# Patient Record
Sex: FEMALE | Race: WHITE | Employment: UNEMPLOYED | URBAN - METROPOLITAN AREA
[De-identification: names, ages, dates, MRNs, and addresses within clinical notes are randomized per-mention and may not be internally consistent; named-entity substitution may affect disease eponyms.]

---

## 2017-02-14 ENCOUNTER — HOSPITAL ENCOUNTER (OUTPATIENT)
Dept: SURGERY | Age: 47
Discharge: HOME | End: 2017-02-14
Attending: INTERNAL MEDICINE | Admitting: INTERNAL MEDICINE

## 2017-02-14 VITALS
HEIGHT: 65 IN | SYSTOLIC BLOOD PRESSURE: 133 MMHG | HEART RATE: 78 BPM | WEIGHT: 220 LBS | TEMPERATURE: 97.8 F | OXYGEN SATURATION: 98 % | DIASTOLIC BLOOD PRESSURE: 76 MMHG | RESPIRATION RATE: 20 BRPM | BODY MASS INDEX: 36.65 KG/M2

## 2017-02-14 RX ORDER — AMITRIPTYLINE HYDROCHLORIDE 100 MG/1
100 TABLET, FILM COATED ORAL NIGHTLY PRN
COMMUNITY
End: 2022-03-25 | Stop reason: ALTCHOICE

## 2017-02-14 RX ORDER — PROMETHAZINE HYDROCHLORIDE 25 MG/ML
12.5 INJECTION, SOLUTION INTRAMUSCULAR; INTRAVENOUS ONCE
Status: COMPLETED | OUTPATIENT
Start: 2017-02-14 | End: 2017-02-14

## 2017-02-14 RX ORDER — CYCLOBENZAPRINE HCL 5 MG
5 TABLET ORAL 3 TIMES DAILY PRN
COMMUNITY
End: 2021-07-10

## 2017-02-14 RX ORDER — DIPHENHYDRAMINE HYDROCHLORIDE 50 MG/ML
25 INJECTION INTRAMUSCULAR; INTRAVENOUS ONCE
Status: COMPLETED | OUTPATIENT
Start: 2017-02-14 | End: 2017-02-14

## 2017-02-14 RX ORDER — DIPHENHYDRAMINE HCL 25 MG
TABLET ORAL
Status: DISPENSED
Start: 2017-02-14 | End: 2017-02-14

## 2017-02-14 RX ORDER — OXYCODONE HYDROCHLORIDE AND ACETAMINOPHEN 5; 325 MG/1; MG/1
1 TABLET ORAL EVERY 4 HOURS PRN
COMMUNITY
End: 2017-02-16 | Stop reason: ALTCHOICE

## 2017-02-14 RX ADMIN — DIPHENHYDRAMINE HYDROCHLORIDE 25 MG: 50 INJECTION INTRAMUSCULAR; INTRAVENOUS at 11:15

## 2017-02-14 RX ADMIN — PROMETHAZINE HYDROCHLORIDE 12.5 MG: 25 INJECTION, SOLUTION INTRAMUSCULAR; INTRAVENOUS at 11:15

## 2017-02-16 PROBLEM — A41.9 SEPSIS (HCC): Status: ACTIVE | Noted: 2017-02-16

## 2017-02-16 PROBLEM — K44.9 HIATAL HERNIA WITH GERD AND ESOPHAGITIS: Chronic | Status: ACTIVE | Noted: 2017-02-16

## 2017-02-16 PROBLEM — K21.00 HIATAL HERNIA WITH GERD AND ESOPHAGITIS: Chronic | Status: ACTIVE | Noted: 2017-02-16

## 2017-02-16 PROBLEM — N20.0 RECURRENT NEPHROLITHIASIS: Status: ACTIVE | Noted: 2017-02-16

## 2017-12-05 ENCOUNTER — HOSPITAL ENCOUNTER (OUTPATIENT)
Dept: ENDOSCOPY | Age: 47
Discharge: HOME | End: 2017-12-05
Attending: INTERNAL MEDICINE | Admitting: INTERNAL MEDICINE

## 2017-12-05 VITALS
BODY MASS INDEX: 38.32 KG/M2 | WEIGHT: 230 LBS | HEART RATE: 106 BPM | OXYGEN SATURATION: 100 % | DIASTOLIC BLOOD PRESSURE: 85 MMHG | HEIGHT: 65 IN | TEMPERATURE: 98.1 F | SYSTOLIC BLOOD PRESSURE: 123 MMHG | RESPIRATION RATE: 20 BRPM

## 2017-12-05 RX ORDER — ESTRADIOL 0.07 MG/D
1 FILM, EXTENDED RELEASE TRANSDERMAL
COMMUNITY
Start: 2017-08-04 | End: 2020-08-29

## 2017-12-05 RX ORDER — DIPHENHYDRAMINE HYDROCHLORIDE 50 MG/ML
INJECTION INTRAMUSCULAR; INTRAVENOUS
Status: DISCONTINUED
Start: 2017-12-05 | End: 2017-12-06 | Stop reason: HOSPADM

## 2017-12-05 RX ORDER — ONDANSETRON 2 MG/ML
INJECTION INTRAMUSCULAR; INTRAVENOUS
Status: DISCONTINUED
Start: 2017-12-05 | End: 2017-12-06 | Stop reason: HOSPADM

## 2017-12-05 RX ORDER — DIPHENHYDRAMINE HYDROCHLORIDE 50 MG/ML
INJECTION INTRAMUSCULAR; INTRAVENOUS
Status: DISPENSED
Start: 2017-12-05 | End: 2017-12-05

## 2017-12-05 ASSESSMENT — PAIN - FUNCTIONAL ASSESSMENT: PAIN_FUNCTIONAL_ASSESSMENT: 0-10

## 2017-12-05 NOTE — OP NOTE
[address], [address]                                 OPERATIVE REPORT    PATIENT NAME: James Waters                        :        1970  MED REC NO:   [id number]                          ROOM:  ACCOUNT NO:   [de-identified]                          ADMIT DATE: 2017  PROVIDER:     Wagner Langley MD    DATE OF PROCEDURE:  2017    INDICATIONS FOR PROCEDURE:  A 73-year-old woman who presented with  dysphagia. She is known to have gastroesophageal reflux disease with  Villela esophagus. PROCEDURE:  With the patient in the left lateral position and after 50 mg  of Demerol and 6 mg of Versed IV, the Olympus video endoscope was  introduced into the esophagus and advanced towards the GE junction. A  small hiatus hernia and Villela esophagus was seen from which biopsies were  obtained. Further biopsies were obtained from the esophagus to rule out  the eosinophilic esophagitis. Given the patient history of dysphagia,  trial of dilation was  performed using balloon size 18 mm under direct  endoscopic control. Stomach was carefully inspected _____. The duodenum  was normal.  Scope was then removed without complication. IMPRESSION:  1. Hiatus hernia. 2.  Villela esophagus. Thank you Dr. Raul Doherty.         Ligia Matos MD    D: 2017 11:23:56       T: 2017 14:25:44     [email or web address]  Job#: [id number]     Doc#: [id number]    CC:

## 2017-12-05 NOTE — PLAN OF CARE
as ordered  [x] Tolerating clear liquids  [x] D/C IV fluids   ACTIVITY [x] Assess level of function  [x] Specified by physician  [x]Activity as tolerated [x] Position on left side [x] Position on left side and reposition patient as physician ordered [x] Gradually elevate HOB to callejass position  [x] Position changes as patient tolerated  [x] Ambulate as pre-procedure   PATIENT / SO EDUCATION [x] Pre,Intra, Post-procedure  teaching appropriate to procedure  [x]Conscious Sedation Teaching  [x] Pain Management - instructed [x] Encourage questions  [x] Clarify any concerns [x]Assist and support patient  [x]Safety devices maintain to  prevent patient injury  [x] Observe standard precautions [x] Physician confers with the family/S.O. [x] Short visit from family in [place] area  [x] Review discharge instructions, take home medicine to family /S.O.; questions clarified  [x] Physician specific post-procedure orders  [x] S/S complications with proper F/U  [x] F/U office visits  [] Med info sheet/ copy of discharge  instruction given to pt./S.O.   OUTCOME PLANNING  DISCHARGE PLAN [x] Patient/S. O. will verbalize understanding of admission  procedure & expectations of outcome in realistic terms  [x] Patient verbalize the role of family/S. O. in plan of care  [x] Patient will have designated responsible person available for discharge.  [x] Patient will demonstrate an  understanding of the planned  procedure and its related procedures and conscious sedation [x] Patient will:  - receive services according to the standards of care  - receive standards for conscious sedation  -  remain free of injury [x] Patient will:   - have stable vital signs based on Salomon Score  -  be pain free or tolerable, have no signs of difficulty in breathing  - no abdominal distention, severe sore throat, chest pain, bleeding  -  return to pre-procedure level of conciousness   - tolerate fluid with no N/V  - ambulate as pre-procedure ADL  - verbalize understanding of the discharge instructions      NURSE SIGNATURE: Celeste Coma    ____________________________ Lane Carito    ________________________    Lane Carito    __________________________    _______________________________________                                                            [id number],,C9[birth year],M

## 2018-08-15 ENCOUNTER — HOSPITAL ENCOUNTER (EMERGENCY)
Age: 48
Discharge: HOME | End: 2018-08-15
Attending: EMERGENCY MEDICINE
Payer: COMMERCIAL

## 2018-08-15 ENCOUNTER — APPOINTMENT (OUTPATIENT)
Dept: GENERAL RADIOLOGY | Age: 48
End: 2018-08-15
Payer: COMMERCIAL

## 2018-08-15 VITALS
SYSTOLIC BLOOD PRESSURE: 115 MMHG | BODY MASS INDEX: 37.49 KG/M2 | OXYGEN SATURATION: 99 % | HEIGHT: 65 IN | HEART RATE: 88 BPM | RESPIRATION RATE: 16 BRPM | DIASTOLIC BLOOD PRESSURE: 85 MMHG | WEIGHT: 225 LBS | TEMPERATURE: 98.1 F

## 2018-08-15 DIAGNOSIS — R10.84 GENERALIZED ABDOMINAL PAIN: Primary | ICD-10-CM

## 2018-08-15 DIAGNOSIS — R19.7 DIARRHEA, UNSPECIFIED TYPE: ICD-10-CM

## 2018-08-15 LAB
ALBUMIN SERPL-MCNC: 4.3 G/DL (ref 3.4–5)
ALP BLD-CCNC: 76 U/L (ref 40–129)
ALT SERPL-CCNC: 51 U/L (ref 10–40)
AST SERPL-CCNC: 32 U/L (ref 15–37)
BASOPHILS ABSOLUTE: 0 K/UL (ref 0–0.2)
BASOPHILS RELATIVE PERCENT: 0.2 %
BILIRUB SERPL-MCNC: 0.4 MG/DL (ref 0–1)
BILIRUBIN DIRECT: <0.2 MG/DL (ref 0–0.3)
BILIRUBIN URINE: NEGATIVE MG/DL
BILIRUBIN, INDIRECT: ABNORMAL MG/DL (ref 0–1)
BLOOD, URINE: NEGATIVE
CALCIUM IONIZED: 1.19 MMOL/L (ref 1.12–1.32)
CLARITY: NORMAL
CO2: 26 MMOL/L (ref 21–32)
COLOR: NORMAL
EOSINOPHILS ABSOLUTE: 0.2 K/UL (ref 0–0.6)
EOSINOPHILS RELATIVE PERCENT: 2.4 %
GFR AFRICAN AMERICAN: >60
GFR NON-AFRICAN AMERICAN: >60
GLUCOSE BLD-MCNC: 111 MG/DL (ref 70–99)
GLUCOSE URINE: NEGATIVE MG/DL
HCT VFR BLD CALC: 40.4 % (ref 36–48)
HEMOGLOBIN: 13.5 G/DL (ref 12–16)
KETONES, URINE: NEGATIVE MG/DL
LEUKOCYTE ESTERASE, URINE: NEGATIVE
LIPASE: 42 U/L (ref 13–60)
LYMPHOCYTES ABSOLUTE: 1.1 K/UL (ref 1–5.1)
LYMPHOCYTES RELATIVE PERCENT: 13.6 %
MCH RBC QN AUTO: 27.7 PG (ref 26–34)
MCHC RBC AUTO-ENTMCNC: 33.3 G/DL (ref 31–36)
MCV RBC AUTO: 83.1 FL (ref 80–100)
MICROSCOPIC EXAMINATION: NORMAL
MONOCYTES ABSOLUTE: 0.5 K/UL (ref 0–1.3)
MONOCYTES RELATIVE PERCENT: 5.9 %
NEUTROPHILS ABSOLUTE: 6.5 K/UL (ref 1.7–7.7)
NEUTROPHILS RELATIVE PERCENT: 77.9 %
NITRITE, URINE: NEGATIVE
PDW BLD-RTO: 14.7 % (ref 12.4–15.4)
PERFORMED ON: ABNORMAL
PERFORMED ON: NORMAL
PH UA: 6
PLATELET # BLD: 320 K/UL (ref 135–450)
PMV BLD AUTO: 7.8 FL (ref 5–10.5)
POC ANION GAP: 10 (ref 10–20)
POC BUN: 12 MG/DL (ref 7–18)
POC CHLORIDE: 107 MMOL/L (ref 99–110)
POC CREATININE: 0.9 MG/DL (ref 0.6–1.1)
POC POTASSIUM: 4.6 MMOL/L (ref 3.5–5.1)
POC SAMPLE TYPE: ABNORMAL
POC SAMPLE TYPE: NORMAL
POC SODIUM: 143 MMOL/L (ref 136–145)
POC TROPONIN I: 0 NG/ML (ref 0–0.1)
PROTEIN UA: NEGATIVE MG/DL
RBC # BLD: 4.87 M/UL (ref 4–5.2)
SPECIFIC GRAVITY UA: >=1.03
TOTAL PROTEIN: 7.1 G/DL (ref 6.4–8.2)
UROBILINOGEN, URINE: 0.2 E.U./DL
WBC # BLD: 8.3 K/UL (ref 4–11)

## 2018-08-15 PROCEDURE — 96366 THER/PROPH/DIAG IV INF ADDON: CPT

## 2018-08-15 PROCEDURE — 81003 URINALYSIS AUTO W/O SCOPE: CPT

## 2018-08-15 PROCEDURE — 2580000003 HC RX 258: Performed by: EMERGENCY MEDICINE

## 2018-08-15 PROCEDURE — 83690 ASSAY OF LIPASE: CPT

## 2018-08-15 PROCEDURE — 85025 COMPLETE CBC W/AUTO DIFF WBC: CPT

## 2018-08-15 PROCEDURE — 96365 THER/PROPH/DIAG IV INF INIT: CPT

## 2018-08-15 PROCEDURE — 80076 HEPATIC FUNCTION PANEL: CPT

## 2018-08-15 PROCEDURE — 6360000002 HC RX W HCPCS: Performed by: EMERGENCY MEDICINE

## 2018-08-15 PROCEDURE — 71046 X-RAY EXAM CHEST 2 VIEWS: CPT

## 2018-08-15 PROCEDURE — 80047 BASIC METABLC PNL IONIZED CA: CPT

## 2018-08-15 PROCEDURE — 96375 TX/PRO/DX INJ NEW DRUG ADDON: CPT

## 2018-08-15 PROCEDURE — 84484 ASSAY OF TROPONIN QUANT: CPT

## 2018-08-15 PROCEDURE — 96372 THER/PROPH/DIAG INJ SC/IM: CPT

## 2018-08-15 PROCEDURE — 99284 EMERGENCY DEPT VISIT MOD MDM: CPT

## 2018-08-15 PROCEDURE — 6360000002 HC RX W HCPCS: Performed by: PHYSICIAN ASSISTANT

## 2018-08-15 PROCEDURE — 6370000000 HC RX 637 (ALT 250 FOR IP): Performed by: PHYSICIAN ASSISTANT

## 2018-08-15 PROCEDURE — 93005 ELECTROCARDIOGRAM TRACING: CPT | Performed by: PHYSICIAN ASSISTANT

## 2018-08-15 RX ORDER — KETOROLAC TROMETHAMINE 30 MG/ML
15 INJECTION, SOLUTION INTRAMUSCULAR; INTRAVENOUS ONCE
Status: DISCONTINUED | OUTPATIENT
Start: 2018-08-15 | End: 2018-08-15

## 2018-08-15 RX ORDER — SODIUM CHLORIDE, SODIUM LACTATE, POTASSIUM CHLORIDE, AND CALCIUM CHLORIDE .6; .31; .03; .02 G/100ML; G/100ML; G/100ML; G/100ML
1000 INJECTION, SOLUTION INTRAVENOUS ONCE
Status: COMPLETED | OUTPATIENT
Start: 2018-08-15 | End: 2018-08-15

## 2018-08-15 RX ORDER — PROMETHAZINE HYDROCHLORIDE 25 MG/1
25 TABLET ORAL EVERY 6 HOURS PRN
Qty: 20 TABLET | Refills: 0 | Status: SHIPPED | OUTPATIENT
Start: 2018-08-15 | End: 2018-08-15

## 2018-08-15 RX ORDER — PROMETHAZINE HYDROCHLORIDE 25 MG/ML
12.5 INJECTION, SOLUTION INTRAMUSCULAR; INTRAVENOUS ONCE
Status: COMPLETED | OUTPATIENT
Start: 2018-08-15 | End: 2018-08-15

## 2018-08-15 RX ORDER — ONDANSETRON 2 MG/ML
4 INJECTION INTRAMUSCULAR; INTRAVENOUS ONCE
Status: COMPLETED | OUTPATIENT
Start: 2018-08-15 | End: 2018-08-15

## 2018-08-15 RX ORDER — KETOROLAC TROMETHAMINE 30 MG/ML
30 INJECTION, SOLUTION INTRAMUSCULAR; INTRAVENOUS ONCE
Status: COMPLETED | OUTPATIENT
Start: 2018-08-15 | End: 2018-08-15

## 2018-08-15 RX ORDER — HYDROCODONE BITARTRATE AND ACETAMINOPHEN 5; 325 MG/1; MG/1
1 TABLET ORAL EVERY 6 HOURS PRN
Qty: 6 TABLET | Refills: 0 | Status: SHIPPED | OUTPATIENT
Start: 2018-08-15 | End: 2018-08-15

## 2018-08-15 RX ORDER — HYDROCODONE BITARTRATE AND ACETAMINOPHEN 5; 325 MG/1; MG/1
1 TABLET ORAL EVERY 6 HOURS PRN
Qty: 6 TABLET | Refills: 0 | Status: SHIPPED | OUTPATIENT
Start: 2018-08-15 | End: 2018-08-18

## 2018-08-15 RX ORDER — PROMETHAZINE HYDROCHLORIDE 25 MG/1
25 TABLET ORAL EVERY 6 HOURS PRN
Qty: 20 TABLET | Refills: 0 | Status: SHIPPED | OUTPATIENT
Start: 2018-08-15 | End: 2018-08-22

## 2018-08-15 RX ADMIN — LIDOCAINE HYDROCHLORIDE: 20 SOLUTION ORAL; TOPICAL at 12:18

## 2018-08-15 RX ADMIN — PROMETHAZINE HYDROCHLORIDE 12.5 MG: 25 INJECTION INTRAMUSCULAR; INTRAVENOUS at 15:28

## 2018-08-15 RX ADMIN — KETOROLAC TROMETHAMINE 30 MG: 30 INJECTION, SOLUTION INTRAMUSCULAR at 12:55

## 2018-08-15 RX ADMIN — SODIUM CHLORIDE, POTASSIUM CHLORIDE, SODIUM LACTATE AND CALCIUM CHLORIDE 1000 ML: 600; 310; 30; 20 INJECTION, SOLUTION INTRAVENOUS at 12:23

## 2018-08-15 RX ADMIN — ONDANSETRON HYDROCHLORIDE 4 MG: 2 INJECTION, SOLUTION INTRAMUSCULAR; INTRAVENOUS at 12:17

## 2018-08-15 ASSESSMENT — PAIN DESCRIPTION - DESCRIPTORS: DESCRIPTORS: CRUSHING;CRAMPING

## 2018-08-15 ASSESSMENT — PAIN DESCRIPTION - ONSET: ONSET: GRADUAL

## 2018-08-15 ASSESSMENT — ENCOUNTER SYMPTOMS
ABDOMINAL PAIN: 1
VOMITING: 0
COLOR CHANGE: 0
WHEEZING: 0
BLOOD IN STOOL: 0
SHORTNESS OF BREATH: 0
COUGH: 0
DIARRHEA: 1
CHEST TIGHTNESS: 0
ABDOMINAL DISTENTION: 1
NAUSEA: 1

## 2018-08-15 ASSESSMENT — PAIN SCALES - GENERAL
PAINLEVEL_OUTOF10: 6
PAINLEVEL_OUTOF10: 8

## 2018-08-15 ASSESSMENT — PAIN DESCRIPTION - PAIN TYPE: TYPE: ACUTE PAIN

## 2018-08-15 ASSESSMENT — PAIN DESCRIPTION - LOCATION: LOCATION: ABDOMEN;BACK

## 2018-08-15 ASSESSMENT — PAIN DESCRIPTION - PROGRESSION: CLINICAL_PROGRESSION: GRADUALLY WORSENING

## 2018-08-15 NOTE — ED PROVIDER NOTES
[address] [facility] 72 ENCOUNTER          PHYSICIAN ASSISTANT NOTE       Date of evaluation: 8/15/2018    Chief Complaint     Abdominal Pain; Diarrhea; and Nausea    History of Present Illness     Siobhan Mccormick is a 53 y.o. female who presents With chief complaint of abdominal pain, diarrhea, nausea. Patient has a past medical history of Villela's esophagus, fibromyalgia, and nephrolithiasis. Patient reports that she noticed she had an increased belching and bloating overnight. She states at approximately 4 AM, she woke up and had an episode of diarrhea. Upon awakening in the morning, she had several more episodes of diarrhea. She describes these episodes as nonbloody. Continues to have abdominal bloating and episodes of belching. Reports nausea without vomiting. Describes the pain as being in her back with radiation into her lower abdomen bilaterally as well as into her left shoulder. Denies shortness of breath. Does report that this pain in her lower abdomen is similar to Prior pain she has experienced with kidney stones. Denies chest pain or shortness of breath. Does report some difficulty urinating, no dysuria or hematuria. Denies fever or chills. Patient does follow with Dr. Bell Ann for GI. She states that she called him, although he was the office and they instructed her to come to the ED for further evaluation. Review of Systems     Review of Systems   Constitutional: Negative for chills, diaphoresis, fatigue and fever. Respiratory: Negative for cough, chest tightness, shortness of breath and wheezing. Cardiovascular: Negative for chest pain, palpitations and leg swelling. Gastrointestinal: Positive for abdominal distention, abdominal pain, diarrhea and nausea. Negative for blood in stool and vomiting. Genitourinary: Positive for difficulty urinating and flank pain. Negative for dysuria, frequency, hematuria and urgency. Skin: Negative for color change, pallor and rash. Neurological: Negative for dizziness, weakness, light-headedness and headaches. Past Medical, Surgical, Family, and Social History     She has a past medical history of Villela's esophagus; Fibromyalgia; Hx of blood clots; Kidney stone; and PONV (postoperative nausea and vomiting). She has a past surgical history that includes other surgical history (10/3/14); Appendectomy; Abdomen surgery; Cholecystectomy; Breast surgery; Colonoscopy; Endoscopy, colon, diagnostic; Hysterectomy; and Tonsillectomy. Her family history is not on file. She reports that she has never smoked. She does not have any smokeless tobacco history on file. She reports that she does not drink alcohol or use drugs. Medications     Discharge Medication List as of 8/15/2018  3:34 PM      CONTINUE these medications which have NOT CHANGED    Details   estradiol (VIVELLE) 0.075 MG/24HR Apply 1 patch topicallyHistorical Med      metoprolol succinate (TOPROL XL) 25 MG extended release tablet Take 1 tablet by mouth daily, Disp-30 tablet, R-3      nystatin (MYCOSTATIN) 071147 UNIT/ML suspension Take 5 mLs by mouth 4 times daily, Oral, 4 TIMES DAILY Starting 2/21/2017, Until Discontinued, Disp-1 Bottle, R-1, Print      omeprazole (PRILOSEC) 40 MG delayed release capsule Take 40 mg by mouth daily      DULoxetine HCl (CYMBALTA PO) Take 90 mg by mouth daily      cyclobenzaprine (FLEXERIL) 5 MG tablet Take 5 mg by mouth 3 times daily as needed for Muscle spasms      amitriptyline (ELAVIL) 100 MG tablet Take 100 mg by mouth nightly             Allergies     She is allergic to rocephin [ceftriaxone]. Physical Exam     INITIAL VITALS: BP: (!) 145/82, Temp: 98.1 °F (36.7 °C), Pulse: 103, Resp: 17, SpO2: 99 %   Physical Exam   Constitutional: She is oriented to person, place, and time. She appears well-developed and well-nourished. No distress. HENT:   Head: Normocephalic and atraumatic. Eyes: Pupils are equal, round, and reactive to light. Conjunctivae and EOM are normal.   Neck: Normal range of motion. Cardiovascular: Normal rate, regular rhythm and normal heart sounds. Exam reveals no gallop and no friction rub. No murmur heard. Pulmonary/Chest: Effort normal and breath sounds normal. No respiratory distress. She has no wheezes. She has no rales. Abdominal: Soft. Bowel sounds are normal. She exhibits no mass. There is tenderness (bilateral lower abdomen). There is no rebound and no guarding. Left sided CVA tenderness   Musculoskeletal: Normal range of motion. Neurological: She is alert and oriented to person, place, and time. Skin: Skin is warm and dry. No rash noted. She is not diaphoretic. No erythema. No pallor. Psychiatric: She has a normal mood and affect. Her behavior is normal. Judgment and thought content normal.   Nursing note and vitals reviewed. Diagnostic Results     EKG   Interpreted in conjunction with emergency department physician No att. providers found  Rhythm: normal sinus   Rate: 98  Axis: normal  Ectopy: none  Conduction: normal  ST Segments: no acute change  T Waves: no acute change  Q Waves: none  Clinical Impression: no acute changes  Comparison:  2/16/17    RADIOLOGY:  XR CHEST STANDARD (2 VW)   Final Result   1. No acute disease.           LABS:   Results for orders placed or performed during the hospital encounter of 08/15/18   CBC auto differential   Result Value Ref Range    WBC 8.3 4.0 - 11.0 K/uL    RBC 4.87 4.00 - 5.20 M/uL    Hemoglobin 13.5 12.0 - 16.0 g/dL    Hematocrit 40.4 36.0 - 48.0 %    MCV 83.1 80.0 - 100.0 fL    MCH 27.7 26.0 - 34.0 pg    MCHC 33.3 31.0 - 36.0 g/dL    RDW 14.7 12.4 - 15.4 %    Platelets 116 086 - 218 K/uL    MPV 7.8 5.0 - 10.5 fL    Neutrophils % 77.9 %    Lymphocytes % 13.6 %    Monocytes % 5.9 %    Eosinophils % 2.4 %    Basophils % 0.2 %    Neutrophils # 6.5 1.7 - 7.7 K/uL    Lymphocytes # 1.1 1.0 - 5.1 K/uL    Monocytes # 0.5 0.0 - 1.3 K/uL    Eosinophils # 0.2 0.0 - (EDWARD) MENDY (Low) on 8/15/2018 3:14:51 PM       ED BEDSIDE ULTRASOUND:      RECENT VITALS:  BP: 115/85, Temp: 98.1 °F (36.7 °C), Pulse: 88, Resp: 16, SpO2: 99 %     Procedures         ED Course     Nursing Notes, Past Medical Hx, Past Surgical Hx, Social Hx, Allergies, and Family Hx were reviewed. The patient was given the following medications:  Orders Placed This Encounter   Medications    aluminum & magnesium hydroxide-simethicone (MAALOX) 30 mL, lidocaine viscous (XYLOCAINE) 5 mL (GI COCKTAIL)    ondansetron (ZOFRAN) injection 4 mg    lactated ringers bolus    ketorolac (TORADOL) injection 30 mg    promethazine (PHENERGAN) injection 12.5 mg    DISCONTD: ketorolac (TORADOL) injection 15 mg    DISCONTD: promethazine (PHENERGAN) 25 MG tablet     Sig: Take 1 tablet by mouth every 6 hours as needed for Nausea WARNING:  May cause drowsiness. May impair ability to operate vehicles or machinery. Do not use in combination with alcohol. Dispense:  20 tablet     Refill:  0    DISCONTD: HYDROcodone-acetaminophen (NORCO) 5-325 MG per tablet     Sig: Take 1 tablet by mouth every 6 hours as needed for Pain for up to 3 days. Intended supply: 3 days. Take lowest dose possible to manage pain. Dispense:  6 tablet     Refill:  0    HYDROcodone-acetaminophen (NORCO) 5-325 MG per tablet     Sig: Take 1 tablet by mouth every 6 hours as needed for Pain for up to 3 days. Intended supply: 3 days. Take lowest dose possible to manage pain. Dispense:  6 tablet     Refill:  0    promethazine (PHENERGAN) 25 MG tablet     Sig: Take 1 tablet by mouth every 6 hours as needed for Nausea WARNING:  May cause drowsiness. May impair ability to operate vehicles or machinery. Do not use in combination with alcohol.      Dispense:  20 tablet     Refill:  0       CONSULTS:  Rebecca Parks / ASSESSMENT / Jose Dos Santos is a 53 y.o. female who presented to the ED with generalized abdominal Disposition     PATIENT REFERRED TO:  Eula Ford MD  [address]  [address]   [phone]    Schedule an appointment as soon as possible for a visit       Sravani Levin MD  [address] [place] [address]  [phone]    Schedule an appointment as soon as possible for a visit on 8/16/2018        DISCHARGE MEDICATIONS:  Discharge Medication List as of 8/15/2018  3:34 PM          DISPOSITION Decision To Discharge 08/15/2018 03:44:12 PM       Jessy Turnerma  08/15/18 1753

## 2018-08-15 NOTE — ED NOTES
Bed: A02-02  Expected date:   Expected time:   Means of arrival:   Comments:  Alessandro Grewal RN  08/15/18 5558

## 2018-08-15 NOTE — ED PROVIDER NOTES
ED Attending Attestation Note     Date of evaluation: 8/15/2018    This patient was seen by the advance practice provider. I have seen and examined the patient, agree with the workup, evaluation, management and diagnosis. The care plan has been discussed. I have reviewed the ECG and concur with the TERRY's interpretation. My assessment reveals A 71-year-old female who presents with a one-day history of loose stools, nonbloody. Also c/o Lower abdominal pain with radiates to her left shoulder and left-sided flank pain. Patient has a history of kidney stones and a h/o Esophagitis being followed by GI. On exam patient was tender to palpation in the left side of her abdomen, she did have left CVA tenderness to percussion. Denied any chest pain or shortness of breath. EKG negative for any ischemia or infarction. Labs were all reassuring, no leukocytosis or left shift. Urine with slightly concentrated but showed no ketones or protein. Negative for blood. Negative for leukocyte esterase and nitrate suggest urinary tract infection. Paged patient's GI physician. Patient may have a kidney stone given her left flank pain. Patient has not required any interventions in the past for kidney stone. She has no hematuria but renal colic is possible. Patient's creatinine is normal, no evidence of postobstructive uropathy. At this time there is no indication for CT abdomen and pelvis to evaluate for renal colic as care would be supportive/symptomatic care. Urine does not show any evidence of a urinary tract infection, no clinical evidence of pyelonephritis    Spoke to patient's GI physician, he can see her in the office tomorrow morning. Patient felt better after ED interventions.  Stable for discharge    EKG at 1140  EKG Interpretation    Interpreted by me    Rhythm: normal sinus   Rate: normal, 98  Axis: normal  Ectopy: none  Conduction: normal  ST Segments: no acute change  T Waves: no acute change  Q Waves: none    Clinical Impression: no acute ischemia or infarction       Patito Juarez MD  08/15/18 1382

## 2018-08-17 ENCOUNTER — HOSPITAL ENCOUNTER (OUTPATIENT)
Age: 48
Setting detail: OUTPATIENT SURGERY
Discharge: HOME | End: 2018-08-17
Attending: INTERNAL MEDICINE | Admitting: INTERNAL MEDICINE
Payer: COMMERCIAL

## 2018-08-17 VITALS
DIASTOLIC BLOOD PRESSURE: 63 MMHG | OXYGEN SATURATION: 99 % | WEIGHT: 230 LBS | TEMPERATURE: 97.3 F | BODY MASS INDEX: 38.32 KG/M2 | RESPIRATION RATE: 16 BRPM | HEIGHT: 65 IN | HEART RATE: 81 BPM | SYSTOLIC BLOOD PRESSURE: 120 MMHG

## 2018-08-17 PROCEDURE — 88305 TISSUE EXAM BY PATHOLOGIST: CPT

## 2018-08-17 PROCEDURE — 7100000010 HC PHASE II RECOVERY - FIRST 15 MIN: Performed by: INTERNAL MEDICINE

## 2018-08-17 PROCEDURE — 99152 MOD SED SAME PHYS/QHP 5/>YRS: CPT | Performed by: INTERNAL MEDICINE

## 2018-08-17 PROCEDURE — 3609012400 HC EGD TRANSORAL BIOPSY SINGLE/MULTIPLE: Performed by: INTERNAL MEDICINE

## 2018-08-17 PROCEDURE — 6370000000 HC RX 637 (ALT 250 FOR IP): Performed by: INTERNAL MEDICINE

## 2018-08-17 PROCEDURE — 2720000010 HC SURG SUPPLY STERILE: Performed by: INTERNAL MEDICINE

## 2018-08-17 PROCEDURE — 7100000011 HC PHASE II RECOVERY - ADDTL 15 MIN: Performed by: INTERNAL MEDICINE

## 2018-08-17 PROCEDURE — 6360000002 HC RX W HCPCS

## 2018-08-17 PROCEDURE — 6360000002 HC RX W HCPCS: Performed by: INTERNAL MEDICINE

## 2018-08-17 RX ORDER — MIDAZOLAM HYDROCHLORIDE 1 MG/ML
INJECTION INTRAMUSCULAR; INTRAVENOUS PRN
Status: DISCONTINUED | OUTPATIENT
Start: 2018-08-17 | End: 2018-08-17 | Stop reason: HOSPADM

## 2018-08-17 RX ORDER — MEPERIDINE HYDROCHLORIDE 50 MG/ML
INJECTION INTRAMUSCULAR; INTRAVENOUS; SUBCUTANEOUS PRN
Status: DISCONTINUED | OUTPATIENT
Start: 2018-08-17 | End: 2018-08-17 | Stop reason: HOSPADM

## 2018-08-17 RX ORDER — DIPHENHYDRAMINE HYDROCHLORIDE 50 MG/ML
25 INJECTION INTRAMUSCULAR; INTRAVENOUS ONCE
Status: COMPLETED | OUTPATIENT
Start: 2018-08-17 | End: 2018-08-17

## 2018-08-17 RX ORDER — DIPHENHYDRAMINE HYDROCHLORIDE 50 MG/ML
INJECTION INTRAMUSCULAR; INTRAVENOUS
Status: COMPLETED
Start: 2018-08-17 | End: 2018-08-17

## 2018-08-17 RX ADMIN — DIPHENHYDRAMINE HYDROCHLORIDE 25 MG: 50 INJECTION, SOLUTION INTRAMUSCULAR; INTRAVENOUS at 10:05

## 2018-08-17 RX ADMIN — DIPHENHYDRAMINE HYDROCHLORIDE 25 MG: 50 INJECTION INTRAMUSCULAR; INTRAVENOUS at 10:05

## 2018-08-17 ASSESSMENT — ENCOUNTER SYMPTOMS: DIARRHEA: 1

## 2018-08-20 LAB
EKG ATRIAL RATE: 98 BPM
EKG DIAGNOSIS: NORMAL
EKG P AXIS: 22 DEGREES
EKG P-R INTERVAL: 134 MS
EKG Q-T INTERVAL: 362 MS
EKG QRS DURATION: 80 MS
EKG QTC CALCULATION (BAZETT): 462 MS
EKG R AXIS: -8 DEGREES
EKG T AXIS: 41 DEGREES
EKG VENTRICULAR RATE: 98 BPM

## 2018-10-12 ENCOUNTER — HOSPITAL ENCOUNTER (EMERGENCY)
Age: 48
Discharge: HOME | End: 2018-10-12
Attending: EMERGENCY MEDICINE
Payer: COMMERCIAL

## 2018-10-12 VITALS
TEMPERATURE: 98.1 F | WEIGHT: 230 LBS | HEART RATE: 88 BPM | SYSTOLIC BLOOD PRESSURE: 113 MMHG | HEIGHT: 65 IN | RESPIRATION RATE: 18 BRPM | BODY MASS INDEX: 38.32 KG/M2 | DIASTOLIC BLOOD PRESSURE: 84 MMHG | OXYGEN SATURATION: 100 %

## 2018-10-12 DIAGNOSIS — N20.0 NEPHROLITHIASIS: Primary | ICD-10-CM

## 2018-10-12 LAB
ANION GAP SERPL CALCULATED.3IONS-SCNC: 15 MMOL/L (ref 3–16)
BASE EXCESS VENOUS: 2 (ref -3–3)
BASOPHILS ABSOLUTE: 0.1 K/UL (ref 0–0.2)
BASOPHILS RELATIVE PERCENT: 1.2 %
BILIRUBIN URINE: NEGATIVE
BLOOD, URINE: NEGATIVE
BUN BLDV-MCNC: 10 MG/DL (ref 7–20)
CALCIUM SERPL-MCNC: 10.3 MG/DL (ref 8.3–10.6)
CHLORIDE BLD-SCNC: 102 MMOL/L (ref 99–110)
CLARITY: CLEAR
CO2: 24 MMOL/L (ref 21–32)
COLOR: YELLOW
CREAT SERPL-MCNC: 0.8 MG/DL (ref 0.6–1.1)
EOSINOPHILS ABSOLUTE: 0.3 K/UL (ref 0–0.6)
EOSINOPHILS RELATIVE PERCENT: 4.3 %
GFR AFRICAN AMERICAN: >60
GFR NON-AFRICAN AMERICAN: >60
GLUCOSE BLD-MCNC: 135 MG/DL (ref 70–99)
GLUCOSE URINE: NEGATIVE MG/DL
HCO3 VENOUS: 26.2 MMOL/L (ref 23–29)
HCT VFR BLD CALC: 40.5 % (ref 36–48)
HEMOGLOBIN: 13.6 G/DL (ref 12–16)
KETONES, URINE: NEGATIVE MG/DL
LACTATE: 1.75 MMOL/L (ref 0.4–2)
LEUKOCYTE ESTERASE, URINE: NEGATIVE
LYMPHOCYTES ABSOLUTE: 2 K/UL (ref 1–5.1)
LYMPHOCYTES RELATIVE PERCENT: 28.9 %
MCH RBC QN AUTO: 27.8 PG (ref 26–34)
MCHC RBC AUTO-ENTMCNC: 33.6 G/DL (ref 31–36)
MCV RBC AUTO: 82.8 FL (ref 80–100)
MICROSCOPIC EXAMINATION: NORMAL
MONOCYTES ABSOLUTE: 0.5 K/UL (ref 0–1.3)
MONOCYTES RELATIVE PERCENT: 6.9 %
NEUTROPHILS ABSOLUTE: 4.1 K/UL (ref 1.7–7.7)
NEUTROPHILS RELATIVE PERCENT: 58.7 %
NITRITE, URINE: NEGATIVE
O2 SAT, VEN: 85 %
PCO2, VEN: 40.1 MM HG (ref 40–50)
PDW BLD-RTO: 14.7 % (ref 12.4–15.4)
PERFORMED ON: NORMAL
PH UA: 6.5
PH VENOUS: 7.42 (ref 7.35–7.45)
PLATELET # BLD: 328 K/UL (ref 135–450)
PMV BLD AUTO: 7.6 FL (ref 5–10.5)
PO2, VEN: 49 MM HG
POC SAMPLE TYPE: NORMAL
POTASSIUM REFLEX MAGNESIUM: 3.8 MMOL/L (ref 3.5–5.1)
PROTEIN UA: NEGATIVE MG/DL
RBC # BLD: 4.89 M/UL (ref 4–5.2)
SODIUM BLD-SCNC: 141 MMOL/L (ref 136–145)
SPECIFIC GRAVITY UA: 1.01
TCO2 CALC VENOUS: 27 MMOL/L
URINE TYPE: NORMAL
UROBILINOGEN, URINE: 0.2 E.U./DL
WBC # BLD: 7.1 K/UL (ref 4–11)

## 2018-10-12 PROCEDURE — 81003 URINALYSIS AUTO W/O SCOPE: CPT

## 2018-10-12 PROCEDURE — 96372 THER/PROPH/DIAG INJ SC/IM: CPT

## 2018-10-12 PROCEDURE — 82803 BLOOD GASES ANY COMBINATION: CPT

## 2018-10-12 PROCEDURE — 80048 BASIC METABOLIC PNL TOTAL CA: CPT

## 2018-10-12 PROCEDURE — 6360000002 HC RX W HCPCS: Performed by: STUDENT IN AN ORGANIZED HEALTH CARE EDUCATION/TRAINING PROGRAM

## 2018-10-12 PROCEDURE — 96361 HYDRATE IV INFUSION ADD-ON: CPT

## 2018-10-12 PROCEDURE — 99284 EMERGENCY DEPT VISIT MOD MDM: CPT

## 2018-10-12 PROCEDURE — 2580000003 HC RX 258: Performed by: STUDENT IN AN ORGANIZED HEALTH CARE EDUCATION/TRAINING PROGRAM

## 2018-10-12 PROCEDURE — 83605 ASSAY OF LACTIC ACID: CPT

## 2018-10-12 PROCEDURE — 85025 COMPLETE CBC W/AUTO DIFF WBC: CPT

## 2018-10-12 PROCEDURE — 96375 TX/PRO/DX INJ NEW DRUG ADDON: CPT

## 2018-10-12 PROCEDURE — 96374 THER/PROPH/DIAG INJ IV PUSH: CPT

## 2018-10-12 RX ORDER — PROMETHAZINE HYDROCHLORIDE 25 MG/ML
12.5 INJECTION, SOLUTION INTRAMUSCULAR; INTRAVENOUS ONCE
Status: COMPLETED | OUTPATIENT
Start: 2018-10-12 | End: 2018-10-12

## 2018-10-12 RX ORDER — KETOROLAC TROMETHAMINE 30 MG/ML
15 INJECTION, SOLUTION INTRAMUSCULAR; INTRAVENOUS ONCE
Status: COMPLETED | OUTPATIENT
Start: 2018-10-12 | End: 2018-10-12

## 2018-10-12 RX ORDER — OXYCODONE HYDROCHLORIDE AND ACETAMINOPHEN 5; 325 MG/1; MG/1
1 TABLET ORAL EVERY 6 HOURS PRN
Qty: 12 TABLET | Refills: 0 | Status: SHIPPED | OUTPATIENT
Start: 2018-10-12 | End: 2018-10-15

## 2018-10-12 RX ORDER — PROMETHAZINE HYDROCHLORIDE 25 MG/1
12.5 TABLET ORAL EVERY 6 HOURS PRN
Qty: 20 TABLET | Refills: 0 | Status: SHIPPED | OUTPATIENT
Start: 2018-10-12 | End: 2018-10-19

## 2018-10-12 RX ORDER — MORPHINE SULFATE 4 MG/ML
4 INJECTION, SOLUTION INTRAMUSCULAR; INTRAVENOUS ONCE
Status: COMPLETED | OUTPATIENT
Start: 2018-10-12 | End: 2018-10-12

## 2018-10-12 RX ORDER — SODIUM CHLORIDE, SODIUM LACTATE, POTASSIUM CHLORIDE, AND CALCIUM CHLORIDE .6; .31; .03; .02 G/100ML; G/100ML; G/100ML; G/100ML
1000 INJECTION, SOLUTION INTRAVENOUS ONCE
Status: COMPLETED | OUTPATIENT
Start: 2018-10-12 | End: 2018-10-12

## 2018-10-12 RX ORDER — FENTANYL CITRATE 50 UG/ML
25 INJECTION, SOLUTION INTRAMUSCULAR; INTRAVENOUS ONCE
Status: COMPLETED | OUTPATIENT
Start: 2018-10-12 | End: 2018-10-12

## 2018-10-12 RX ADMIN — FENTANYL CITRATE 25 MCG: 50 INJECTION, SOLUTION INTRAMUSCULAR; INTRAVENOUS at 12:32

## 2018-10-12 RX ADMIN — MORPHINE SULFATE 4 MG: 4 INJECTION INTRAVENOUS at 10:31

## 2018-10-12 RX ADMIN — SODIUM CHLORIDE, POTASSIUM CHLORIDE, SODIUM LACTATE AND CALCIUM CHLORIDE 1000 ML: 600; 310; 30; 20 INJECTION, SOLUTION INTRAVENOUS at 10:30

## 2018-10-12 RX ADMIN — PROMETHAZINE HYDROCHLORIDE 12.5 MG: 25 INJECTION INTRAMUSCULAR; INTRAVENOUS at 10:30

## 2018-10-12 RX ADMIN — KETOROLAC TROMETHAMINE 15 MG: 30 INJECTION, SOLUTION INTRAMUSCULAR at 10:31

## 2018-10-12 ASSESSMENT — PAIN DESCRIPTION - PROGRESSION: CLINICAL_PROGRESSION: GRADUALLY WORSENING

## 2018-10-12 ASSESSMENT — PAIN DESCRIPTION - PAIN TYPE: TYPE: ACUTE PAIN

## 2018-10-12 ASSESSMENT — PAIN DESCRIPTION - ONSET: ONSET: SUDDEN

## 2018-10-12 ASSESSMENT — PAIN DESCRIPTION - LOCATION: LOCATION: FLANK

## 2018-10-12 ASSESSMENT — PAIN DESCRIPTION - DESCRIPTORS: DESCRIPTORS: SHOOTING

## 2018-10-12 ASSESSMENT — PAIN SCALES - GENERAL
PAINLEVEL_OUTOF10: 9
PAINLEVEL_OUTOF10: 7

## 2018-10-12 ASSESSMENT — PAIN DESCRIPTION - FREQUENCY: FREQUENCY: CONTINUOUS

## 2018-10-12 ASSESSMENT — PAIN DESCRIPTION - ORIENTATION: ORIENTATION: RIGHT;LEFT

## 2019-04-16 ENCOUNTER — HOSPITAL ENCOUNTER (OUTPATIENT)
Age: 49
Setting detail: OUTPATIENT SURGERY
Discharge: HOME | End: 2019-04-16
Attending: INTERNAL MEDICINE | Admitting: INTERNAL MEDICINE
Payer: COMMERCIAL

## 2019-04-16 VITALS
TEMPERATURE: 97.4 F | WEIGHT: 240 LBS | BODY MASS INDEX: 39.99 KG/M2 | HEART RATE: 83 BPM | OXYGEN SATURATION: 100 % | SYSTOLIC BLOOD PRESSURE: 123 MMHG | HEIGHT: 65 IN | DIASTOLIC BLOOD PRESSURE: 68 MMHG | RESPIRATION RATE: 16 BRPM

## 2019-04-16 PROCEDURE — 6360000002 HC RX W HCPCS: Performed by: INTERNAL MEDICINE

## 2019-04-16 PROCEDURE — 88305 TISSUE EXAM BY PATHOLOGIST: CPT

## 2019-04-16 PROCEDURE — 7100000011 HC PHASE II RECOVERY - ADDTL 15 MIN: Performed by: INTERNAL MEDICINE

## 2019-04-16 PROCEDURE — 7100000010 HC PHASE II RECOVERY - FIRST 15 MIN: Performed by: INTERNAL MEDICINE

## 2019-04-16 PROCEDURE — 6370000000 HC RX 637 (ALT 250 FOR IP): Performed by: INTERNAL MEDICINE

## 2019-04-16 PROCEDURE — 6360000002 HC RX W HCPCS

## 2019-04-16 PROCEDURE — 3609012400 HC EGD TRANSORAL BIOPSY SINGLE/MULTIPLE: Performed by: INTERNAL MEDICINE

## 2019-04-16 PROCEDURE — 2709999900 HC NON-CHARGEABLE SUPPLY: Performed by: INTERNAL MEDICINE

## 2019-04-16 PROCEDURE — 99152 MOD SED SAME PHYS/QHP 5/>YRS: CPT | Performed by: INTERNAL MEDICINE

## 2019-04-16 RX ORDER — PANTOPRAZOLE SODIUM 40 MG/1
TABLET, DELAYED RELEASE ORAL 2 TIMES DAILY
COMMUNITY
Start: 2019-04-11

## 2019-04-16 RX ORDER — MEPERIDINE HYDROCHLORIDE 25 MG/ML
INJECTION INTRAMUSCULAR; INTRAVENOUS; SUBCUTANEOUS PRN
Status: DISCONTINUED | OUTPATIENT
Start: 2019-04-16 | End: 2019-04-16 | Stop reason: ALTCHOICE

## 2019-04-16 RX ORDER — MIDAZOLAM HYDROCHLORIDE 1 MG/ML
INJECTION INTRAMUSCULAR; INTRAVENOUS PRN
Status: DISCONTINUED | OUTPATIENT
Start: 2019-04-16 | End: 2019-04-16 | Stop reason: ALTCHOICE

## 2019-04-16 RX ORDER — DIPHENHYDRAMINE HYDROCHLORIDE 50 MG/ML
25 INJECTION INTRAMUSCULAR; INTRAVENOUS ONCE
Status: COMPLETED | OUTPATIENT
Start: 2019-04-16 | End: 2019-04-16

## 2019-04-16 RX ORDER — DIPHENHYDRAMINE HYDROCHLORIDE 50 MG/ML
INJECTION INTRAMUSCULAR; INTRAVENOUS
Status: COMPLETED
Start: 2019-04-16 | End: 2019-04-16

## 2019-04-16 RX ADMIN — DIPHENHYDRAMINE HYDROCHLORIDE 25 MG: 50 INJECTION, SOLUTION INTRAMUSCULAR; INTRAVENOUS at 12:24

## 2019-04-16 RX ADMIN — DIPHENHYDRAMINE HYDROCHLORIDE 25 MG: 50 INJECTION INTRAMUSCULAR; INTRAVENOUS at 12:24

## 2019-04-16 ASSESSMENT — PAIN SCALES - GENERAL: PAINLEVEL_OUTOF10: 0

## 2019-04-16 ASSESSMENT — PAIN - FUNCTIONAL ASSESSMENT
PAIN_FUNCTIONAL_ASSESSMENT: 0-10
PAIN_FUNCTIONAL_ASSESSMENT: FACES
PAIN_FUNCTIONAL_ASSESSMENT: FACES
PAIN_FUNCTIONAL_ASSESSMENT: 0-10

## 2019-04-16 ASSESSMENT — ENCOUNTER SYMPTOMS: NAUSEA: 1

## 2019-04-16 NOTE — H&P
History and Physical / Pre-Sedation Assessment    Patient:  Shi Stinson   :   1970     Intended Procedure:  EGd    HPI: nausea and vomiting. Abdominal pain    Past Medical History:   has a past medical history of Villela's esophagus, Fibromyalgia, Hx of blood clots, Kidney stone, and PONV (postoperative nausea and vomiting). Past Surgical History:   has a past surgical history that includes other surgical history (10/3/14); Appendectomy; Abdomen surgery; Cholecystectomy; Breast surgery; Colonoscopy; Endoscopy, colon, diagnostic; Hysterectomy; Tonsillectomy; and Upper gastrointestinal endoscopy (N/A, 2018). Medications:  Prior to Admission medications    Medication Sig Start Date End Date Taking? Authorizing Provider   pantoprazole (PROTONIX) 40 MG tablet  19  Yes Historical Provider, MD   estradiol (VIVELLE) 0.075 MG/24HR Apply 1 patch topically 17  Yes Historical Provider, MD   DULoxetine HCl (CYMBALTA PO) Take 90 mg by mouth daily   Yes Historical Provider, MD   cyclobenzaprine (FLEXERIL) 5 MG tablet Take 5 mg by mouth 3 times daily as needed for Muscle spasms   Yes Historical Provider, MD   amitriptyline (ELAVIL) 100 MG tablet Take 100 mg by mouth nightly   Yes Historical Provider, MD       Family History:  family history is not on file. Social History:   reports that she has never smoked. She has never used smokeless tobacco. She reports that she does not drink alcohol or use drugs. Allergies:  Rocephin [ceftriaxone]    Nurses notes reviewed and agreed.   Medications reviewed    Physical Exam:  Vital Signs: BP (!) 136/90   Pulse 96   Temp 97.4 °F (36.3 °C) (Oral)   Resp (!) 0   Ht 5' 5\" (1.651 m)   Wt 240 lb (108.9 kg)   SpO2 97%   BMI 39.94 kg/m²    Pulmonary:Normal  Cardiac:Normal  Abdomen:Normal    Pre-Procedure Assessment / Plan:  ASA 2 - Patient with mild systemic disease with no functional limitations  Mallampati Airway Assessment:  Mallampati Class II - (soft

## 2019-04-16 NOTE — PROGRESS NOTES
Layton Stanford is a 51 y.o. female patient. No current facility-administered medications for this encounter. Allergies   Allergen Reactions    Rocephin [Ceftriaxone] Rash     Active Problems:    * No active hospital problems. *  Resolved Problems:    * No resolved hospital problems. *    Blood pressure 130/69, pulse 90, temperature 97.4 °F (36.3 °C), temperature source Oral, resp. rate 18, height 5' 5\" (1.651 m), weight 240 lb (108.9 kg), SpO2 98 %. Subjective:  Symptoms:  Stable. Diet:  NPO (mild). She reports  nausea. Activity level: Normal.    Pain:  She reports no pain. Objective:  General Appearance:  Comfortable (Denies abdominal pain but endorses some pain from kidney stone). Vital signs: (most recent): Blood pressure 130/69, pulse 90, temperature 97.4 °F (36.3 °C), temperature source Oral, resp. rate 18, height 5' 5\" (1.651 m), weight 240 lb (108.9 kg), SpO2 98 %. Vital signs are normal.    Output: Producing urine and producing stool. Abdomen: Abdomen is soft. There is no abdominal tenderness. Neurological: Patient is alert and oriented to person, place and time. Skin:  Warm and dry.       Assessment & Plan    Soo Medrano RN  4/16/2019

## 2019-05-28 ENCOUNTER — HOSPITAL ENCOUNTER (OUTPATIENT)
Age: 49
Setting detail: OUTPATIENT SURGERY
Discharge: HOME | End: 2019-05-28
Attending: INTERNAL MEDICINE | Admitting: INTERNAL MEDICINE
Payer: COMMERCIAL

## 2019-05-28 VITALS
TEMPERATURE: 97.9 F | HEART RATE: 81 BPM | RESPIRATION RATE: 17 BRPM | SYSTOLIC BLOOD PRESSURE: 138 MMHG | BODY MASS INDEX: 39.99 KG/M2 | DIASTOLIC BLOOD PRESSURE: 78 MMHG | WEIGHT: 240 LBS | HEIGHT: 65 IN | OXYGEN SATURATION: 93 %

## 2019-05-28 PROCEDURE — 7100000010 HC PHASE II RECOVERY - FIRST 15 MIN: Performed by: INTERNAL MEDICINE

## 2019-05-28 PROCEDURE — 7100000011 HC PHASE II RECOVERY - ADDTL 15 MIN: Performed by: INTERNAL MEDICINE

## 2019-05-28 PROCEDURE — 88305 TISSUE EXAM BY PATHOLOGIST: CPT

## 2019-05-28 PROCEDURE — 99152 MOD SED SAME PHYS/QHP 5/>YRS: CPT | Performed by: INTERNAL MEDICINE

## 2019-05-28 PROCEDURE — 2709999900 HC NON-CHARGEABLE SUPPLY: Performed by: INTERNAL MEDICINE

## 2019-05-28 PROCEDURE — 3609012400 HC EGD TRANSORAL BIOPSY SINGLE/MULTIPLE: Performed by: INTERNAL MEDICINE

## 2019-05-28 PROCEDURE — 6360000002 HC RX W HCPCS: Performed by: INTERNAL MEDICINE

## 2019-05-28 PROCEDURE — 6370000000 HC RX 637 (ALT 250 FOR IP): Performed by: INTERNAL MEDICINE

## 2019-05-28 RX ORDER — MEPERIDINE HYDROCHLORIDE 50 MG/ML
INJECTION INTRAMUSCULAR; INTRAVENOUS; SUBCUTANEOUS PRN
Status: DISCONTINUED | OUTPATIENT
Start: 2019-05-28 | End: 2019-05-28 | Stop reason: ALTCHOICE

## 2019-05-28 RX ORDER — DIPHENHYDRAMINE HYDROCHLORIDE 50 MG/ML
25 INJECTION INTRAMUSCULAR; INTRAVENOUS ONCE
Status: COMPLETED | OUTPATIENT
Start: 2019-05-28 | End: 2019-05-28

## 2019-05-28 RX ORDER — ATORVASTATIN CALCIUM 40 MG/1
40 TABLET, FILM COATED ORAL
COMMUNITY
Start: 2019-05-09 | End: 2020-08-29

## 2019-05-28 RX ORDER — MIDAZOLAM HYDROCHLORIDE 1 MG/ML
INJECTION INTRAMUSCULAR; INTRAVENOUS PRN
Status: DISCONTINUED | OUTPATIENT
Start: 2019-05-28 | End: 2019-05-28 | Stop reason: ALTCHOICE

## 2019-05-28 RX ADMIN — DIPHENHYDRAMINE HYDROCHLORIDE 25 MG: 50 INJECTION, SOLUTION INTRAMUSCULAR; INTRAVENOUS at 10:50

## 2019-05-28 ASSESSMENT — PAIN SCALES - WONG BAKER
WONGBAKER_NUMERICALRESPONSE: 0
WONGBAKER_NUMERICALRESPONSE: 0

## 2019-05-28 ASSESSMENT — PAIN SCALES - GENERAL
PAINLEVEL_OUTOF10: 0
PAINLEVEL_OUTOF10: 0

## 2019-05-28 ASSESSMENT — PAIN - FUNCTIONAL ASSESSMENT: PAIN_FUNCTIONAL_ASSESSMENT: 0-10

## 2019-05-28 ASSESSMENT — PAIN DESCRIPTION - DESCRIPTORS: DESCRIPTORS: ACHING

## 2019-05-28 NOTE — OP NOTE
[address]                                OPERATIVE REPORT    PATIENT NAME: Jerman Jason                      :        1970  MED REC NO:   [id number]                          ROOM:  ACCOUNT NO:   [de-identified]                           ADMIT DATE: 2019  PROVIDER:     Juan Antonio Douglas MD    DATE OF PROCEDURE:  2019    INDICATION FOR THE PROCEDURE:  A pleasant 52-year-old woman who is  having an endoscopy to ensure the healing of a large ulcer noted at the  GE junction. DESCRIPTION OF PROCEDURE:  With the patient in the left lateral position  and after 50 mg of Demerol and 5 mg of Versed IV, the Olympus video  endoscope was introduced into the esophagus and advanced towards the GE  junction. The previously noted ulcer had completely healed. Villela's  esophagus was seen and biopsies were obtained. There does not appear to  be any sign of neoplasm. Stomach was carefully inspected, was  unremarkable, and biopsies were obtained for Helicobacter pylori. The  duodenum was normal.  Scope was then removed without complications. IMPRESSION:  1. Hiatus hernia. 2.  Villela's esophagus. 3.  Satisfactory healing of ulcer at the GE junction. James Hardy MD    D: 2019 9:57:59       T: 2019 12:34:16     [email or web address]  Job#: [id number]     Doc#: [id number]    CC:   MD Meli Graham MD

## 2019-05-28 NOTE — H&P
History and Physical / Pre-Sedation Assessment    Patient:  Velma Rodriguez   :   1970     Intended Procedure:  egd    HPI: FU ulcer at GEJ to insure healing    Past Medical History:   has a past medical history of Villela's esophagus, Fibromyalgia, Hx of blood clots, Kidney stone, and PONV (postoperative nausea and vomiting). obesity    Past Surgical History:   has a past surgical history that includes other surgical history (10/3/14); Appendectomy; Abdomen surgery; Cholecystectomy; Breast surgery; Colonoscopy; Endoscopy, colon, diagnostic; Hysterectomy; Tonsillectomy; Upper gastrointestinal endoscopy (N/A, 2018); and Upper gastrointestinal endoscopy (N/A, 2019). Medications:  Prior to Admission medications    Medication Sig Start Date End Date Taking? Authorizing Provider   atorvastatin (LIPITOR) 40 MG tablet Take 40 mg by mouth 19  Yes Historical Provider, MD   pantoprazole (PROTONIX) 40 MG tablet 2 times daily  19  Yes Historical Provider, MD   DULoxetine HCl (CYMBALTA PO) Take 90 mg by mouth daily   Yes Historical Provider, MD   cyclobenzaprine (FLEXERIL) 5 MG tablet Take 5 mg by mouth 3 times daily as needed for Muscle spasms   Yes Historical Provider, MD   amitriptyline (ELAVIL) 100 MG tablet Take 100 mg by mouth nightly   Yes Historical Provider, MD   estradiol (VIVELLE) 0.075 MG/24HR Apply 1 patch topically 17   Historical Provider, MD       Family History:  family history is not on file. none    Social History:   reports that she has never smoked. She has never used smokeless tobacco. She reports that she does not drink alcohol or use drugs. Allergies:  Rocephin [ceftriaxone]    ROS:  twelve point system review was unremarkable except for above noted history. Nurses notes reviewed and agreed.   Medications reviewed    Physical Exam:  Vital Signs: BP (!) 147/88   Pulse 96   Temp 97.9 °F (36.6 °C) (Oral)   Resp 19   Ht 5' 5\" (1.651 m)   Wt 240 lb (108.9 kg)   SpO2 96%   BMI 39.94 kg/m²    Pulmonary:Normal  Cardiac:Normal  Abdomen:Normal  Ext: no edema  Neuro : UR    Pre-Procedure Assessment / Plan:  ASA 2 - Patient with mild systemic disease with no functional limitations  Mallampati Airway Assessment:  Mallampati Class II - (soft palate, fauces & uvula are visible)  Level of Sedation Plan: Moderate sedation  Post Procedure plan: Return to same level of care    I assessed the patient and find that the patient is in satisfactory condition to proceed with the planned procedure and sedation plan. I have explained the risk, benefits, and alternatives to the procedure. The patient understands and agrees to proceed.   Lola Bodily  9:48 AM 5/28/2019

## 2019-08-30 ENCOUNTER — ANESTHESIA EVENT (OUTPATIENT)
Dept: ENDOSCOPY | Age: 49
End: 2019-08-30
Payer: COMMERCIAL

## 2019-08-30 ENCOUNTER — ANESTHESIA (OUTPATIENT)
Dept: ENDOSCOPY | Age: 49
End: 2019-08-30
Payer: COMMERCIAL

## 2019-08-30 ENCOUNTER — HOSPITAL ENCOUNTER (OUTPATIENT)
Age: 49
Setting detail: OUTPATIENT SURGERY
Discharge: HOME | End: 2019-08-30
Attending: INTERNAL MEDICINE | Admitting: INTERNAL MEDICINE
Payer: COMMERCIAL

## 2019-08-30 VITALS
BODY MASS INDEX: 39.99 KG/M2 | WEIGHT: 240 LBS | RESPIRATION RATE: 20 BRPM | HEIGHT: 65 IN | DIASTOLIC BLOOD PRESSURE: 75 MMHG | TEMPERATURE: 98.1 F | OXYGEN SATURATION: 99 % | HEART RATE: 91 BPM | SYSTOLIC BLOOD PRESSURE: 118 MMHG

## 2019-08-30 VITALS
OXYGEN SATURATION: 96 % | DIASTOLIC BLOOD PRESSURE: 81 MMHG | RESPIRATION RATE: 23 BRPM | SYSTOLIC BLOOD PRESSURE: 137 MMHG

## 2019-08-30 PROCEDURE — 3609010400 HC COLONOSCOPY POLYPECTOMY HOT BIOPSY: Performed by: INTERNAL MEDICINE

## 2019-08-30 PROCEDURE — 88305 TISSUE EXAM BY PATHOLOGIST: CPT

## 2019-08-30 PROCEDURE — 3700000001 HC ADD 15 MINUTES (ANESTHESIA): Performed by: INTERNAL MEDICINE

## 2019-08-30 PROCEDURE — 7100000011 HC PHASE II RECOVERY - ADDTL 15 MIN: Performed by: INTERNAL MEDICINE

## 2019-08-30 PROCEDURE — 2580000003 HC RX 258: Performed by: NURSE ANESTHETIST, CERTIFIED REGISTERED

## 2019-08-30 PROCEDURE — 3700000000 HC ANESTHESIA ATTENDED CARE: Performed by: INTERNAL MEDICINE

## 2019-08-30 PROCEDURE — 3609010300 HC COLONOSCOPY W/BIOPSY SINGLE/MULTIPLE: Performed by: INTERNAL MEDICINE

## 2019-08-30 PROCEDURE — 2500000003 HC RX 250 WO HCPCS: Performed by: NURSE ANESTHETIST, CERTIFIED REGISTERED

## 2019-08-30 PROCEDURE — 7100000010 HC PHASE II RECOVERY - FIRST 15 MIN: Performed by: INTERNAL MEDICINE

## 2019-08-30 PROCEDURE — 6360000002 HC RX W HCPCS: Performed by: NURSE ANESTHETIST, CERTIFIED REGISTERED

## 2019-08-30 PROCEDURE — 2500000003 HC RX 250 WO HCPCS: Performed by: INTERNAL MEDICINE

## 2019-08-30 PROCEDURE — 2709999900 HC NON-CHARGEABLE SUPPLY: Performed by: INTERNAL MEDICINE

## 2019-08-30 RX ORDER — SODIUM CHLORIDE, SODIUM LACTATE, POTASSIUM CHLORIDE, CALCIUM CHLORIDE 600; 310; 30; 20 MG/100ML; MG/100ML; MG/100ML; MG/100ML
INJECTION, SOLUTION INTRAVENOUS CONTINUOUS PRN
Status: DISCONTINUED | OUTPATIENT
Start: 2019-08-30 | End: 2019-08-30 | Stop reason: SDUPTHER

## 2019-08-30 RX ORDER — PROMETHAZINE HYDROCHLORIDE 25 MG/ML
INJECTION, SOLUTION INTRAMUSCULAR; INTRAVENOUS PRN
Status: DISCONTINUED | OUTPATIENT
Start: 2019-08-30 | End: 2019-08-30 | Stop reason: SDUPTHER

## 2019-08-30 RX ORDER — PROMETHAZINE HYDROCHLORIDE 25 MG/ML
INJECTION, SOLUTION INTRAMUSCULAR; INTRAVENOUS
Status: COMPLETED
Start: 2019-08-30 | End: 2019-08-30

## 2019-08-30 RX ORDER — DIPHENHYDRAMINE HYDROCHLORIDE 50 MG/ML
INJECTION INTRAMUSCULAR; INTRAVENOUS PRN
Status: DISCONTINUED | OUTPATIENT
Start: 2019-08-30 | End: 2019-08-30 | Stop reason: SDUPTHER

## 2019-08-30 RX ORDER — DEXAMETHASONE SODIUM PHOSPHATE 4 MG/ML
INJECTION, SOLUTION INTRA-ARTICULAR; INTRALESIONAL; INTRAMUSCULAR; INTRAVENOUS; SOFT TISSUE PRN
Status: DISCONTINUED | OUTPATIENT
Start: 2019-08-30 | End: 2019-08-30 | Stop reason: SDUPTHER

## 2019-08-30 RX ORDER — OXYCODONE HYDROCHLORIDE AND ACETAMINOPHEN 5; 325 MG/1; MG/1
1 TABLET ORAL EVERY 6 HOURS PRN
Status: ON HOLD | COMMUNITY
End: 2019-11-05

## 2019-08-30 RX ORDER — PROPOFOL 10 MG/ML
INJECTION, EMULSION INTRAVENOUS PRN
Status: DISCONTINUED | OUTPATIENT
Start: 2019-08-30 | End: 2019-08-30 | Stop reason: SDUPTHER

## 2019-08-30 RX ORDER — LIDOCAINE HYDROCHLORIDE 20 MG/ML
INJECTION, SOLUTION INFILTRATION; PERINEURAL PRN
Status: DISCONTINUED | OUTPATIENT
Start: 2019-08-30 | End: 2019-08-30 | Stop reason: SDUPTHER

## 2019-08-30 RX ADMIN — PROMETHAZINE HYDROCHLORIDE 12.5 MG: 25 INJECTION INTRAMUSCULAR; INTRAVENOUS at 13:26

## 2019-08-30 RX ADMIN — DIPHENHYDRAMINE HYDROCHLORIDE 25 MG: 50 INJECTION, SOLUTION INTRAMUSCULAR; INTRAVENOUS at 13:25

## 2019-08-30 RX ADMIN — SODIUM CHLORIDE, SODIUM LACTATE, POTASSIUM CHLORIDE, AND CALCIUM CHLORIDE: 600; 310; 30; 20 INJECTION, SOLUTION INTRAVENOUS at 13:22

## 2019-08-30 RX ADMIN — LIDOCAINE HYDROCHLORIDE 100 MG: 20 INJECTION, SOLUTION INFILTRATION; PERINEURAL at 13:27

## 2019-08-30 RX ADMIN — PROPOFOL 50 MG: 10 INJECTION, EMULSION INTRAVENOUS at 13:51

## 2019-08-30 RX ADMIN — PROPOFOL 100 MG: 10 INJECTION, EMULSION INTRAVENOUS at 13:27

## 2019-08-30 RX ADMIN — PROPOFOL 50 MG: 10 INJECTION, EMULSION INTRAVENOUS at 13:46

## 2019-08-30 RX ADMIN — DEXAMETHASONE SODIUM PHOSPHATE 4 MG: 4 INJECTION, SOLUTION INTRAMUSCULAR; INTRAVENOUS at 13:35

## 2019-08-30 RX ADMIN — PROPOFOL 50 MG: 10 INJECTION, EMULSION INTRAVENOUS at 13:40

## 2019-08-30 RX ADMIN — PROPOFOL 100 MG: 10 INJECTION, EMULSION INTRAVENOUS at 13:33

## 2019-08-30 ASSESSMENT — PAIN - FUNCTIONAL ASSESSMENT
PAIN_FUNCTIONAL_ASSESSMENT: 0-10
PAIN_FUNCTIONAL_ASSESSMENT: ACTIVITIES ARE NOT PREVENTED

## 2019-08-30 ASSESSMENT — PAIN DESCRIPTION - LOCATION: LOCATION: ABDOMEN

## 2019-08-30 ASSESSMENT — PAIN DESCRIPTION - DESCRIPTORS
DESCRIPTORS: NAGGING;SHARP
DESCRIPTORS: CRAMPING
DESCRIPTORS: SHARP;ACHING
DESCRIPTORS: CRAMPING

## 2019-08-30 ASSESSMENT — PULMONARY FUNCTION TESTS
PIF_VALUE: 0

## 2019-08-30 ASSESSMENT — PAIN DESCRIPTION - FREQUENCY: FREQUENCY: CONTINUOUS

## 2019-08-30 ASSESSMENT — PAIN DESCRIPTION - ORIENTATION: ORIENTATION: RIGHT;LOWER

## 2019-08-30 ASSESSMENT — PAIN SCALES - GENERAL: PAINLEVEL_OUTOF10: 8

## 2019-08-31 NOTE — OP NOTE
[address], [address]                                OPERATIVE REPORT    PATIENT NAME: Mariah Rodriguez                      :        1970  MED REC NO:   [id number]                          ROOM:  ACCOUNT NO:   [de-identified]                           ADMIT DATE: 2019  PROVIDER:     Dorinda Valles MD    DATE OF PROCEDURE:  2019    INDICATION FOR PROCEDURE:  A pleasant 26-year-old woman presented with  change in the pattern of bowel movement, lower abdominal pain, family  history of colon cancer. SURGEON:  Dorinda Valles MD    COLONOSCOPY:  With the patient in the left lateral position and after IV  Diprivan, the Olympus video colonoscope was then inserted into the  rectum and carefully advanced to the cecum. A newly 2-cm polyp noted in  the sigmoid. We removed it with the polypectomy snare technique. The  site was then clipped using Resolution clip to prevent bleeding. Another polyp was noted in the ascending colon which we removed with the  biopsy forceps. Random biopsies were obtained to rule out collagenous  colitis as the patient has had history of diarrhea. The scope was then  removed without complications. IMPRESSION:  1. Ascending colon polyp. 2.  Sigmoid polyp. EBL: none        Ame Coley MD    D: 2019 14:08:37       T: 2019 17:39:55     [email or web address]  Job#: [id number]     Doc#: [id number]    CC:   MD Anu Garcia MD

## 2019-11-05 ENCOUNTER — HOSPITAL ENCOUNTER (OUTPATIENT)
Age: 49
Setting detail: OUTPATIENT SURGERY
Discharge: HOME | End: 2019-11-05
Attending: INTERNAL MEDICINE | Admitting: INTERNAL MEDICINE
Payer: COMMERCIAL

## 2019-11-05 VITALS
SYSTOLIC BLOOD PRESSURE: 127 MMHG | OXYGEN SATURATION: 91 % | HEIGHT: 65 IN | RESPIRATION RATE: 16 BRPM | BODY MASS INDEX: 39.99 KG/M2 | WEIGHT: 240 LBS | DIASTOLIC BLOOD PRESSURE: 70 MMHG | TEMPERATURE: 97.7 F | HEART RATE: 106 BPM

## 2019-11-05 PROCEDURE — 6370000000 HC RX 637 (ALT 250 FOR IP): Performed by: INTERNAL MEDICINE

## 2019-11-05 PROCEDURE — 88342 IMHCHEM/IMCYTCHM 1ST ANTB: CPT

## 2019-11-05 PROCEDURE — 7100000010 HC PHASE II RECOVERY - FIRST 15 MIN: Performed by: INTERNAL MEDICINE

## 2019-11-05 PROCEDURE — 6360000002 HC RX W HCPCS

## 2019-11-05 PROCEDURE — 6360000002 HC RX W HCPCS: Performed by: INTERNAL MEDICINE

## 2019-11-05 PROCEDURE — 2709999900 HC NON-CHARGEABLE SUPPLY: Performed by: INTERNAL MEDICINE

## 2019-11-05 PROCEDURE — 88305 TISSUE EXAM BY PATHOLOGIST: CPT

## 2019-11-05 PROCEDURE — 7100000011 HC PHASE II RECOVERY - ADDTL 15 MIN: Performed by: INTERNAL MEDICINE

## 2019-11-05 PROCEDURE — 99152 MOD SED SAME PHYS/QHP 5/>YRS: CPT | Performed by: INTERNAL MEDICINE

## 2019-11-05 PROCEDURE — 3609012400 HC EGD TRANSORAL BIOPSY SINGLE/MULTIPLE: Performed by: INTERNAL MEDICINE

## 2019-11-05 RX ORDER — PROMETHAZINE HYDROCHLORIDE 25 MG/ML
INJECTION, SOLUTION INTRAMUSCULAR; INTRAVENOUS
Status: COMPLETED
Start: 2019-11-05 | End: 2019-11-05

## 2019-11-05 RX ORDER — MIDAZOLAM HYDROCHLORIDE 1 MG/ML
INJECTION INTRAMUSCULAR; INTRAVENOUS PRN
Status: DISCONTINUED | OUTPATIENT
Start: 2019-11-05 | End: 2019-11-05 | Stop reason: ALTCHOICE

## 2019-11-05 RX ORDER — MEPERIDINE HYDROCHLORIDE 50 MG/ML
INJECTION INTRAMUSCULAR; INTRAVENOUS; SUBCUTANEOUS PRN
Status: DISCONTINUED | OUTPATIENT
Start: 2019-11-05 | End: 2019-11-05 | Stop reason: ALTCHOICE

## 2019-11-05 RX ORDER — PROMETHAZINE HCL 50 MG
50 TABLET ORAL EVERY 8 HOURS PRN
COMMUNITY
Start: 2019-08-27 | End: 2020-07-13 | Stop reason: ALTCHOICE

## 2019-11-05 RX ORDER — PROMETHAZINE HYDROCHLORIDE 25 MG/ML
25 INJECTION, SOLUTION INTRAMUSCULAR; INTRAVENOUS ONCE
Status: COMPLETED | OUTPATIENT
Start: 2019-11-05 | End: 2019-11-05

## 2019-11-05 RX ADMIN — PROMETHAZINE HYDROCHLORIDE 25 MG: 25 INJECTION, SOLUTION INTRAMUSCULAR; INTRAVENOUS at 09:30

## 2019-11-05 RX ADMIN — PROMETHAZINE HYDROCHLORIDE 25 MG: 25 INJECTION INTRAMUSCULAR; INTRAVENOUS at 09:30

## 2019-11-05 ASSESSMENT — PAIN - FUNCTIONAL ASSESSMENT: PAIN_FUNCTIONAL_ASSESSMENT: 0-10

## 2019-11-05 ASSESSMENT — PAIN DESCRIPTION - DESCRIPTORS: DESCRIPTORS: DULL;ACHING

## 2019-12-13 ENCOUNTER — HOSPITAL ENCOUNTER (OUTPATIENT)
Age: 49
Setting detail: OUTPATIENT SURGERY
Discharge: HOME | End: 2019-12-13
Attending: INTERNAL MEDICINE | Admitting: INTERNAL MEDICINE
Payer: COMMERCIAL

## 2019-12-13 VITALS
TEMPERATURE: 98.3 F | WEIGHT: 240 LBS | DIASTOLIC BLOOD PRESSURE: 92 MMHG | SYSTOLIC BLOOD PRESSURE: 158 MMHG | HEART RATE: 116 BPM | BODY MASS INDEX: 39.99 KG/M2 | HEIGHT: 65 IN | RESPIRATION RATE: 16 BRPM | OXYGEN SATURATION: 95 %

## 2019-12-13 DIAGNOSIS — K22.719 BARRETT'S ESOPHAGUS WITH DYSPLASIA: ICD-10-CM

## 2019-12-13 PROCEDURE — 7100000011 HC PHASE II RECOVERY - ADDTL 15 MIN: Performed by: INTERNAL MEDICINE

## 2019-12-13 PROCEDURE — 6360000002 HC RX W HCPCS: Performed by: INTERNAL MEDICINE

## 2019-12-13 PROCEDURE — 3609012400 HC EGD TRANSORAL BIOPSY SINGLE/MULTIPLE: Performed by: INTERNAL MEDICINE

## 2019-12-13 PROCEDURE — 7100000010 HC PHASE II RECOVERY - FIRST 15 MIN: Performed by: INTERNAL MEDICINE

## 2019-12-13 PROCEDURE — 99152 MOD SED SAME PHYS/QHP 5/>YRS: CPT | Performed by: INTERNAL MEDICINE

## 2019-12-13 PROCEDURE — 2709999900 HC NON-CHARGEABLE SUPPLY: Performed by: INTERNAL MEDICINE

## 2019-12-13 PROCEDURE — 2580000003 HC RX 258: Performed by: INTERNAL MEDICINE

## 2019-12-13 PROCEDURE — 6370000000 HC RX 637 (ALT 250 FOR IP): Performed by: INTERNAL MEDICINE

## 2019-12-13 PROCEDURE — 6360000002 HC RX W HCPCS

## 2019-12-13 PROCEDURE — 88305 TISSUE EXAM BY PATHOLOGIST: CPT

## 2019-12-13 RX ORDER — MIDAZOLAM HYDROCHLORIDE 1 MG/ML
INJECTION INTRAMUSCULAR; INTRAVENOUS PRN
Status: DISCONTINUED | OUTPATIENT
Start: 2019-12-13 | End: 2019-12-13 | Stop reason: ALTCHOICE

## 2019-12-13 RX ORDER — SODIUM CHLORIDE 9 MG/ML
INJECTION, SOLUTION INTRAVENOUS CONTINUOUS
Status: DISCONTINUED | OUTPATIENT
Start: 2019-12-13 | End: 2019-12-13 | Stop reason: HOSPADM

## 2019-12-13 RX ORDER — PROMETHAZINE HYDROCHLORIDE 25 MG/ML
INJECTION, SOLUTION INTRAMUSCULAR; INTRAVENOUS
Status: COMPLETED
Start: 2019-12-13 | End: 2019-12-13

## 2019-12-13 RX ORDER — MEPERIDINE HYDROCHLORIDE 50 MG/ML
INJECTION INTRAMUSCULAR; INTRAVENOUS; SUBCUTANEOUS PRN
Status: DISCONTINUED | OUTPATIENT
Start: 2019-12-13 | End: 2019-12-13 | Stop reason: ALTCHOICE

## 2019-12-13 RX ORDER — OXYCODONE AND ACETAMINOPHEN 7.5; 325 MG/1; MG/1
1 TABLET ORAL EVERY 4 HOURS PRN
COMMUNITY
End: 2020-04-25

## 2019-12-13 RX ADMIN — PROMETHAZINE HYDROCHLORIDE 6.25 MG: 25 INJECTION INTRAMUSCULAR; INTRAVENOUS at 14:39

## 2019-12-13 RX ADMIN — SODIUM CHLORIDE: 9 INJECTION, SOLUTION INTRAVENOUS at 13:37

## 2019-12-13 ASSESSMENT — PAIN - FUNCTIONAL ASSESSMENT
PAIN_FUNCTIONAL_ASSESSMENT: FACES
PAIN_FUNCTIONAL_ASSESSMENT: 0-10
PAIN_FUNCTIONAL_ASSESSMENT: FACES
PAIN_FUNCTIONAL_ASSESSMENT: 0-10

## 2019-12-13 ASSESSMENT — PAIN SCALES - GENERAL: PAINLEVEL_OUTOF10: 0

## 2020-04-25 ENCOUNTER — HOSPITAL ENCOUNTER (EMERGENCY)
Age: 50
Discharge: HOME | End: 2020-04-25
Attending: EMERGENCY MEDICINE
Payer: COMMERCIAL

## 2020-04-25 VITALS
DIASTOLIC BLOOD PRESSURE: 74 MMHG | TEMPERATURE: 98.2 F | HEART RATE: 99 BPM | OXYGEN SATURATION: 96 % | BODY MASS INDEX: 38.32 KG/M2 | SYSTOLIC BLOOD PRESSURE: 127 MMHG | RESPIRATION RATE: 18 BRPM | WEIGHT: 230 LBS | HEIGHT: 65 IN

## 2020-04-25 LAB
ANION GAP SERPL CALCULATED.3IONS-SCNC: 12 MMOL/L (ref 3–16)
BASOPHILS ABSOLUTE: 0.1 K/UL (ref 0–0.2)
BASOPHILS RELATIVE PERCENT: 1 %
BILIRUBIN URINE: NEGATIVE
BLOOD, URINE: NEGATIVE
BUN BLDV-MCNC: 9 MG/DL (ref 7–20)
CALCIUM SERPL-MCNC: 9.2 MG/DL (ref 8.3–10.6)
CHLORIDE BLD-SCNC: 107 MMOL/L (ref 99–110)
CLARITY: CLEAR
CO2: 24 MMOL/L (ref 21–32)
COLOR: YELLOW
CREAT SERPL-MCNC: 0.8 MG/DL (ref 0.6–1.1)
EOSINOPHILS ABSOLUTE: 0.5 K/UL (ref 0–0.6)
EOSINOPHILS RELATIVE PERCENT: 5.3 %
EPITHELIAL CELLS, UA: ABNORMAL /HPF (ref 0–5)
GFR AFRICAN AMERICAN: >60
GFR NON-AFRICAN AMERICAN: >60
GLUCOSE BLD-MCNC: 84 MG/DL (ref 70–99)
GLUCOSE URINE: NEGATIVE MG/DL
HCT VFR BLD CALC: 40.7 % (ref 36–48)
HEMOGLOBIN: 13.6 G/DL (ref 12–16)
KETONES, URINE: NEGATIVE MG/DL
LEUKOCYTE ESTERASE, URINE: ABNORMAL
LYMPHOCYTES ABSOLUTE: 3.1 K/UL (ref 1–5.1)
LYMPHOCYTES RELATIVE PERCENT: 36.3 %
MCH RBC QN AUTO: 28.3 PG (ref 26–34)
MCHC RBC AUTO-ENTMCNC: 33.3 G/DL (ref 31–36)
MCV RBC AUTO: 84.9 FL (ref 80–100)
MICROSCOPIC EXAMINATION: YES
MONOCYTES ABSOLUTE: 0.8 K/UL (ref 0–1.3)
MONOCYTES RELATIVE PERCENT: 9.4 %
NEUTROPHILS ABSOLUTE: 4.1 K/UL (ref 1.7–7.7)
NEUTROPHILS RELATIVE PERCENT: 48 %
NITRITE, URINE: NEGATIVE
PDW BLD-RTO: 14.3 % (ref 12.4–15.4)
PH UA: 6 (ref 5–8)
PLATELET # BLD: 306 K/UL (ref 135–450)
PMV BLD AUTO: 7.7 FL (ref 5–10.5)
POTASSIUM REFLEX MAGNESIUM: 4 MMOL/L (ref 3.5–5.1)
PROTEIN UA: NEGATIVE MG/DL
RBC # BLD: 4.79 M/UL (ref 4–5.2)
RBC UA: ABNORMAL /HPF (ref 0–4)
SODIUM BLD-SCNC: 143 MMOL/L (ref 136–145)
SPECIFIC GRAVITY UA: 1.01 (ref 1–1.03)
URINE REFLEX TO CULTURE: YES
URINE TYPE: ABNORMAL
UROBILINOGEN, URINE: 0.2 E.U./DL
WBC # BLD: 8.6 K/UL (ref 4–11)
WBC UA: ABNORMAL /HPF (ref 0–5)

## 2020-04-25 PROCEDURE — 99283 EMERGENCY DEPT VISIT LOW MDM: CPT

## 2020-04-25 PROCEDURE — 2580000003 HC RX 258: Performed by: STUDENT IN AN ORGANIZED HEALTH CARE EDUCATION/TRAINING PROGRAM

## 2020-04-25 PROCEDURE — 96365 THER/PROPH/DIAG IV INF INIT: CPT

## 2020-04-25 PROCEDURE — 81001 URINALYSIS AUTO W/SCOPE: CPT

## 2020-04-25 PROCEDURE — 6360000002 HC RX W HCPCS: Performed by: STUDENT IN AN ORGANIZED HEALTH CARE EDUCATION/TRAINING PROGRAM

## 2020-04-25 PROCEDURE — 85025 COMPLETE CBC W/AUTO DIFF WBC: CPT

## 2020-04-25 PROCEDURE — 96375 TX/PRO/DX INJ NEW DRUG ADDON: CPT

## 2020-04-25 PROCEDURE — 96376 TX/PRO/DX INJ SAME DRUG ADON: CPT

## 2020-04-25 PROCEDURE — 80048 BASIC METABOLIC PNL TOTAL CA: CPT

## 2020-04-25 PROCEDURE — 6360000002 HC RX W HCPCS

## 2020-04-25 PROCEDURE — 87086 URINE CULTURE/COLONY COUNT: CPT

## 2020-04-25 RX ORDER — SODIUM CHLORIDE, SODIUM LACTATE, POTASSIUM CHLORIDE, AND CALCIUM CHLORIDE .6; .31; .03; .02 G/100ML; G/100ML; G/100ML; G/100ML
30 INJECTION, SOLUTION INTRAVENOUS ONCE
Status: COMPLETED | OUTPATIENT
Start: 2020-04-25 | End: 2020-04-25

## 2020-04-25 RX ORDER — KETOROLAC TROMETHAMINE 30 MG/ML
15 INJECTION, SOLUTION INTRAMUSCULAR; INTRAVENOUS ONCE
Status: COMPLETED | OUTPATIENT
Start: 2020-04-25 | End: 2020-04-25

## 2020-04-25 RX ORDER — OXYCODONE HYDROCHLORIDE AND ACETAMINOPHEN 5; 325 MG/1; MG/1
1 TABLET ORAL EVERY 6 HOURS PRN
Qty: 12 TABLET | Refills: 0 | Status: SHIPPED | OUTPATIENT
Start: 2020-04-25 | End: 2020-04-28

## 2020-04-25 RX ORDER — PROMETHAZINE HYDROCHLORIDE 25 MG/ML
12.5 INJECTION, SOLUTION INTRAMUSCULAR; INTRAVENOUS ONCE
Status: COMPLETED | OUTPATIENT
Start: 2020-04-25 | End: 2020-04-25

## 2020-04-25 RX ORDER — CIPROFLOXACIN 500 MG/1
500 TABLET, FILM COATED ORAL 2 TIMES DAILY
Qty: 28 TABLET | Refills: 0 | Status: SHIPPED | OUTPATIENT
Start: 2020-04-25 | End: 2020-05-09

## 2020-04-25 RX ORDER — ONDANSETRON 2 MG/ML
8 INJECTION INTRAMUSCULAR; INTRAVENOUS ONCE
Status: DISCONTINUED | OUTPATIENT
Start: 2020-04-25 | End: 2020-04-25

## 2020-04-25 RX ADMIN — HYDROMORPHONE HYDROCHLORIDE 1 MG: 1 INJECTION, SOLUTION INTRAMUSCULAR; INTRAVENOUS; SUBCUTANEOUS at 17:58

## 2020-04-25 RX ADMIN — HYDROMORPHONE HYDROCHLORIDE 1 MG: 1 INJECTION, SOLUTION INTRAMUSCULAR; INTRAVENOUS; SUBCUTANEOUS at 16:53

## 2020-04-25 RX ADMIN — Medication 1 MG: at 17:58

## 2020-04-25 RX ADMIN — SODIUM CHLORIDE, POTASSIUM CHLORIDE, SODIUM LACTATE AND CALCIUM CHLORIDE 3129 ML: 600; 310; 30; 20 INJECTION, SOLUTION INTRAVENOUS at 15:07

## 2020-04-25 RX ADMIN — PROMETHAZINE HYDROCHLORIDE 12.5 MG: 25 INJECTION INTRAMUSCULAR; INTRAVENOUS at 15:22

## 2020-04-25 RX ADMIN — CEFEPIME 1 G: 1 INJECTION, POWDER, FOR SOLUTION INTRAMUSCULAR; INTRAVENOUS at 16:50

## 2020-04-25 RX ADMIN — HYDROMORPHONE HYDROCHLORIDE 1 MG: 1 INJECTION, SOLUTION INTRAMUSCULAR; INTRAVENOUS; SUBCUTANEOUS at 15:23

## 2020-04-25 RX ADMIN — KETOROLAC TROMETHAMINE 15 MG: 30 INJECTION, SOLUTION INTRAMUSCULAR; INTRAVENOUS at 15:09

## 2020-04-25 ASSESSMENT — PAIN SCALES - GENERAL
PAINLEVEL_OUTOF10: 8
PAINLEVEL_OUTOF10: 10
PAINLEVEL_OUTOF10: 7
PAINLEVEL_OUTOF10: 10

## 2020-04-25 NOTE — ED PROVIDER NOTES
Take 100 mg by mouth nightly    ATORVASTATIN (LIPITOR) 40 MG TABLET    Take 40 mg by mouth    CYCLOBENZAPRINE (FLEXERIL) 5 MG TABLET    Take 5 mg by mouth 3 times daily as needed for Muscle spasms    DULOXETINE HCL (CYMBALTA PO)    Take 90 mg by mouth daily    ESTRADIOL (VIVELLE) 0.075 MG/24HR    Apply 1 patch topically    PANTOPRAZOLE (PROTONIX) 40 MG TABLET    2 times daily     PROMETHAZINE (PHENERGAN) 50 MG TABLET    Take 50 mg by mouth every 8 hours as needed       Allergies     She is allergic to rocephin [ceftriaxone]. Physical Exam     INITIAL VITALS: BP: 137/83, Temp: 98.2 °F (36.8 °C), Pulse: 108, Resp: 18, SpO2: 97 %   Physical Exam  Constitutional:       General: She is not in acute distress. Appearance: She is obese. She is not ill-appearing, toxic-appearing or diaphoretic. HENT:      Head: Normocephalic and atraumatic. Nose: Nose normal. No congestion or rhinorrhea. Mouth/Throat:      Mouth: Mucous membranes are moist.   Eyes:      Pupils: Pupils are equal, round, and reactive to light. Neck:      Musculoskeletal: Normal range of motion. Cardiovascular:      Rate and Rhythm: Normal rate and regular rhythm. Heart sounds: No murmur. No gallop. Pulmonary:      Effort: Pulmonary effort is normal. No respiratory distress. Breath sounds: No wheezing, rhonchi or rales. Abdominal:      General: There is no distension. Palpations: Abdomen is soft. Tenderness: There is no abdominal tenderness. There is no guarding or rebound. Comments: Right-sided CVA tenderness present, left-sided CVA tenderness absent. Abdomen soft, nontender. Musculoskeletal: Normal range of motion. General: No swelling or deformity. Skin:     General: Skin is warm and dry. Capillary Refill: Capillary refill takes less than 2 seconds. Neurological:      General: No focal deficit present. Mental Status: She is alert and oriented to person, place, and time.

## 2020-04-27 LAB — URINE CULTURE, ROUTINE: NORMAL

## 2020-05-04 ENCOUNTER — OFFICE VISIT (OUTPATIENT)
Dept: PRIMARY CARE CLINIC | Age: 50
End: 2020-05-04

## 2020-05-04 PROCEDURE — 99999 PR OFFICE/OUTPT VISIT,PROCEDURE ONLY: CPT | Performed by: NURSE PRACTITIONER

## 2020-05-05 LAB
SARS-COV-2: NOT DETECTED
SOURCE: NORMAL

## 2020-07-13 ENCOUNTER — APPOINTMENT (OUTPATIENT)
Dept: CT IMAGING | Age: 50
End: 2020-07-13
Payer: COMMERCIAL

## 2020-07-13 ENCOUNTER — HOSPITAL ENCOUNTER (EMERGENCY)
Age: 50
Discharge: HOME | End: 2020-07-13
Attending: EMERGENCY MEDICINE
Payer: COMMERCIAL

## 2020-07-13 VITALS
OXYGEN SATURATION: 97 % | RESPIRATION RATE: 17 BRPM | SYSTOLIC BLOOD PRESSURE: 133 MMHG | HEIGHT: 65 IN | DIASTOLIC BLOOD PRESSURE: 76 MMHG | BODY MASS INDEX: 39.99 KG/M2 | TEMPERATURE: 97.6 F | WEIGHT: 240 LBS | HEART RATE: 121 BPM

## 2020-07-13 LAB
ANION GAP SERPL CALCULATED.3IONS-SCNC: 10 MMOL/L (ref 3–16)
BASOPHILS ABSOLUTE: 0 K/UL (ref 0–0.2)
BASOPHILS RELATIVE PERCENT: 0.2 %
BILIRUBIN URINE: NEGATIVE
BLOOD, URINE: NEGATIVE
BUN BLDV-MCNC: 14 MG/DL (ref 7–20)
CALCIUM SERPL-MCNC: 9 MG/DL (ref 8.3–10.6)
CHLORIDE BLD-SCNC: 104 MMOL/L (ref 99–110)
CLARITY: CLEAR
CO2: 24 MMOL/L (ref 21–32)
COLOR: YELLOW
CREAT SERPL-MCNC: 0.8 MG/DL (ref 0.6–1.1)
EOSINOPHILS ABSOLUTE: 0.3 K/UL (ref 0–0.6)
EOSINOPHILS RELATIVE PERCENT: 3.5 %
GFR AFRICAN AMERICAN: >60
GFR NON-AFRICAN AMERICAN: >60
GLUCOSE BLD-MCNC: 96 MG/DL (ref 70–99)
GLUCOSE URINE: NEGATIVE MG/DL
HCT VFR BLD CALC: 41.2 % (ref 36–48)
HEMOGLOBIN: 13.8 G/DL (ref 12–16)
KETONES, URINE: NEGATIVE MG/DL
LEUKOCYTE ESTERASE, URINE: NEGATIVE
LYMPHOCYTES ABSOLUTE: 2.9 K/UL (ref 1–5.1)
LYMPHOCYTES RELATIVE PERCENT: 35.9 %
MCH RBC QN AUTO: 29.1 PG (ref 26–34)
MCHC RBC AUTO-ENTMCNC: 33.6 G/DL (ref 31–36)
MCV RBC AUTO: 86.6 FL (ref 80–100)
MICROSCOPIC EXAMINATION: NORMAL
MONOCYTES ABSOLUTE: 0.7 K/UL (ref 0–1.3)
MONOCYTES RELATIVE PERCENT: 9 %
NEUTROPHILS ABSOLUTE: 4.1 K/UL (ref 1.7–7.7)
NEUTROPHILS RELATIVE PERCENT: 51.4 %
NITRITE, URINE: NEGATIVE
PDW BLD-RTO: 15.6 % (ref 12.4–15.4)
PH UA: 6 (ref 5–8)
PLATELET # BLD: 298 K/UL (ref 135–450)
PMV BLD AUTO: 7.7 FL (ref 5–10.5)
POTASSIUM REFLEX MAGNESIUM: 4 MMOL/L (ref 3.5–5.1)
PROTEIN UA: NEGATIVE MG/DL
RBC # BLD: 4.76 M/UL (ref 4–5.2)
SODIUM BLD-SCNC: 138 MMOL/L (ref 136–145)
SPECIFIC GRAVITY UA: 1.02 (ref 1–1.03)
URINE REFLEX TO CULTURE: NORMAL
URINE TYPE: NORMAL
UROBILINOGEN, URINE: 1 E.U./DL
WBC # BLD: 8 K/UL (ref 4–11)

## 2020-07-13 PROCEDURE — 85025 COMPLETE CBC W/AUTO DIFF WBC: CPT

## 2020-07-13 PROCEDURE — 80048 BASIC METABOLIC PNL TOTAL CA: CPT

## 2020-07-13 PROCEDURE — 6370000000 HC RX 637 (ALT 250 FOR IP): Performed by: STUDENT IN AN ORGANIZED HEALTH CARE EDUCATION/TRAINING PROGRAM

## 2020-07-13 PROCEDURE — 81003 URINALYSIS AUTO W/O SCOPE: CPT

## 2020-07-13 PROCEDURE — 99284 EMERGENCY DEPT VISIT MOD MDM: CPT

## 2020-07-13 PROCEDURE — 96374 THER/PROPH/DIAG INJ IV PUSH: CPT

## 2020-07-13 PROCEDURE — 2580000003 HC RX 258: Performed by: STUDENT IN AN ORGANIZED HEALTH CARE EDUCATION/TRAINING PROGRAM

## 2020-07-13 PROCEDURE — 74176 CT ABD & PELVIS W/O CONTRAST: CPT

## 2020-07-13 PROCEDURE — 96375 TX/PRO/DX INJ NEW DRUG ADDON: CPT

## 2020-07-13 PROCEDURE — 6360000002 HC RX W HCPCS: Performed by: STUDENT IN AN ORGANIZED HEALTH CARE EDUCATION/TRAINING PROGRAM

## 2020-07-13 RX ORDER — PROMETHAZINE HYDROCHLORIDE 12.5 MG/1
12.5 TABLET ORAL 3 TIMES DAILY PRN
Qty: 21 TABLET | Refills: 0 | Status: SHIPPED | OUTPATIENT
Start: 2020-07-13 | End: 2020-07-20

## 2020-07-13 RX ORDER — SODIUM CHLORIDE, SODIUM LACTATE, POTASSIUM CHLORIDE, CALCIUM CHLORIDE 600; 310; 30; 20 MG/100ML; MG/100ML; MG/100ML; MG/100ML
1000 INJECTION, SOLUTION INTRAVENOUS ONCE
Status: COMPLETED | OUTPATIENT
Start: 2020-07-13 | End: 2020-07-13

## 2020-07-13 RX ORDER — OXYCODONE HYDROCHLORIDE AND ACETAMINOPHEN 5; 325 MG/1; MG/1
1 TABLET ORAL ONCE
Status: COMPLETED | OUTPATIENT
Start: 2020-07-13 | End: 2020-07-13

## 2020-07-13 RX ORDER — KETOROLAC TROMETHAMINE 30 MG/ML
15 INJECTION, SOLUTION INTRAMUSCULAR; INTRAVENOUS ONCE
Status: COMPLETED | OUTPATIENT
Start: 2020-07-13 | End: 2020-07-13

## 2020-07-13 RX ORDER — PHENAZOPYRIDINE HYDROCHLORIDE 200 MG/1
200 TABLET, FILM COATED ORAL 3 TIMES DAILY PRN
Qty: 6 TABLET | Refills: 0 | Status: SHIPPED | OUTPATIENT
Start: 2020-07-13 | End: 2020-07-15

## 2020-07-13 RX ORDER — PROMETHAZINE HYDROCHLORIDE 25 MG/ML
12.5 INJECTION, SOLUTION INTRAMUSCULAR; INTRAVENOUS ONCE
Status: COMPLETED | OUTPATIENT
Start: 2020-07-13 | End: 2020-07-13

## 2020-07-13 RX ADMIN — KETOROLAC TROMETHAMINE 15 MG: 30 INJECTION, SOLUTION INTRAMUSCULAR at 14:44

## 2020-07-13 RX ADMIN — OXYCODONE HYDROCHLORIDE AND ACETAMINOPHEN 1 TABLET: 5; 325 TABLET ORAL at 16:26

## 2020-07-13 RX ADMIN — SODIUM CHLORIDE, POTASSIUM CHLORIDE, SODIUM LACTATE AND CALCIUM CHLORIDE 1000 ML: 600; 310; 30; 20 INJECTION, SOLUTION INTRAVENOUS at 14:44

## 2020-07-13 RX ADMIN — PROMETHAZINE HYDROCHLORIDE 12.5 MG: 25 INJECTION INTRAMUSCULAR; INTRAVENOUS at 14:44

## 2020-07-13 ASSESSMENT — PAIN SCALES - GENERAL
PAINLEVEL_OUTOF10: 10
PAINLEVEL_OUTOF10: 8
PAINLEVEL_OUTOF10: 8

## 2020-07-13 ASSESSMENT — ENCOUNTER SYMPTOMS
VOMITING: 1
DIARRHEA: 0
ALLERGIC/IMMUNOLOGIC NEGATIVE: 1
ABDOMINAL PAIN: 1
EYES NEGATIVE: 1
CONSTIPATION: 0
SHORTNESS OF BREATH: 0
NAUSEA: 1
BACK PAIN: 1
BLOOD IN STOOL: 0

## 2020-07-13 ASSESSMENT — PAIN DESCRIPTION - LOCATION: LOCATION: FLANK

## 2020-07-13 ASSESSMENT — PAIN DESCRIPTION - PAIN TYPE: TYPE: ACUTE PAIN

## 2020-07-13 NOTE — ED NOTES
Pt dcd home in stable condition along with her belongings, scripts, and paperwork. Pt ambulatory at [place].      Tracie Pate RN  07/13/20 [id number]

## 2020-07-13 NOTE — ED PROVIDER NOTES
1 [facility]  EMERGENCY DEPARTMENT ENCOUNTER          [facility] RESIDENT NOTE       Date of evaluation: 7/13/2020    Chief Complaint     Flank Pain  Right abdomen/flank pain and painful urination. History of Present Illness     Angie Lin is a 53 y.o. female who presents c/o R kidney stone symptoms for the past 2 days. She reports that the pain starts in the R back/flank, described is constant achyness, and it radiates into her R lower abdomen and groin, which she describes as being intermittent and sharp, rated 10/10. She also reports nausea and 1 episode of nonbloody vomiting. She was unsure if shes had any blood in her urine, but does report pain with urination, increased urgency, and chills, but denies fevers. She has a PMH significant for recurrent nephrolithiasis, barrets esophagus, fibromyalgia, blood clots and restless leg syndrome. She did have a renal ultrasound on 5/14/20 but it was unremarkable bilaterally. Review of Systems     Review of Systems   Constitutional: Positive for chills. Negative for fever. HENT: Negative. Eyes: Negative. Respiratory: Negative for shortness of breath. Cardiovascular: Negative. Negative for chest pain and palpitations. Gastrointestinal: Positive for abdominal pain, nausea and vomiting. Negative for blood in stool, constipation and diarrhea. Genitourinary: Positive for dysuria, flank pain, hematuria and urgency. Negative for decreased urine volume and difficulty urinating. Musculoskeletal: Positive for back pain. Skin: Negative. Allergic/Immunologic: Negative. Neurological: Negative. Hematological: Negative. Psychiatric/Behavioral: Negative. All other systems reviewed and are negative. Past Medical, Surgical, Family, and Social History     She has a past medical history of Villela's esophagus, Fibromyalgia, Hx of blood clots, Kidney stone, and PONV (postoperative nausea and vomiting).   She has a past surgical history that includes other surgical history (10/3/14); Appendectomy; Abdomen surgery; Cholecystectomy; Colonoscopy; Endoscopy, colon, diagnostic; Hysterectomy; Tonsillectomy; Upper gastrointestinal endoscopy (N/A, 8/17/2018); Upper gastrointestinal endoscopy (N/A, 4/16/2019); Upper gastrointestinal endoscopy (N/A, 5/28/2019); Breast surgery (Left); Colonoscopy (8/30/2019); Colonoscopy (8/30/2019); Upper gastrointestinal endoscopy (N/A, 11/5/2019); and Upper gastrointestinal endoscopy (N/A, 12/13/2019). Her family history is not on file. She reports that she has never smoked. She has never used smokeless tobacco. She reports that she does not drink alcohol or use drugs. Medications     Previous Medications    AMITRIPTYLINE (ELAVIL) 100 MG TABLET    Take 100 mg by mouth nightly    ATORVASTATIN (LIPITOR) 40 MG TABLET    Take 40 mg by mouth    CYCLOBENZAPRINE (FLEXERIL) 5 MG TABLET    Take 5 mg by mouth 3 times daily as needed for Muscle spasms    DULOXETINE HCL (CYMBALTA PO)    Take 90 mg by mouth daily    ESTRADIOL (VIVELLE) 0.075 MG/24HR    Apply 1 patch topically    PANTOPRAZOLE (PROTONIX) 40 MG TABLET    2 times daily        Allergies     She is allergic to rocephin [ceftriaxone]. Physical Exam     INITIAL VITALS: BP: 131/85, Temp: 97.6 °F (36.4 °C), Pulse: 121, Resp: 17, SpO2: 99 %   Physical Exam  Constitutional:       General: She is not in acute distress. Appearance: Normal appearance. She is obese. She is ill-appearing. HENT:      Head: Normocephalic and atraumatic. Eyes:      Extraocular Movements: Extraocular movements intact. Conjunctiva/sclera: Conjunctivae normal.      Pupils: Pupils are equal, round, and reactive to light. Cardiovascular:      Rate and Rhythm: Regular rhythm. Tachycardia present. Heart sounds: No murmur. No friction rub. No gallop. Pulmonary:      Effort: Pulmonary effort is normal. No respiratory distress. Breath sounds: Normal breath sounds. No stridor.  No wheezing or rales. Abdominal:      General: Abdomen is flat. Bowel sounds are normal. There is no distension. Palpations: Abdomen is soft. Tenderness: There is abdominal tenderness. There is right CVA tenderness. There is no left CVA tenderness or guarding. Comments: Tender to palpation in RLQ >> RUQ, also tender to palpation in suprapubic area. Mo renal, iliac or aortic bruits. Musculoskeletal:         General: Tenderness present. Right lower leg: No edema. Left lower leg: No edema. Comments: Tender to palpation over R back and flank in area in close proximity to kidneys. Skin:     General: Skin is warm and dry. Neurological:      General: No focal deficit present. Mental Status: She is alert and oriented to person, place, and time. Mental status is at baseline. Cranial Nerves: No cranial nerve deficit. Motor: No weakness. Psychiatric:         Mood and Affect: Mood normal.         Diagnostic Results       RADIOLOGY:  CT ABDOMEN PELVIS WO CONTRAST Additional Contrast? None   Final Result      No acute abdominopelvic abnormality. Nonobstructive right renal calculi measuring up to 4 mm. Hepatic steatosis. Small pericardial effusion.           LABS:   Results for orders placed or performed during the hospital encounter of 28/69/58   Basic Metabolic Panel w/ Reflex to MG   Result Value Ref Range    Sodium 138 136 - 145 mmol/L    Potassium reflex Magnesium 4.0 3.5 - 5.1 mmol/L    Chloride 104 99 - 110 mmol/L    CO2 24 21 - 32 mmol/L    Anion Gap 10 3 - 16    Glucose 96 70 - 99 mg/dL    BUN 14 7 - 20 mg/dL    CREATININE 0.8 0.6 - 1.1 mg/dL    GFR Non-African American >60 >60    GFR African American >60 >60    Calcium 9.0 8.3 - 10.6 mg/dL   Urinalysis Reflex to Culture    Specimen: Urine, clean catch   Result Value Ref Range    Color, UA Yellow Straw/Yellow    Clarity, UA Clear Clear    Glucose, Ur Negative Negative mg/dL    Bilirubin Urine Negative Negative    Ketones, Urine Negative Negative mg/dL    Specific Gravity, UA 1.025 1.005 - 1.030    Blood, Urine Negative Negative    pH, UA 6.0 5.0 - 8.0    Protein, UA Negative Negative mg/dL    Urobilinogen, Urine 1.0 <2.0 E.U./dL    Nitrite, Urine Negative Negative    Leukocyte Esterase, Urine Negative Negative    Microscopic Examination Not Indicated     Urine Type Urinebag     Urine Reflex to Culture Not Indicated    CBC Auto Differential   Result Value Ref Range    WBC 8.0 4.0 - 11.0 K/uL    RBC 4.76 4.00 - 5.20 M/uL    Hemoglobin 13.8 12.0 - 16.0 g/dL    Hematocrit 41.2 36.0 - 48.0 %    MCV 86.6 80.0 - 100.0 fL    MCH 29.1 26.0 - 34.0 pg    MCHC 33.6 31.0 - 36.0 g/dL    RDW 15.6 (H) 12.4 - 15.4 %    Platelets 889 435 - 761 K/uL    MPV 7.7 5.0 - 10.5 fL    Neutrophils % 51.4 %    Lymphocytes % 35.9 %    Monocytes % 9.0 %    Eosinophils % 3.5 %    Basophils % 0.2 %    Neutrophils Absolute 4.1 1.7 - 7.7 K/uL    Lymphocytes Absolute 2.9 1.0 - 5.1 K/uL    Monocytes Absolute 0.7 0.0 - 1.3 K/uL    Eosinophils Absolute 0.3 0.0 - 0.6 K/uL    Basophils Absolute 0.0 0.0 - 0.2 K/uL         RECENT VITALS:  BP: 133/76, Temp: 97.6 °F (36.4 °C),Pulse: 121, Resp: 17, SpO2: 97 %       ED Course     Nursing Notes, Past Medical Hx, Past Surgical Hx, Social Hx, Allergies, and FamilyHx were reviewed.     The patient was giventhe following medications:  Orders Placed This Encounter   Medications    ketorolac (TORADOL) injection 15 mg    promethazine (PHENERGAN) injection 12.5 mg    lactated ringers infusion 1,000 mL    oxyCODONE-acetaminophen (PERCOCET) 5-325 MG per tablet 1 tablet    phenazopyridine (PYRIDIUM) 200 MG tablet     Sig: Take 1 tablet by mouth 3 times daily as needed for Pain     Dispense:  6 tablet     Refill:  0    promethazine (PHENERGAN) 12.5 MG tablet     Sig: Take 1 tablet by mouth 3 times daily as needed for Nausea     Dispense:  21 tablet     Refill:  0       CONSULTS:  None    MEDICAL DECISION MAKING / ASSESSMENT / Lavelle Reid is a 53 y.o. female c/o constant achy R back/flank pain that radiates into her R lower abdomen and groin, becoming intermittent and sharp and rated 10/10 for the past 2 days. She also reports nausea and 1 episode of vomiting. She denies fevers, but does report dysuria, increased urgency, chills, and she is not sure if there has been blood in her urine. She has a PMH significant for recurrent nephrolithiasis, barrets esophagus, fibromyalgia, RLS and blood clots. She had a renal US performed on 5/14/20 which was negative for kidney stones bilaterally. The pt presents today in pain and unable to find a position of comfort. She is tachycardic and hypertensive. On physical exam she has R CVA tenderness, and is tender to palpation in the RLQ and suprapubic areas > RUQ. To evaluate for nephrolithiasis and pyelonephritis, I have ordered a CBC, BMP, UA with reflex to culture, and I have ordered a non-contrast CT abdomin pelvis. I have started her on 1L of LR as well as 15mg toradol IV for pain and 12.5mg phenergan IV for her nausea. The CBC, BMP and UA were unremarkable (no leukocytosis, no electrolyte abnormalities, no hematuria, negative nitrites and leukocytes). The CT abd/pelv with no contrast revealed a non-obstructing R renal calculi of 4mm, small pericardial effusion and hepatic steatosis. When discussing these results, pt complained that toradol was not helping with her pain. I gave her percocet 5-325mg PO to help with the discomfort. Pt feels better after percocet for pain. I discussed sending her home with a prescription for 12.5mg promethazine TID PO for nausea and pyridium 200mg TID PO to help with the pain. Pt has agreed with the plan and is ready to go home. This patient was also evaluated by the attending physician. All care plans were discussed and agreed upon. Clinical Impression     1.  Kidney stone on right side        Disposition     PATIENT REFERRED TO:  [facility] Emergency Department  [address]  [phone]    If symptoms worsen    MD Fanta Carbajal [phone]      As needed      DISCHARGE MEDICATIONS:  New Prescriptions    PHENAZOPYRIDINE (PYRIDIUM) 200 MG TABLET    Take 1 tablet by mouth 3 times daily as needed for Pain    PROMETHAZINE (PHENERGAN) 12.5 MG TABLET    Take 1 tablet by mouth 3 times daily as needed for Nausea       Arian Duran 34, DO  07/13/20 [id number]

## 2020-07-16 NOTE — ED PROVIDER NOTES
ED Attending Attestation Note     Date of evaluation: 7/13/2020    This patient was seen by the resident. I have seen and examined the patient, agree with the workup, evaluation, management and diagnosis. The care plan has been discussed. I have reviewed the ECG and concur with the resident's interpretation. My assessment reveals a 52 yo female presenting to the ED with complaints of right flank pain.   On examination the patient has right lower abdominal tenderness to palpation without peritoneal signs     Mohamud Lang MD  07/16/20 [id number]

## 2020-08-29 ENCOUNTER — HOSPITAL ENCOUNTER (EMERGENCY)
Age: 50
Discharge: HOME | End: 2020-08-30
Attending: EMERGENCY MEDICINE
Payer: COMMERCIAL

## 2020-08-29 LAB
A/G RATIO: 1.5 (ref 1.1–2.2)
ALBUMIN SERPL-MCNC: 4.1 G/DL (ref 3.4–5)
ALP BLD-CCNC: 59 U/L (ref 40–129)
ALT SERPL-CCNC: 74 U/L (ref 10–40)
ANION GAP SERPL CALCULATED.3IONS-SCNC: 10 MMOL/L (ref 3–16)
AST SERPL-CCNC: 54 U/L (ref 15–37)
BACTERIA: ABNORMAL /HPF
BASOPHILS ABSOLUTE: 0.1 K/UL (ref 0–0.2)
BASOPHILS RELATIVE PERCENT: 0.7 %
BILIRUB SERPL-MCNC: <0.2 MG/DL (ref 0–1)
BILIRUBIN URINE: NEGATIVE
BLOOD, URINE: NEGATIVE
BUN BLDV-MCNC: 15 MG/DL (ref 7–20)
CALCIUM SERPL-MCNC: 9.7 MG/DL (ref 8.3–10.6)
CHLORIDE BLD-SCNC: 106 MMOL/L (ref 99–110)
CLARITY: CLEAR
CO2: 27 MMOL/L (ref 21–32)
COLOR: YELLOW
CREAT SERPL-MCNC: 0.9 MG/DL (ref 0.6–1.1)
CRYSTALS, UA: ABNORMAL /HPF
EOSINOPHILS ABSOLUTE: 0.9 K/UL (ref 0–0.6)
EOSINOPHILS RELATIVE PERCENT: 8.3 %
EPITHELIAL CELLS, UA: ABNORMAL /HPF (ref 0–5)
GFR AFRICAN AMERICAN: >60
GFR NON-AFRICAN AMERICAN: >60
GLOBULIN: 2.7 G/DL
GLUCOSE BLD-MCNC: 96 MG/DL (ref 70–99)
GLUCOSE URINE: NEGATIVE MG/DL
HCG(URINE) PREGNANCY TEST: NEGATIVE
HCT VFR BLD CALC: 39 % (ref 36–48)
HEMOGLOBIN: 13.1 G/DL (ref 12–16)
KETONES, URINE: ABNORMAL MG/DL
LEUKOCYTE ESTERASE, URINE: ABNORMAL
LYMPHOCYTES ABSOLUTE: 3.6 K/UL (ref 1–5.1)
LYMPHOCYTES RELATIVE PERCENT: 34.5 %
MCH RBC QN AUTO: 29.1 PG (ref 26–34)
MCHC RBC AUTO-ENTMCNC: 33.6 G/DL (ref 31–36)
MCV RBC AUTO: 86.6 FL (ref 80–100)
MICROSCOPIC EXAMINATION: YES
MONOCYTES ABSOLUTE: 0.9 K/UL (ref 0–1.3)
MONOCYTES RELATIVE PERCENT: 8.7 %
NEUTROPHILS ABSOLUTE: 5 K/UL (ref 1.7–7.7)
NEUTROPHILS RELATIVE PERCENT: 47.8 %
NITRITE, URINE: NEGATIVE
PDW BLD-RTO: 14.9 % (ref 12.4–15.4)
PH UA: 6 (ref 5–8)
PLATELET # BLD: 302 K/UL (ref 135–450)
PMV BLD AUTO: 7.9 FL (ref 5–10.5)
POTASSIUM REFLEX MAGNESIUM: 4 MMOL/L (ref 3.5–5.1)
PROTEIN UA: NEGATIVE MG/DL
RBC # BLD: 4.5 M/UL (ref 4–5.2)
RBC UA: ABNORMAL /HPF (ref 0–4)
SODIUM BLD-SCNC: 143 MMOL/L (ref 136–145)
SPECIFIC GRAVITY UA: >=1.03 (ref 1–1.03)
TOTAL PROTEIN: 6.8 G/DL (ref 6.4–8.2)
URINE TYPE: ABNORMAL
UROBILINOGEN, URINE: 0.2 E.U./DL
WBC # BLD: 10.5 K/UL (ref 4–11)
WBC UA: ABNORMAL /HPF (ref 0–5)

## 2020-08-29 PROCEDURE — 6370000000 HC RX 637 (ALT 250 FOR IP): Performed by: EMERGENCY MEDICINE

## 2020-08-29 PROCEDURE — 96375 TX/PRO/DX INJ NEW DRUG ADDON: CPT

## 2020-08-29 PROCEDURE — 2580000003 HC RX 258: Performed by: EMERGENCY MEDICINE

## 2020-08-29 PROCEDURE — 84703 CHORIONIC GONADOTROPIN ASSAY: CPT

## 2020-08-29 PROCEDURE — 85025 COMPLETE CBC W/AUTO DIFF WBC: CPT

## 2020-08-29 PROCEDURE — 96374 THER/PROPH/DIAG INJ IV PUSH: CPT

## 2020-08-29 PROCEDURE — 81001 URINALYSIS AUTO W/SCOPE: CPT

## 2020-08-29 PROCEDURE — 99284 EMERGENCY DEPT VISIT MOD MDM: CPT

## 2020-08-29 PROCEDURE — 6360000002 HC RX W HCPCS: Performed by: EMERGENCY MEDICINE

## 2020-08-29 PROCEDURE — 96361 HYDRATE IV INFUSION ADD-ON: CPT

## 2020-08-29 PROCEDURE — 80053 COMPREHEN METABOLIC PANEL: CPT

## 2020-08-29 PROCEDURE — 36415 COLL VENOUS BLD VENIPUNCTURE: CPT

## 2020-08-29 RX ORDER — OXYCODONE HYDROCHLORIDE AND ACETAMINOPHEN 5; 325 MG/1; MG/1
2 TABLET ORAL ONCE
Status: COMPLETED | OUTPATIENT
Start: 2020-08-29 | End: 2020-08-29

## 2020-08-29 RX ORDER — KETOROLAC TROMETHAMINE 30 MG/ML
15 INJECTION, SOLUTION INTRAMUSCULAR; INTRAVENOUS ONCE
Status: COMPLETED | OUTPATIENT
Start: 2020-08-29 | End: 2020-08-29

## 2020-08-29 RX ORDER — PROMETHAZINE HYDROCHLORIDE 25 MG/ML
12.5 INJECTION, SOLUTION INTRAMUSCULAR; INTRAVENOUS ONCE
Status: COMPLETED | OUTPATIENT
Start: 2020-08-29 | End: 2020-08-29

## 2020-08-29 RX ORDER — FENTANYL CITRATE 50 UG/ML
50 INJECTION, SOLUTION INTRAMUSCULAR; INTRAVENOUS ONCE
Status: COMPLETED | OUTPATIENT
Start: 2020-08-29 | End: 2020-08-29

## 2020-08-29 RX ORDER — SODIUM CHLORIDE, SODIUM LACTATE, POTASSIUM CHLORIDE, CALCIUM CHLORIDE 600; 310; 30; 20 MG/100ML; MG/100ML; MG/100ML; MG/100ML
1000 INJECTION, SOLUTION INTRAVENOUS ONCE
Status: COMPLETED | OUTPATIENT
Start: 2020-08-29 | End: 2020-08-29

## 2020-08-29 RX ORDER — MORPHINE SULFATE 4 MG/ML
4 INJECTION, SOLUTION INTRAMUSCULAR; INTRAVENOUS ONCE
Status: COMPLETED | OUTPATIENT
Start: 2020-08-29 | End: 2020-08-29

## 2020-08-29 RX ORDER — OXYCODONE HYDROCHLORIDE AND ACETAMINOPHEN 5; 325 MG/1; MG/1
1 TABLET ORAL EVERY 6 HOURS PRN
Qty: 14 TABLET | Refills: 0 | Status: SHIPPED | OUTPATIENT
Start: 2020-08-29 | End: 2020-09-01

## 2020-08-29 RX ORDER — ONDANSETRON 4 MG/1
4 TABLET, ORALLY DISINTEGRATING ORAL EVERY 8 HOURS PRN
Qty: 20 TABLET | Refills: 0 | Status: SHIPPED | OUTPATIENT
Start: 2020-08-29 | End: 2021-01-30

## 2020-08-29 RX ADMIN — KETOROLAC TROMETHAMINE 15 MG: 30 INJECTION, SOLUTION INTRAMUSCULAR at 19:56

## 2020-08-29 RX ADMIN — OXYCODONE HYDROCHLORIDE AND ACETAMINOPHEN 2 TABLET: 5; 325 TABLET ORAL at 21:46

## 2020-08-29 RX ADMIN — FENTANYL CITRATE 50 MCG: 50 INJECTION INTRAMUSCULAR; INTRAVENOUS at 23:52

## 2020-08-29 RX ADMIN — MORPHINE SULFATE 4 MG: 4 INJECTION, SOLUTION INTRAMUSCULAR; INTRAVENOUS at 21:09

## 2020-08-29 RX ADMIN — PROMETHAZINE HYDROCHLORIDE 12.5 MG: 25 INJECTION INTRAMUSCULAR; INTRAVENOUS at 19:56

## 2020-08-29 RX ADMIN — SODIUM CHLORIDE, POTASSIUM CHLORIDE, SODIUM LACTATE AND CALCIUM CHLORIDE 1000 ML: 600; 310; 30; 20 INJECTION, SOLUTION INTRAVENOUS at 19:55

## 2020-08-29 ASSESSMENT — PAIN SCALES - GENERAL
PAINLEVEL_OUTOF10: 10
PAINLEVEL_OUTOF10: 6
PAINLEVEL_OUTOF10: 6
PAINLEVEL_OUTOF10: 9
PAINLEVEL_OUTOF10: 7
PAINLEVEL_OUTOF10: 10

## 2020-08-29 ASSESSMENT — PAIN DESCRIPTION - LOCATION
LOCATION: FLANK
LOCATION: ABDOMEN;GROIN

## 2020-08-29 ASSESSMENT — PAIN DESCRIPTION - ONSET: ONSET: ON-GOING

## 2020-08-29 ASSESSMENT — PAIN DESCRIPTION - ORIENTATION
ORIENTATION: RIGHT
ORIENTATION: RIGHT

## 2020-08-29 ASSESSMENT — PAIN DESCRIPTION - PROGRESSION: CLINICAL_PROGRESSION: NOT CHANGED

## 2020-08-29 ASSESSMENT — PAIN DESCRIPTION - PAIN TYPE
TYPE: ACUTE PAIN
TYPE: ACUTE PAIN

## 2020-08-29 ASSESSMENT — PAIN DESCRIPTION - DESCRIPTORS: DESCRIPTORS: DISCOMFORT

## 2020-08-29 ASSESSMENT — PAIN DESCRIPTION - FREQUENCY: FREQUENCY: CONTINUOUS

## 2020-08-30 VITALS
RESPIRATION RATE: 18 BRPM | WEIGHT: 240 LBS | HEART RATE: 120 BPM | HEIGHT: 65 IN | TEMPERATURE: 98.2 F | OXYGEN SATURATION: 96 % | SYSTOLIC BLOOD PRESSURE: 141 MMHG | DIASTOLIC BLOOD PRESSURE: 36 MMHG | BODY MASS INDEX: 39.99 KG/M2

## 2020-08-30 ASSESSMENT — PAIN DESCRIPTION - FREQUENCY: FREQUENCY: CONTINUOUS

## 2020-08-30 ASSESSMENT — PAIN SCALES - GENERAL: PAINLEVEL_OUTOF10: 4

## 2020-08-30 ASSESSMENT — PAIN DESCRIPTION - PROGRESSION: CLINICAL_PROGRESSION: GRADUALLY IMPROVING

## 2020-08-30 ASSESSMENT — PAIN DESCRIPTION - ONSET: ONSET: ON-GOING

## 2020-08-30 ASSESSMENT — PAIN DESCRIPTION - PAIN TYPE: TYPE: ACUTE PAIN

## 2020-08-30 ASSESSMENT — PAIN DESCRIPTION - ORIENTATION: ORIENTATION: RIGHT

## 2020-08-30 ASSESSMENT — PAIN DESCRIPTION - DESCRIPTORS: DESCRIPTORS: DISCOMFORT;PRESSURE

## 2020-08-30 ASSESSMENT — PAIN DESCRIPTION - LOCATION: LOCATION: ABDOMEN;GROIN

## 2020-11-07 ENCOUNTER — HOSPITAL ENCOUNTER (EMERGENCY)
Age: 50
Discharge: HOME | End: 2020-11-07
Attending: STUDENT IN AN ORGANIZED HEALTH CARE EDUCATION/TRAINING PROGRAM
Payer: COMMERCIAL

## 2020-11-07 VITALS
OXYGEN SATURATION: 96 % | TEMPERATURE: 98 F | HEART RATE: 78 BPM | HEIGHT: 65 IN | DIASTOLIC BLOOD PRESSURE: 65 MMHG | SYSTOLIC BLOOD PRESSURE: 116 MMHG | WEIGHT: 235 LBS | RESPIRATION RATE: 18 BRPM | BODY MASS INDEX: 39.15 KG/M2

## 2020-11-07 LAB
ANION GAP SERPL CALCULATED.3IONS-SCNC: 11 MMOL/L (ref 3–16)
BASOPHILS ABSOLUTE: 0.1 K/UL (ref 0–0.2)
BASOPHILS RELATIVE PERCENT: 1 %
BILIRUBIN URINE: NEGATIVE
BLOOD, URINE: NEGATIVE
BUN BLDV-MCNC: 10 MG/DL (ref 7–20)
CALCIUM SERPL-MCNC: 9.5 MG/DL (ref 8.3–10.6)
CHLORIDE BLD-SCNC: 104 MMOL/L (ref 99–110)
CLARITY: CLEAR
CO2: 24 MMOL/L (ref 21–32)
COLOR: YELLOW
CREAT SERPL-MCNC: 0.7 MG/DL (ref 0.6–1.1)
EOSINOPHILS ABSOLUTE: 0.2 K/UL (ref 0–0.6)
EOSINOPHILS RELATIVE PERCENT: 2.7 %
GFR AFRICAN AMERICAN: >60
GFR NON-AFRICAN AMERICAN: >60
GLUCOSE BLD-MCNC: 93 MG/DL (ref 70–99)
GLUCOSE URINE: NEGATIVE MG/DL
HCG(URINE) PREGNANCY TEST: NEGATIVE
HCT VFR BLD CALC: 43.7 % (ref 36–48)
HEMOGLOBIN: 14.4 G/DL (ref 12–16)
KETONES, URINE: NEGATIVE MG/DL
LEUKOCYTE ESTERASE, URINE: NEGATIVE
LYMPHOCYTES ABSOLUTE: 2.6 K/UL (ref 1–5.1)
LYMPHOCYTES RELATIVE PERCENT: 28.5 %
MCH RBC QN AUTO: 28.6 PG (ref 26–34)
MCHC RBC AUTO-ENTMCNC: 33 G/DL (ref 31–36)
MCV RBC AUTO: 86.8 FL (ref 80–100)
MICROSCOPIC EXAMINATION: NORMAL
MONOCYTES ABSOLUTE: 0.7 K/UL (ref 0–1.3)
MONOCYTES RELATIVE PERCENT: 7.1 %
NEUTROPHILS ABSOLUTE: 5.6 K/UL (ref 1.7–7.7)
NEUTROPHILS RELATIVE PERCENT: 60.7 %
NITRITE, URINE: NEGATIVE
PDW BLD-RTO: 14.2 % (ref 12.4–15.4)
PH UA: 6 (ref 5–8)
PLATELET # BLD: 262 K/UL (ref 135–450)
PMV BLD AUTO: 7.9 FL (ref 5–10.5)
POTASSIUM REFLEX MAGNESIUM: 4.9 MMOL/L (ref 3.5–5.1)
PROTEIN UA: NEGATIVE MG/DL
RBC # BLD: 5.04 M/UL (ref 4–5.2)
SODIUM BLD-SCNC: 139 MMOL/L (ref 136–145)
SPECIFIC GRAVITY UA: 1.02 (ref 1–1.03)
URINE TYPE: NORMAL
UROBILINOGEN, URINE: 0.2 E.U./DL
WBC # BLD: 9.3 K/UL (ref 4–11)

## 2020-11-07 PROCEDURE — 2580000003 HC RX 258: Performed by: PHYSICIAN ASSISTANT

## 2020-11-07 PROCEDURE — 85025 COMPLETE CBC W/AUTO DIFF WBC: CPT

## 2020-11-07 PROCEDURE — 99283 EMERGENCY DEPT VISIT LOW MDM: CPT

## 2020-11-07 PROCEDURE — 84703 CHORIONIC GONADOTROPIN ASSAY: CPT

## 2020-11-07 PROCEDURE — 81003 URINALYSIS AUTO W/O SCOPE: CPT

## 2020-11-07 PROCEDURE — 80048 BASIC METABOLIC PNL TOTAL CA: CPT

## 2020-11-07 PROCEDURE — 6370000000 HC RX 637 (ALT 250 FOR IP): Performed by: PHYSICIAN ASSISTANT

## 2020-11-07 PROCEDURE — 96375 TX/PRO/DX INJ NEW DRUG ADDON: CPT

## 2020-11-07 PROCEDURE — 36415 COLL VENOUS BLD VENIPUNCTURE: CPT

## 2020-11-07 PROCEDURE — 96374 THER/PROPH/DIAG INJ IV PUSH: CPT

## 2020-11-07 PROCEDURE — 6360000002 HC RX W HCPCS: Performed by: PHYSICIAN ASSISTANT

## 2020-11-07 RX ORDER — 0.9 % SODIUM CHLORIDE 0.9 %
1000 INTRAVENOUS SOLUTION INTRAVENOUS ONCE
Status: COMPLETED | OUTPATIENT
Start: 2020-11-07 | End: 2020-11-07

## 2020-11-07 RX ORDER — PROMETHAZINE HYDROCHLORIDE 25 MG/ML
12.5 INJECTION, SOLUTION INTRAMUSCULAR; INTRAVENOUS ONCE
Status: COMPLETED | OUTPATIENT
Start: 2020-11-07 | End: 2020-11-07

## 2020-11-07 RX ORDER — OXYCODONE HYDROCHLORIDE AND ACETAMINOPHEN 5; 325 MG/1; MG/1
1 TABLET ORAL ONCE
Status: COMPLETED | OUTPATIENT
Start: 2020-11-07 | End: 2020-11-07

## 2020-11-07 RX ORDER — OXYCODONE HYDROCHLORIDE AND ACETAMINOPHEN 5; 325 MG/1; MG/1
1 TABLET ORAL EVERY 6 HOURS PRN
Qty: 12 TABLET | Refills: 0 | Status: SHIPPED | OUTPATIENT
Start: 2020-11-07 | End: 2020-11-10

## 2020-11-07 RX ORDER — MORPHINE SULFATE 4 MG/ML
4 INJECTION, SOLUTION INTRAMUSCULAR; INTRAVENOUS ONCE
Status: COMPLETED | OUTPATIENT
Start: 2020-11-07 | End: 2020-11-07

## 2020-11-07 RX ORDER — ONDANSETRON 2 MG/ML
4 INJECTION INTRAMUSCULAR; INTRAVENOUS ONCE
Status: COMPLETED | OUTPATIENT
Start: 2020-11-07 | End: 2020-11-07

## 2020-11-07 RX ORDER — ONDANSETRON 4 MG/1
4 TABLET, ORALLY DISINTEGRATING ORAL EVERY 8 HOURS PRN
Qty: 12 TABLET | Refills: 0 | Status: SHIPPED | OUTPATIENT
Start: 2020-11-07 | End: 2021-05-10

## 2020-11-07 RX ORDER — TAMSULOSIN HYDROCHLORIDE 0.4 MG/1
0.4 CAPSULE ORAL DAILY
Qty: 5 CAPSULE | Refills: 0 | Status: SHIPPED | OUTPATIENT
Start: 2020-11-07 | End: 2021-01-30 | Stop reason: ALTCHOICE

## 2020-11-07 RX ORDER — KETOROLAC TROMETHAMINE 30 MG/ML
15 INJECTION, SOLUTION INTRAMUSCULAR; INTRAVENOUS ONCE
Status: COMPLETED | OUTPATIENT
Start: 2020-11-07 | End: 2020-11-07

## 2020-11-07 RX ADMIN — MORPHINE SULFATE 4 MG: 4 INJECTION INTRAVENOUS at 18:06

## 2020-11-07 RX ADMIN — OXYCODONE HYDROCHLORIDE AND ACETAMINOPHEN 1 TABLET: 5; 325 TABLET ORAL at 19:41

## 2020-11-07 RX ADMIN — KETOROLAC TROMETHAMINE 15 MG: 30 INJECTION, SOLUTION INTRAMUSCULAR at 17:38

## 2020-11-07 RX ADMIN — PROMETHAZINE HYDROCHLORIDE 12.5 MG: 25 INJECTION INTRAMUSCULAR; INTRAVENOUS at 18:47

## 2020-11-07 RX ADMIN — ONDANSETRON 4 MG: 2 INJECTION INTRAMUSCULAR; INTRAVENOUS at 17:40

## 2020-11-07 RX ADMIN — SODIUM CHLORIDE 1000 ML: 9 INJECTION, SOLUTION INTRAVENOUS at 17:39

## 2020-11-07 ASSESSMENT — PAIN DESCRIPTION - PAIN TYPE
TYPE: ACUTE PAIN
TYPE: ACUTE PAIN

## 2020-11-07 ASSESSMENT — PAIN DESCRIPTION - PROGRESSION: CLINICAL_PROGRESSION: GRADUALLY WORSENING

## 2020-11-07 ASSESSMENT — PAIN DESCRIPTION - LOCATION
LOCATION: ABDOMEN;FLANK
LOCATION: FLANK

## 2020-11-07 ASSESSMENT — ENCOUNTER SYMPTOMS
NAUSEA: 1
SHORTNESS OF BREATH: 0
ABDOMINAL PAIN: 1
VOMITING: 0
DIARRHEA: 0
CONSTIPATION: 0
EYE REDNESS: 0

## 2020-11-07 ASSESSMENT — PAIN DESCRIPTION - DIRECTION: RADIATING_TOWARDS: ABDOMEN AND GROIN

## 2020-11-07 ASSESSMENT — PAIN DESCRIPTION - FREQUENCY: FREQUENCY: CONTINUOUS

## 2020-11-07 ASSESSMENT — PAIN SCALES - GENERAL
PAINLEVEL_OUTOF10: 10
PAINLEVEL_OUTOF10: 7
PAINLEVEL_OUTOF10: 6
PAINLEVEL_OUTOF10: 9

## 2020-11-07 ASSESSMENT — PAIN DESCRIPTION - DESCRIPTORS: DESCRIPTORS: SHARP

## 2020-11-07 ASSESSMENT — PAIN DESCRIPTION - ORIENTATION: ORIENTATION: RIGHT

## 2020-11-07 NOTE — ED PROVIDER NOTES
surgery; Cholecystectomy; Colonoscopy; Endoscopy, colon, diagnostic; Hysterectomy; Tonsillectomy; Upper gastrointestinal endoscopy (N/A, 8/17/2018); Upper gastrointestinal endoscopy (N/A, 4/16/2019); Upper gastrointestinal endoscopy (N/A, 5/28/2019); Breast surgery (Left); Colonoscopy (8/30/2019); Colonoscopy (8/30/2019); Upper gastrointestinal endoscopy (N/A, 11/5/2019); and Upper gastrointestinal endoscopy (N/A, 12/13/2019). Her family history is not on file. She reports that she has never smoked. She has never used smokeless tobacco. She reports that she does not drink alcohol or use drugs. Medications     Previous Medications    AMITRIPTYLINE (ELAVIL) 100 MG TABLET    Take 100 mg by mouth nightly    CYCLOBENZAPRINE (FLEXERIL) 5 MG TABLET    Take 5 mg by mouth 3 times daily as needed for Muscle spasms    DULOXETINE HCL (CYMBALTA PO)    Take 90 mg by mouth daily    ONDANSETRON (ZOFRAN ODT) 4 MG DISINTEGRATING TABLET    Take 1 tablet by mouth every 8 hours as needed for Nausea    PANTOPRAZOLE (PROTONIX) 40 MG TABLET    2 times daily     PROMETHAZINE HCL (PHENERGAN PO)    Take by mouth       Allergies     She is allergic to rocephin [ceftriaxone]. Physical Exam     INITIAL VITALS: BP: (!) 147/76,Temp: 98 °F (36.7 °C), Pulse: 98, Resp: 20, SpO2: 100 %   Physical Exam  Vitals signs and nursing note reviewed. Constitutional:       General: She is not in acute distress. HENT:      Head: Normocephalic and atraumatic. Mouth/Throat:      Mouth: Mucous membranes are moist.      Pharynx: Oropharynx is clear. Eyes:      Extraocular Movements: Extraocular movements intact. Conjunctiva/sclera: Conjunctivae normal.   Neck:      Musculoskeletal: Neck supple. Cardiovascular:      Rate and Rhythm: Normal rate and regular rhythm. Pulmonary:      Effort: Pulmonary effort is normal. No respiratory distress. Breath sounds: Normal breath sounds. No wheezing, rhonchi or rales.    Abdominal:      General: Bowel sounds are normal. There is no distension. Palpations: Abdomen is soft. Tenderness: There is abdominal tenderness in the right lower quadrant and suprapubic area. There is right CVA tenderness. There is no guarding or rebound. Musculoskeletal:         General: No deformity. Skin:     General: Skin is warm and dry. Neurological:      Mental Status: She is alert and oriented to person, place, and time.    Psychiatric:         Mood and Affect: Mood normal.         Behavior: Behavior normal.         Diagnostic Results     RADIOLOGY:  No orders to display       LABS:   Results for orders placed or performed during the hospital encounter of 11/07/20   Urinalysis, reflex to microscopic   Result Value Ref Range    Color, UA Yellow Straw/Yellow    Clarity, UA Clear Clear    Glucose, Ur Negative Negative mg/dL    Bilirubin Urine Negative Negative    Ketones, Urine Negative Negative mg/dL    Specific Gravity, UA 1.025 1.005 - 1.030    Blood, Urine Negative Negative    pH, UA 6.0 5.0 - 8.0    Protein, UA Negative Negative mg/dL    Urobilinogen, Urine 0.2 <2.0 E.U./dL    Nitrite, Urine Negative Negative    Leukocyte Esterase, Urine Negative Negative    Microscopic Examination Not Indicated     Urine Type Other    CBC Auto Differential   Result Value Ref Range    WBC 9.3 4.0 - 11.0 K/uL    RBC 5.04 4.00 - 5.20 M/uL    Hemoglobin 14.4 12.0 - 16.0 g/dL    Hematocrit 43.7 36.0 - 48.0 %    MCV 86.8 80.0 - 100.0 fL    MCH 28.6 26.0 - 34.0 pg    MCHC 33.0 31.0 - 36.0 g/dL    RDW 14.2 12.4 - 15.4 %    Platelets 457 619 - 643 K/uL    MPV 7.9 5.0 - 10.5 fL    Neutrophils % 60.7 %    Lymphocytes % 28.5 %    Monocytes % 7.1 %    Eosinophils % 2.7 %    Basophils % 1.0 %    Neutrophils Absolute 5.6 1.7 - 7.7 K/uL    Lymphocytes Absolute 2.6 1.0 - 5.1 K/uL    Monocytes Absolute 0.7 0.0 - 1.3 K/uL    Eosinophils Absolute 0.2 0.0 - 0.6 K/uL    Basophils Absolute 0.1 0.0 - 0.2 K/uL   Basic Metabolic Panel w/ Reflex to MG Result Value Ref Range    Sodium 139 136 - 145 mmol/L    Potassium reflex Magnesium 4.9 3.5 - 5.1 mmol/L    Chloride 104 99 - 110 mmol/L    CO2 24 21 - 32 mmol/L    Anion Gap 11 3 - 16    Glucose 93 70 - 99 mg/dL    BUN 10 7 - 20 mg/dL    CREATININE 0.7 0.6 - 1.1 mg/dL    GFR Non-African American >60 >60    GFR African American >60 >60    Calcium 9.5 8.3 - 10.6 mg/dL   Pregnancy, Urine   Result Value Ref Range    HCG(Urine) Pregnancy Test Negative Detects HCG level >20 MIU/mL       RECENT VITALS:  BP: 136/77, Temp: 98 °F (36.7 °C), Pulse: 82,Resp: 18, SpO2: 96 %     Procedures         ED Course     Nursing Notes, Past Medical Hx, Past Surgical Hx, Social Hx, Allergies, and Family Hx were reviewed. The patient was given the followingmedications:  Orders Placed This Encounter   Medications    0.9 % sodium chloride bolus    ondansetron (ZOFRAN) injection 4 mg    ketorolac (TORADOL) injection 15 mg    morphine injection 4 mg    promethazine (PHENERGAN) injection 12.5 mg    oxyCODONE-acetaminophen (PERCOCET) 5-325 MG per tablet 1 tablet    oxyCODONE-acetaminophen (PERCOCET) 5-325 MG per tablet     Sig: Take 1 tablet by mouth every 6 hours as needed for Pain for up to 3 days. Intended supply: 3 days. Take lowest dose possible to manage pain     Dispense:  12 tablet     Refill:  0    tamsulosin (FLOMAX) 0.4 MG capsule     Sig: Take 1 capsule by mouth daily for 5 doses     Dispense:  5 capsule     Refill:  0    ondansetron (ZOFRAN ODT) 4 MG disintegrating tablet     Sig: Take 1 tablet by mouth every 8 hours as needed for Nausea     Dispense:  12 tablet     Refill:  0       CONSULTS:  None    MEDICAL DECISION MAKING / ASSESSMENT / Halie Allen is a 53 y.o. female with PMH of Fibromyalgia, Kidney stones, S/p Appendectomy/Cholecystectomy who presents with right flank pain. Patient reports right-sided flank pain with radiation to the groin since yesterday.   Associated nausea, urinary frequency and urgency. No fevers. No change in bowel movements. No vaginal complaints. On physical exam, she is well-appearing and hemodynamically stable. Heart rhythm regular. Lungs clear to auscultation. Abdomen is soft with tenderness in the suprapubic region and right lower quadrant. There is right CVA tenderness. Urinalysis negative. Urine hCG negative. IV access was established. 1 L bolus of normal saline, Toradol, Zofran ordered for patient's symptoms. Labs including CBC, BMP were obtained, without acute findings. Patient reports that symptoms are consistent with prior history of kidney stones. She has been able to pass multiple stones previously. She did have a CAT scan 1 month ago which revealed multiple stones in her right kidney. I discussed with the patient obtaining a repeat CT scan today to evaluate for obstructing stones. However after discussing potential risks of frequent scans, patient opted to forego scan today and attempt to treat symptoms supportively. Patient was ultimately given a dose of IV morphine and then 5 mg of oral oxycodone with improvement of her pain. She will be discharged with prescriptions for oxycodone, Flomax, Zofran for symptoms. She is encouraged to follow-up with her primary doctor. She is discharged home with strict return precautions. This patient was also evaluated by the attending physician. All care plans were discussed and agreed upon. Clinical Impression     1.  Right flank pain        Disposition     PATIENT REFERRED TO:  Quynh Gr MD  [facility]  [address] [place] [address]  [phone]    Schedule an appointment as soon as possible for a visit       [facility] Emergency Department  [address]  [phone]    If symptoms worsen      DISCHARGE MEDICATIONS:  New Prescriptions    ONDANSETRON (ZOFRAN ODT) 4 MG DISINTEGRATING TABLET    Take 1 tablet by mouth every 8 hours as needed for Nausea OXYCODONE-ACETAMINOPHEN (PERCOCET) 5-325 MG PER TABLET    Take 1 tablet by mouth every 6 hours as needed for Pain for up to 3 days. Intended supply: 3 days. Take lowest dose possible to manage pain    TAMSULOSIN (FLOMAX) 0.4 MG CAPSULE    Take 1 capsule by mouth daily for 5 doses        DISPOSITION  Discharge.        Nydia Carreno PA-C  11/07/20 [id number]

## 2020-11-07 NOTE — ED PROVIDER NOTES
ED Attending Attestation Note     Date of evaluation: 11/7/2020    This patient was seen by the advance practice provider. I have seen and examined the patient, agree with the workup, evaluation, management and diagnosis. The care plan has been discussed. My assessment reveals 32-year-old female with a history of nephrolithiasis and fibromyalgia who presents with right flank pain radiating to the groin. Patient states that has been constant with a onset yesterday, also complains of dysuria. Patient had a recent CT scan showing multiple right-sided kidney stones, states that she feels as though she is passing 1. Patient's laboratory work was reassuring including creatinine and white blood cell count, urinalysis without concern for infection or bleeding. On repeat evaluation patient continues to have right flank pain. Given patient's laboratory work is reassuring and her urinalysis is without concern for infection we will plan to continue IV hydration and pain control. If we can control patient's pain we will plan for discharge home with outpatient follow-up if we are unable to control patient's pain we will obtain CT scan of the abdomen pelvis without contrast to better assess. On repeat evaluation patient reports significantly improved symptoms and has successfully completed PO challenge. At this time I feel patient is safe for discharge home.      Manjit Longoria MD  11/07/20 2027

## 2020-11-07 NOTE — ED TRIAGE NOTES
Patient reports to the ED with right sided flank pain. Patient had a CT done October 1st and it showed 5 kidney stones in right kidney. States she feels like she is passing a stone but the pain is too bad.  Pain currently 10/10

## 2020-12-26 ENCOUNTER — APPOINTMENT (OUTPATIENT)
Dept: CT IMAGING | Age: 50
End: 2020-12-26
Payer: COMMERCIAL

## 2020-12-26 ENCOUNTER — HOSPITAL ENCOUNTER (EMERGENCY)
Age: 50
Discharge: HOME | End: 2020-12-26
Attending: STUDENT IN AN ORGANIZED HEALTH CARE EDUCATION/TRAINING PROGRAM
Payer: COMMERCIAL

## 2020-12-26 ENCOUNTER — APPOINTMENT (OUTPATIENT)
Dept: GENERAL RADIOLOGY | Age: 50
End: 2020-12-26
Payer: COMMERCIAL

## 2020-12-26 VITALS
DIASTOLIC BLOOD PRESSURE: 90 MMHG | OXYGEN SATURATION: 100 % | HEIGHT: 65 IN | BODY MASS INDEX: 39.99 KG/M2 | RESPIRATION RATE: 18 BRPM | HEART RATE: 105 BPM | WEIGHT: 240 LBS | SYSTOLIC BLOOD PRESSURE: 143 MMHG | TEMPERATURE: 98.9 F

## 2020-12-26 PROBLEM — U07.1 COVID-19: Status: ACTIVE | Noted: 2020-12-26

## 2020-12-26 LAB
ANION GAP SERPL CALCULATED.3IONS-SCNC: 11 MMOL/L (ref 3–16)
BASOPHILS ABSOLUTE: 0.1 K/UL (ref 0–0.2)
BASOPHILS RELATIVE PERCENT: 1.6 %
BUN BLDV-MCNC: 9 MG/DL (ref 7–20)
CALCIUM SERPL-MCNC: 9.3 MG/DL (ref 8.3–10.6)
CHLORIDE BLD-SCNC: 102 MMOL/L (ref 99–110)
CO2: 26 MMOL/L (ref 21–32)
CREAT SERPL-MCNC: 0.9 MG/DL (ref 0.6–1.1)
EOSINOPHILS ABSOLUTE: 0.5 K/UL (ref 0–0.6)
EOSINOPHILS RELATIVE PERCENT: 7.3 %
GFR AFRICAN AMERICAN: >60
GFR NON-AFRICAN AMERICAN: >60
GLUCOSE BLD-MCNC: 90 MG/DL (ref 70–99)
HCT VFR BLD CALC: 42.3 % (ref 36–48)
HEMOGLOBIN: 13.8 G/DL (ref 12–16)
LYMPHOCYTES ABSOLUTE: 1.9 K/UL (ref 1–5.1)
LYMPHOCYTES RELATIVE PERCENT: 25.7 %
MCH RBC QN AUTO: 28.4 PG (ref 26–34)
MCHC RBC AUTO-ENTMCNC: 32.7 G/DL (ref 31–36)
MCV RBC AUTO: 87 FL (ref 80–100)
MONOCYTES ABSOLUTE: 0.8 K/UL (ref 0–1.3)
MONOCYTES RELATIVE PERCENT: 10.3 %
NEUTROPHILS ABSOLUTE: 4.1 K/UL (ref 1.7–7.7)
NEUTROPHILS RELATIVE PERCENT: 55.1 %
PDW BLD-RTO: 15.1 % (ref 12.4–15.4)
PLATELET # BLD: 245 K/UL (ref 135–450)
PMV BLD AUTO: 8.1 FL (ref 5–10.5)
POTASSIUM REFLEX MAGNESIUM: 3.9 MMOL/L (ref 3.5–5.1)
PRO-BNP: <5 PG/ML (ref 0–124)
RBC # BLD: 4.86 M/UL (ref 4–5.2)
SODIUM BLD-SCNC: 139 MMOL/L (ref 136–145)
TROPONIN: <0.01 NG/ML
WBC # BLD: 7.4 K/UL (ref 4–11)

## 2020-12-26 PROCEDURE — 6360000004 HC RX CONTRAST MEDICATION: Performed by: STUDENT IN AN ORGANIZED HEALTH CARE EDUCATION/TRAINING PROGRAM

## 2020-12-26 PROCEDURE — 6370000000 HC RX 637 (ALT 250 FOR IP): Performed by: STUDENT IN AN ORGANIZED HEALTH CARE EDUCATION/TRAINING PROGRAM

## 2020-12-26 PROCEDURE — 80048 BASIC METABOLIC PNL TOTAL CA: CPT

## 2020-12-26 PROCEDURE — 93005 ELECTROCARDIOGRAM TRACING: CPT | Performed by: STUDENT IN AN ORGANIZED HEALTH CARE EDUCATION/TRAINING PROGRAM

## 2020-12-26 PROCEDURE — 84484 ASSAY OF TROPONIN QUANT: CPT

## 2020-12-26 PROCEDURE — 2580000003 HC RX 258: Performed by: STUDENT IN AN ORGANIZED HEALTH CARE EDUCATION/TRAINING PROGRAM

## 2020-12-26 PROCEDURE — 83880 ASSAY OF NATRIURETIC PEPTIDE: CPT

## 2020-12-26 PROCEDURE — 71260 CT THORAX DX C+: CPT

## 2020-12-26 PROCEDURE — 99284 EMERGENCY DEPT VISIT MOD MDM: CPT

## 2020-12-26 PROCEDURE — 71045 X-RAY EXAM CHEST 1 VIEW: CPT

## 2020-12-26 PROCEDURE — 85025 COMPLETE CBC W/AUTO DIFF WBC: CPT

## 2020-12-26 RX ORDER — DULOXETIN HYDROCHLORIDE 30 MG/1
90 CAPSULE, DELAYED RELEASE ORAL DAILY
Status: CANCELLED | OUTPATIENT
Start: 2020-12-26

## 2020-12-26 RX ORDER — IBUPROFEN 400 MG/1
400 TABLET ORAL EVERY 8 HOURS
Qty: 30 TABLET | Refills: 0 | Status: SHIPPED | OUTPATIENT
Start: 2020-12-26 | End: 2021-01-30 | Stop reason: ALTCHOICE

## 2020-12-26 RX ORDER — SODIUM CHLORIDE, SODIUM LACTATE, POTASSIUM CHLORIDE, AND CALCIUM CHLORIDE .6; .31; .03; .02 G/100ML; G/100ML; G/100ML; G/100ML
1000 INJECTION, SOLUTION INTRAVENOUS ONCE
Status: COMPLETED | OUTPATIENT
Start: 2020-12-26 | End: 2020-12-26

## 2020-12-26 RX ORDER — POLYETHYLENE GLYCOL 3350 17 G/17G
17 POWDER, FOR SOLUTION ORAL DAILY PRN
Status: CANCELLED | OUTPATIENT
Start: 2020-12-26

## 2020-12-26 RX ORDER — ONDANSETRON 2 MG/ML
4 INJECTION INTRAMUSCULAR; INTRAVENOUS EVERY 6 HOURS PRN
Status: CANCELLED | OUTPATIENT
Start: 2020-12-26

## 2020-12-26 RX ORDER — IBUPROFEN 400 MG/1
800 TABLET ORAL ONCE
Status: COMPLETED | OUTPATIENT
Start: 2020-12-26 | End: 2020-12-26

## 2020-12-26 RX ORDER — PROMETHAZINE HYDROCHLORIDE 25 MG/1
12.5 TABLET ORAL EVERY 6 HOURS PRN
Status: CANCELLED | OUTPATIENT
Start: 2020-12-26

## 2020-12-26 RX ORDER — SODIUM CHLORIDE 0.9 % (FLUSH) 0.9 %
10 SYRINGE (ML) INJECTION EVERY 12 HOURS SCHEDULED
Status: CANCELLED | OUTPATIENT
Start: 2020-12-26

## 2020-12-26 RX ORDER — FAMOTIDINE 20 MG/1
20 TABLET, FILM COATED ORAL 2 TIMES DAILY
Qty: 80 TABLET | Refills: 0 | Status: SHIPPED | OUTPATIENT
Start: 2020-12-26 | End: 2021-12-01 | Stop reason: CLARIF

## 2020-12-26 RX ORDER — ACETAMINOPHEN 500 MG
1000 TABLET ORAL ONCE
Status: COMPLETED | OUTPATIENT
Start: 2020-12-26 | End: 2020-12-26

## 2020-12-26 RX ORDER — AMITRIPTYLINE HYDROCHLORIDE 50 MG/1
100 TABLET, FILM COATED ORAL NIGHTLY
Status: CANCELLED | OUTPATIENT
Start: 2020-12-26

## 2020-12-26 RX ORDER — ACETAMINOPHEN 650 MG/1
650 SUPPOSITORY RECTAL EVERY 6 HOURS PRN
Status: CANCELLED | OUTPATIENT
Start: 2020-12-26

## 2020-12-26 RX ORDER — IBUPROFEN 200 MG
200 TABLET ORAL EVERY 8 HOURS
Qty: 30 TABLET | Refills: 0 | Status: SHIPPED | OUTPATIENT
Start: 2020-12-26 | End: 2021-01-30 | Stop reason: ALTCHOICE

## 2020-12-26 RX ORDER — CYCLOBENZAPRINE HCL 10 MG
5 TABLET ORAL 3 TIMES DAILY PRN
Status: CANCELLED | OUTPATIENT
Start: 2020-12-26

## 2020-12-26 RX ORDER — IBUPROFEN 600 MG/1
600 TABLET ORAL EVERY 8 HOURS
Qty: 30 TABLET | Refills: 0 | Status: SHIPPED | OUTPATIENT
Start: 2020-12-26 | End: 2021-01-30 | Stop reason: ALTCHOICE

## 2020-12-26 RX ORDER — SODIUM CHLORIDE 0.9 % (FLUSH) 0.9 %
10 SYRINGE (ML) INJECTION PRN
Status: CANCELLED | OUTPATIENT
Start: 2020-12-26

## 2020-12-26 RX ORDER — ACETAMINOPHEN 325 MG/1
650 TABLET ORAL EVERY 6 HOURS PRN
Status: CANCELLED | OUTPATIENT
Start: 2020-12-26

## 2020-12-26 RX ADMIN — IOPAMIDOL 80 ML: 755 INJECTION, SOLUTION INTRAVENOUS at 15:56

## 2020-12-26 RX ADMIN — SODIUM CHLORIDE, SODIUM LACTATE, POTASSIUM CHLORIDE, AND CALCIUM CHLORIDE 1000 ML: .6; .31; .03; .02 INJECTION, SOLUTION INTRAVENOUS at 16:51

## 2020-12-26 RX ADMIN — ACETAMINOPHEN 1000 MG: 500 TABLET, COATED ORAL at 16:33

## 2020-12-26 RX ADMIN — IBUPROFEN 800 MG: 400 TABLET, FILM COATED ORAL at 17:59

## 2020-12-26 ASSESSMENT — PAIN DESCRIPTION - FREQUENCY: FREQUENCY: CONTINUOUS

## 2020-12-26 ASSESSMENT — PAIN SCALES - GENERAL
PAINLEVEL_OUTOF10: 8

## 2020-12-26 ASSESSMENT — PAIN DESCRIPTION - DESCRIPTORS: DESCRIPTORS: SHARP

## 2020-12-26 ASSESSMENT — PAIN DESCRIPTION - LOCATION: LOCATION: CHEST

## 2020-12-26 NOTE — ED PROVIDER NOTES
ED Attending Attestation Note     Date of evaluation: 12/26/2020    This patient was seen by the resident. I have seen and examined the patient, agree with the workup, evaluation, management and diagnosis. The care plan has been discussed. I have reviewed the ECG and concur with the resident's interpretation. My assessment reveals 52 y/o F with hx of blood clots, fibromyalgia, and recent COVID diagnosis presents to the ED for chest pain and shortness of breath. Describes sharp pleuritic chest pain. Tachycardic 105 on arrival, afebrile, no acute distress. EKG sinus tachycardia with left axis deviation and poor R wave progression. CXR negative for acute process. Labs negative for gross abnormality. Troponin negative x1. BNP not elevated. CT PE study negative for PE but does show moderate size pericardial effusion and ground glass opacities. Resident performed bedside echo which does not show signs of tamponade. Stable for discharge and outpatient follow up. ED precautions for worsening symptoms.      Roslyn Goldberg, MD  12/26/20 7418
(ELAVIL) 100 MG TABLET    Take 100 mg by mouth nightly    CYCLOBENZAPRINE (FLEXERIL) 5 MG TABLET    Take 5 mg by mouth 3 times daily as needed for Muscle spasms    DULOXETINE HCL (CYMBALTA PO)    Take 90 mg by mouth daily    ONDANSETRON (ZOFRAN ODT) 4 MG DISINTEGRATING TABLET    Take 1 tablet by mouth every 8 hours as needed for Nausea    ONDANSETRON (ZOFRAN ODT) 4 MG DISINTEGRATING TABLET    Take 1 tablet by mouth every 8 hours as needed for Nausea    PANTOPRAZOLE (PROTONIX) 40 MG TABLET    2 times daily     PROMETHAZINE HCL (PHENERGAN PO)    Take by mouth    TAMSULOSIN (FLOMAX) 0.4 MG CAPSULE    Take 1 capsule by mouth daily for 5 doses       Allergies     She is allergic to rocephin [ceftriaxone]. Physical Exam     INITIAL VITALS: BP: (!) 143/90, Temp: 98.9 °F (37.2 °C), Pulse: 105, Resp: 18, SpO2: 100 %     General: Patient is a 53 y.o. female who is seated in NAD. HEENT: Head atraumatic. Pupils equal, round, and reactive to light. Sclera clear. Mucous membranes moist. Oropharynx clear. Neck:  Supple. No lymphadenopathy. Pulmonary:   Normal respiratory effort. Lungs clear to auscultation bilaterally, with no wheezes, rales or rhonchi. Cardiac:  Regular rate and rhythm. Normal S1, S2. No murmurs, rubs or gallops. Abdomen:  Soft. Non-tender. Non-distended. No rebound tenderness or guarding. Musculoskeletal:  No obvious deformities. No tenderness to palpation. Vascular:  Radial pulses 2+ bilaterally. Skin:  Warm, dry, well-perfused. No rash. Neuro: AAOx4. CN II-XII grossly intact. Normal gait. Sensation grossly intact. Strength grossly equal and symmetric. Extremities:  No peripheral edema. No unilateral leg edema, erythema, warmth or muscle belly tenderness.     Diagnostic Results     EKG   Interpreted in conjunction with emergency department physician Chelle Davis MD  Rhythm: sinus tachycardia  Rate: 102  Axis: left  Ectopy: none  Conduction: normal  ST Segments: normal  T

## 2020-12-26 NOTE — ED NOTES
Patient prepared for and ready to be discharged. Patient discharged at this time in no acute distress after verbalizing understanding of discharge instructions. Patient left after receiving After Visit Summary instructions.       Chloe Armijo RN  12/26/20 9514

## 2020-12-26 NOTE — Clinical Note
Patient Class: Inpatient [101]  REQUIRED: Diagnosis: [id number] [[id number]]  Estimated Length of Stay: Estimated stay of more than 2 midnights

## 2020-12-27 LAB
EKG ATRIAL RATE: 102 BPM
EKG DIAGNOSIS: NORMAL
EKG P AXIS: 13 DEGREES
EKG P-R INTERVAL: 112 MS
EKG Q-T INTERVAL: 346 MS
EKG QRS DURATION: 78 MS
EKG QTC CALCULATION (BAZETT): 450 MS
EKG R AXIS: -33 DEGREES
EKG T AXIS: 39 DEGREES
EKG VENTRICULAR RATE: 102 BPM

## 2020-12-29 ENCOUNTER — CARE COORDINATION (OUTPATIENT)
Dept: CARE COORDINATION | Age: 50
End: 2020-12-29

## 2021-01-13 ENCOUNTER — TELEPHONE (OUTPATIENT)
Dept: CASE MANAGEMENT | Age: 51
End: 2021-01-13

## 2021-01-13 NOTE — TELEPHONE ENCOUNTER
Pulmonary nodule noted on CT Chest 12/26/2020. Called PCP's office-left voicemail for callback.     Alissa WATERSN, RN  [facility]  Lung Nodule Navigator  [phone]

## 2021-01-26 ENCOUNTER — TELEPHONE (OUTPATIENT)
Dept: CASE MANAGEMENT | Age: 51
End: 2021-01-26

## 2021-01-30 ENCOUNTER — HOSPITAL ENCOUNTER (EMERGENCY)
Age: 51
Discharge: HOME | End: 2021-01-30
Attending: EMERGENCY MEDICINE
Payer: COMMERCIAL

## 2021-01-30 VITALS
DIASTOLIC BLOOD PRESSURE: 58 MMHG | BODY MASS INDEX: 43.34 KG/M2 | WEIGHT: 260.14 LBS | HEART RATE: 71 BPM | HEIGHT: 65 IN | SYSTOLIC BLOOD PRESSURE: 129 MMHG | TEMPERATURE: 98.1 F | RESPIRATION RATE: 16 BRPM | OXYGEN SATURATION: 96 %

## 2021-01-30 DIAGNOSIS — N23 RENAL COLIC: Primary | ICD-10-CM

## 2021-01-30 LAB
A/G RATIO: 1.5 (ref 1.1–2.2)
ALBUMIN SERPL-MCNC: 4.1 G/DL (ref 3.4–5)
ALP BLD-CCNC: 59 U/L (ref 40–129)
ALT SERPL-CCNC: 87 U/L (ref 10–40)
ANION GAP SERPL CALCULATED.3IONS-SCNC: 10 MMOL/L (ref 3–16)
AST SERPL-CCNC: 51 U/L (ref 15–37)
BASOPHILS ABSOLUTE: 0.1 K/UL (ref 0–0.2)
BASOPHILS RELATIVE PERCENT: 1.2 %
BILIRUB SERPL-MCNC: 0.5 MG/DL (ref 0–1)
BILIRUBIN URINE: NEGATIVE
BLOOD, URINE: ABNORMAL
BUN BLDV-MCNC: 10 MG/DL (ref 7–20)
CALCIUM SERPL-MCNC: 9.6 MG/DL (ref 8.3–10.6)
CHLORIDE BLD-SCNC: 105 MMOL/L (ref 99–110)
CLARITY: CLEAR
CO2: 24 MMOL/L (ref 21–32)
COLOR: YELLOW
CREAT SERPL-MCNC: 0.8 MG/DL (ref 0.6–1.1)
EOSINOPHILS ABSOLUTE: 1.2 K/UL (ref 0–0.6)
EOSINOPHILS RELATIVE PERCENT: 12.9 %
EPITHELIAL CELLS, UA: NORMAL /HPF (ref 0–5)
GFR AFRICAN AMERICAN: >60
GFR NON-AFRICAN AMERICAN: >60
GLOBULIN: 2.7 G/DL
GLUCOSE BLD-MCNC: 98 MG/DL (ref 70–99)
GLUCOSE URINE: NEGATIVE MG/DL
HCT VFR BLD CALC: 39.5 % (ref 36–48)
HEMOGLOBIN: 13.2 G/DL (ref 12–16)
KETONES, URINE: NEGATIVE MG/DL
LACTIC ACID: 1.4 MMOL/L (ref 0.4–2)
LEUKOCYTE ESTERASE, URINE: ABNORMAL
LYMPHOCYTES ABSOLUTE: 2.9 K/UL (ref 1–5.1)
LYMPHOCYTES RELATIVE PERCENT: 30.2 %
MCH RBC QN AUTO: 29.2 PG (ref 26–34)
MCHC RBC AUTO-ENTMCNC: 33.4 G/DL (ref 31–36)
MCV RBC AUTO: 87.4 FL (ref 80–100)
MICROSCOPIC EXAMINATION: YES
MONOCYTES ABSOLUTE: 0.7 K/UL (ref 0–1.3)
MONOCYTES RELATIVE PERCENT: 7.3 %
NEUTROPHILS ABSOLUTE: 4.7 K/UL (ref 1.7–7.7)
NEUTROPHILS RELATIVE PERCENT: 48.4 %
NITRITE, URINE: NEGATIVE
PDW BLD-RTO: 14.5 % (ref 12.4–15.4)
PH UA: 6 (ref 5–8)
PLATELET # BLD: 248 K/UL (ref 135–450)
PMV BLD AUTO: 8.3 FL (ref 5–10.5)
POTASSIUM REFLEX MAGNESIUM: 3.7 MMOL/L (ref 3.5–5.1)
PROTEIN UA: NEGATIVE MG/DL
RBC # BLD: 4.52 M/UL (ref 4–5.2)
RBC UA: NORMAL /HPF (ref 0–4)
SODIUM BLD-SCNC: 139 MMOL/L (ref 136–145)
SPECIFIC GRAVITY UA: 1.01 (ref 1–1.03)
TOTAL PROTEIN: 6.8 G/DL (ref 6.4–8.2)
URINE REFLEX TO CULTURE: ABNORMAL
URINE TYPE: ABNORMAL
UROBILINOGEN, URINE: 0.2 E.U./DL
WBC # BLD: 9.6 K/UL (ref 4–11)
WBC UA: NORMAL /HPF (ref 0–5)

## 2021-01-30 PROCEDURE — 80053 COMPREHEN METABOLIC PANEL: CPT

## 2021-01-30 PROCEDURE — 96374 THER/PROPH/DIAG INJ IV PUSH: CPT

## 2021-01-30 PROCEDURE — 81001 URINALYSIS AUTO W/SCOPE: CPT

## 2021-01-30 PROCEDURE — 83605 ASSAY OF LACTIC ACID: CPT

## 2021-01-30 PROCEDURE — 85025 COMPLETE CBC W/AUTO DIFF WBC: CPT

## 2021-01-30 PROCEDURE — 6370000000 HC RX 637 (ALT 250 FOR IP): Performed by: EMERGENCY MEDICINE

## 2021-01-30 PROCEDURE — 99284 EMERGENCY DEPT VISIT MOD MDM: CPT

## 2021-01-30 PROCEDURE — 96375 TX/PRO/DX INJ NEW DRUG ADDON: CPT

## 2021-01-30 PROCEDURE — 6360000002 HC RX W HCPCS: Performed by: EMERGENCY MEDICINE

## 2021-01-30 PROCEDURE — 2580000003 HC RX 258: Performed by: EMERGENCY MEDICINE

## 2021-01-30 RX ORDER — MORPHINE SULFATE 10 MG/ML
6 INJECTION, SOLUTION INTRAMUSCULAR; INTRAVENOUS ONCE
Status: COMPLETED | OUTPATIENT
Start: 2021-01-30 | End: 2021-01-30

## 2021-01-30 RX ORDER — ONDANSETRON 4 MG/1
4 TABLET, ORALLY DISINTEGRATING ORAL EVERY 8 HOURS PRN
Qty: 20 TABLET | Refills: 0 | Status: ON HOLD | OUTPATIENT
Start: 2021-01-30 | End: 2021-02-05

## 2021-01-30 RX ORDER — ONDANSETRON 2 MG/ML
4 INJECTION INTRAMUSCULAR; INTRAVENOUS ONCE
Status: COMPLETED | OUTPATIENT
Start: 2021-01-30 | End: 2021-01-30

## 2021-01-30 RX ORDER — 0.9 % SODIUM CHLORIDE 0.9 %
1000 INTRAVENOUS SOLUTION INTRAVENOUS ONCE
Status: COMPLETED | OUTPATIENT
Start: 2021-01-30 | End: 2021-01-30

## 2021-01-30 RX ORDER — TAMSULOSIN HYDROCHLORIDE 0.4 MG/1
0.4 CAPSULE ORAL ONCE
Status: COMPLETED | OUTPATIENT
Start: 2021-01-30 | End: 2021-01-30

## 2021-01-30 RX ORDER — SODIUM CHLORIDE 0.9 % (FLUSH) 0.9 %
10 SYRINGE (ML) INJECTION PRN
Status: DISCONTINUED | OUTPATIENT
Start: 2021-01-30 | End: 2021-01-30 | Stop reason: HOSPADM

## 2021-01-30 RX ORDER — KETOROLAC TROMETHAMINE 30 MG/ML
30 INJECTION, SOLUTION INTRAMUSCULAR; INTRAVENOUS ONCE
Status: COMPLETED | OUTPATIENT
Start: 2021-01-30 | End: 2021-01-30

## 2021-01-30 RX ORDER — METOCLOPRAMIDE HYDROCHLORIDE 5 MG/ML
10 INJECTION INTRAMUSCULAR; INTRAVENOUS ONCE
Status: COMPLETED | OUTPATIENT
Start: 2021-01-30 | End: 2021-01-30

## 2021-01-30 RX ORDER — OXYCODONE HYDROCHLORIDE AND ACETAMINOPHEN 5; 325 MG/1; MG/1
1 TABLET ORAL EVERY 6 HOURS PRN
Qty: 12 TABLET | Refills: 0 | Status: SHIPPED | OUTPATIENT
Start: 2021-01-30 | End: 2021-02-02

## 2021-01-30 RX ADMIN — KETOROLAC TROMETHAMINE 30 MG: 30 INJECTION, SOLUTION INTRAMUSCULAR at 09:19

## 2021-01-30 RX ADMIN — TAMSULOSIN HYDROCHLORIDE 0.4 MG: 0.4 CAPSULE ORAL at 10:19

## 2021-01-30 RX ADMIN — METOCLOPRAMIDE 10 MG: 5 INJECTION, SOLUTION INTRAMUSCULAR; INTRAVENOUS at 10:20

## 2021-01-30 RX ADMIN — HYDROMORPHONE HYDROCHLORIDE 1 MG: 1 INJECTION, SOLUTION INTRAMUSCULAR; INTRAVENOUS; SUBCUTANEOUS at 10:21

## 2021-01-30 RX ADMIN — ONDANSETRON 4 MG: 2 INJECTION INTRAMUSCULAR; INTRAVENOUS at 09:18

## 2021-01-30 RX ADMIN — Medication 10 ML: at 10:21

## 2021-01-30 RX ADMIN — SODIUM CHLORIDE 1000 ML: 9 INJECTION, SOLUTION INTRAVENOUS at 09:16

## 2021-01-30 RX ADMIN — Medication 10 ML: at 09:21

## 2021-01-30 RX ADMIN — MORPHINE SULFATE 6 MG: 10 INJECTION INTRAVENOUS at 09:20

## 2021-01-30 ASSESSMENT — ENCOUNTER SYMPTOMS
NAUSEA: 1
SORE THROAT: 0
SHORTNESS OF BREATH: 0
ALLERGIC/IMMUNOLOGIC NEGATIVE: 1
BACK PAIN: 1

## 2021-01-30 ASSESSMENT — PAIN SCALES - GENERAL
PAINLEVEL_OUTOF10: 7
PAINLEVEL_OUTOF10: 10
PAINLEVEL_OUTOF10: 10
PAINLEVEL_OUTOF10: 8

## 2021-01-30 NOTE — ED TRIAGE NOTES
Onset yesterday with bilateral low back pain, worse on right, with worsening today.  Also states having dysuria and lower abdominal pain

## 2021-01-30 NOTE — ED NOTES
Bed: A05-05  Expected date:   Expected time:   Means of arrival:   Comments:  Michael Sanchez RN  01/30/21 [id number]

## 2021-01-30 NOTE — ED PROVIDER NOTES
and PONV (postoperative nausea and vomiting). She has a past surgical history that includes other surgical history (10/3/14); Appendectomy; Abdomen surgery; Cholecystectomy; Colonoscopy; Endoscopy, colon, diagnostic; Hysterectomy; Tonsillectomy; Upper gastrointestinal endoscopy (N/A, 8/17/2018); Upper gastrointestinal endoscopy (N/A, 4/16/2019); Upper gastrointestinal endoscopy (N/A, 5/28/2019); Breast surgery (Left); Colonoscopy (8/30/2019); Colonoscopy (8/30/2019); Upper gastrointestinal endoscopy (N/A, 11/5/2019); and Upper gastrointestinal endoscopy (N/A, 12/13/2019). Her family history is not on file. She reports that she has never smoked. She has never used smokeless tobacco. She reports that she does not drink alcohol or use drugs. Medications     Previous Medications    AMITRIPTYLINE (ELAVIL) 100 MG TABLET    Take 100 mg by mouth nightly as needed     CYCLOBENZAPRINE (FLEXERIL) 5 MG TABLET    Take 5 mg by mouth 3 times daily as needed for Muscle spasms    DULOXETINE HCL (CYMBALTA PO)    Take 90 mg by mouth daily    FAMOTIDINE (PEPCID) 20 MG TABLET    Take 1 tablet by mouth 2 times daily    ONDANSETRON (ZOFRAN ODT) 4 MG DISINTEGRATING TABLET    Take 1 tablet by mouth every 8 hours as needed for Nausea    PANTOPRAZOLE (PROTONIX) 40 MG TABLET    2 times daily        Allergies     She is allergic to rocephin [ceftriaxone]. Physical Exam     INITIAL VITALS: BP: (!) 162/88, Temp: 98.1 °F (36.7 °C), Pulse: 97, Resp: 20, SpO2: 99 %   Physical Exam  Vitals signs and nursing note reviewed. Constitutional:       General: She is in acute distress. Appearance: She is obese. She is not ill-appearing or toxic-appearing. HENT:      Head: Normocephalic. Mouth/Throat:      Mouth: Mucous membranes are moist.      Pharynx: No oropharyngeal exudate. Eyes:      Extraocular Movements: Extraocular movements intact. Pupils: Pupils are equal, round, and reactive to light.    Neck:      Musculoskeletal: Normal range of motion and neck supple. Cardiovascular:      Rate and Rhythm: Normal rate and regular rhythm. Pulses: Normal pulses. Heart sounds: Normal heart sounds. Pulmonary:      Effort: Pulmonary effort is normal.   Abdominal:      General: Abdomen is flat. Palpations: Abdomen is soft. Tenderness: There is abdominal tenderness. There is right CVA tenderness. There is no left CVA tenderness, guarding or rebound. Musculoskeletal: Normal range of motion. Skin:     General: Skin is warm. Capillary Refill: Capillary refill takes less than 2 seconds. Coloration: Skin is not jaundiced. Neurological:      General: No focal deficit present. Mental Status: She is alert. Mental status is at baseline.    Psychiatric:         Mood and Affect: Mood normal.         Diagnostic Results     EKG   None    RADIOLOGY:  No orders to display       LABS:   Results for orders placed or performed during the hospital encounter of 01/30/21   CBC Auto Differential   Result Value Ref Range    WBC 9.6 4.0 - 11.0 K/uL    RBC 4.52 4.00 - 5.20 M/uL    Hemoglobin 13.2 12.0 - 16.0 g/dL    Hematocrit 39.5 36.0 - 48.0 %    MCV 87.4 80.0 - 100.0 fL    MCH 29.2 26.0 - 34.0 pg    MCHC 33.4 31.0 - 36.0 g/dL    RDW 14.5 12.4 - 15.4 %    Platelets 937 142 - 686 K/uL    MPV 8.3 5.0 - 10.5 fL    Neutrophils % 48.4 %    Lymphocytes % 30.2 %    Monocytes % 7.3 %    Eosinophils % 12.9 %    Basophils % 1.2 %    Neutrophils Absolute 4.7 1.7 - 7.7 K/uL    Lymphocytes Absolute 2.9 1.0 - 5.1 K/uL    Monocytes Absolute 0.7 0.0 - 1.3 K/uL    Eosinophils Absolute 1.2 (H) 0.0 - 0.6 K/uL    Basophils Absolute 0.1 0.0 - 0.2 K/uL   Comprehensive Metabolic Panel w/ Reflex to MG   Result Value Ref Range    Sodium 139 136 - 145 mmol/L    Potassium reflex Magnesium 3.7 3.5 - 5.1 mmol/L    Chloride 105 99 - 110 mmol/L    CO2 24 21 - 32 mmol/L    Anion Gap 10 3 - 16    Glucose 98 70 - 99 mg/dL    BUN 10 7 - 20 mg/dL    CREATININE 0.8 0.6 - 1.1 mg/dL    GFR Non-African American >60 >60    GFR African American >60 >60    Calcium 9.6 8.3 - 10.6 mg/dL    Total Protein 6.8 6.4 - 8.2 g/dL    Albumin 4.1 3.4 - 5.0 g/dL    Albumin/Globulin Ratio 1.5 1.1 - 2.2    Total Bilirubin 0.5 0.0 - 1.0 mg/dL    Alkaline Phosphatase 59 40 - 129 U/L    ALT 87 (H) 10 - 40 U/L    AST 51 (H) 15 - 37 U/L    Globulin 2.7 g/dL   Lactic Acid, Plasma   Result Value Ref Range    Lactic Acid 1.4 0.4 - 2.0 mmol/L   Urinalysis Reflex to Culture    Specimen: Urine, clean catch   Result Value Ref Range    Color, UA Yellow Straw/Yellow    Clarity, UA Clear Clear    Glucose, Ur Negative Negative mg/dL    Bilirubin Urine Negative Negative    Ketones, Urine Negative Negative mg/dL    Specific Gravity, UA 1.015 1.005 - 1.030    Blood, Urine MODERATE (A) Negative    pH, UA 6.0 5.0 - 8.0    Protein, UA Negative Negative mg/dL    Urobilinogen, Urine 0.2 <2.0 E.U./dL    Nitrite, Urine Negative Negative    Leukocyte Esterase, Urine MODERATE (A) Negative    Microscopic Examination YES     Urine Type NotGiven     Urine Reflex to Culture Not Indicated    Microscopic Urinalysis   Result Value Ref Range    WBC, UA 3-5 0 - 5 /HPF    RBC, UA None seen 0 - 4 /HPF    Epithelial Cells, UA 2-5 0 - 5 /HPF       ED BEDSIDE ULTRASOUND:  RENAL ULTRASOUND:  A limited, bedside ultrasound of the kidneys was performed. The medical necessity was to evaluate for hydronephrosis in a patient with possible renal colic. The structures studied were the kidney, including the renal cortex and collecting system. FINDINGS:    negative for hydronephrosis. The study was technically adequate although limited due to body habitus. Leopoldo Fermo, MD  1/30/2021 11:24 AM            RECENT VITALS:  BP: 132/60,Temp: 98.1 °F (36.7 °C), Pulse: 80, Resp: 16, SpO2: 98 %     Procedures     None     ED Course     Nursing Notes, Past Medical Hx, Past Surgical Hx, Social Hx,Allergies, and Family Hx were reviewed.     patient was given the following medications:  Orders Placed This Encounter   Medications    sodium chloride flush 0.9 % injection 10 mL    0.9 % sodium chloride bolus    ketorolac (TORADOL) injection 30 mg    morphine injection 6 mg    ondansetron (ZOFRAN) injection 4 mg    tamsulosin (FLOMAX) capsule 0.4 mg    HYDROmorphone (DILAUDID) injection 1 mg    metoclopramide (REGLAN) injection 10 mg    oxyCODONE-acetaminophen (PERCOCET) 5-325 MG per tablet     Sig: Take 1 tablet by mouth every 6 hours as needed for Pain for up to 3 days. Intended supply: 3 days. Take lowest dose possible to manage pain     Dispense:  12 tablet     Refill:  0    ondansetron (ZOFRAN ODT) 4 MG disintegrating tablet     Sig: Take 1 tablet by mouth every 8 hours as needed for Nausea     Dispense:  20 tablet     Refill:  0         CONSULTS:  None    ED Course:  ED Course as of Jan 30 1124   Sat Jan 30, 2021   1006 Patient's labs reviewed, reassuring with no leukocytosis, normal kidney function. Lactate 1.4. She does have some blood and leukocytes in her urine. She states that she still having pain, rates as an 8/10. Will remedicate with hydromorphone. Patient had a safe ride home.    [SM]   [phone] Patient reassessed, states that her pain is more controlled and is comfortable discharge home. Patient does have a safe ride home with her .    [SM]      ED Course User Index  [SM] Erika Frances MD       MEDICAL DECISIONMAKING / Ruth Evans / [place] Alexandra is a 53 y.o. female the past medical history of fibromyalgia and recurrent kidney stones that presents today with right-sided flank pain and increased urination    Differential diagnosis to include: Infected kidney stone, nephrolithiasis, bowel obstruction, urinary tract infection, gastritis/enteritis    Plan: Labs, urinalysis, bedside ultrasound, IV fluid, IV Toradol, IV Zofran, Flomax, reassess. Medical Decision Making:  This is a 43-year-old female with a past medical history of fibromyalgia and kidney stones who presents today for right-sided flank pain. Patient was afebrile, not tachycardic, and had no episodes of hypotension while in the emergency department. Labs are reassuring with no leukocytosis, no infection in the urine, and normal lactate. Given that she patient has multiple CAT scans in the past with known history of kidney stones, we opted to do a bedside ultrasound that did not show hydronephrosis. Patient's pain was improved after dose of hydromorphone and ketorolac and she is able to tolerate p.o. She is comfortable with discharge home, told to follow with a urologist or to return back for new or worsening symptoms. We will do a short course of Percocet, narcotic warnings were given. Clinical Impression     1. Renal colic        Disposition     PATIENT REFERRED TO:  SUMI Mckeon - JACQUELIN  [address] [facility]          MD Martin Ramon 1964  [facility]  [facility] [id number] [phone]            DISCHARGE MEDICATIONS:  New Prescriptions    ONDANSETRON (ZOFRAN ODT) 4 MG DISINTEGRATING TABLET    Take 1 tablet by mouth every 8 hours as needed for Nausea    OXYCODONE-ACETAMINOPHEN (PERCOCET) 5-325 MG PER TABLET    Take 1 tablet by mouth every 6 hours as needed for Pain for up to 3 days. Intended supply: 3 days.  Take lowest dose possible to manage pain       DISPOSITION Decision To Discharge 01/30/2021 11:20:49 AM           Tasia Middleton MD  01/30/21 7927

## 2021-02-01 ENCOUNTER — NURSE ONLY (OUTPATIENT)
Dept: PRIMARY CARE CLINIC | Age: 51
End: 2021-02-01
Payer: COMMERCIAL

## 2021-02-01 DIAGNOSIS — Z01.818 PREOP EXAMINATION: Primary | ICD-10-CM

## 2021-02-01 LAB — SARS-COV-2: NOT DETECTED

## 2021-02-01 PROCEDURE — 99211 OFF/OP EST MAY X REQ PHY/QHP: CPT | Performed by: NURSE PRACTITIONER

## 2021-02-04 ENCOUNTER — ANESTHESIA EVENT (OUTPATIENT)
Dept: ENDOSCOPY | Age: 51
End: 2021-02-04
Payer: COMMERCIAL

## 2021-02-05 ENCOUNTER — HOSPITAL ENCOUNTER (OUTPATIENT)
Age: 51
Setting detail: OUTPATIENT SURGERY
Discharge: HOME | End: 2021-02-05
Attending: INTERNAL MEDICINE | Admitting: INTERNAL MEDICINE
Payer: COMMERCIAL

## 2021-02-05 ENCOUNTER — ANESTHESIA (OUTPATIENT)
Dept: ENDOSCOPY | Age: 51
End: 2021-02-05
Payer: COMMERCIAL

## 2021-02-05 VITALS — DIASTOLIC BLOOD PRESSURE: 77 MMHG | SYSTOLIC BLOOD PRESSURE: 110 MMHG | OXYGEN SATURATION: 100 %

## 2021-02-05 VITALS
DIASTOLIC BLOOD PRESSURE: 60 MMHG | TEMPERATURE: 97.4 F | RESPIRATION RATE: 17 BRPM | WEIGHT: 230 LBS | BODY MASS INDEX: 38.32 KG/M2 | HEART RATE: 79 BPM | OXYGEN SATURATION: 97 % | SYSTOLIC BLOOD PRESSURE: 118 MMHG | HEIGHT: 65 IN

## 2021-02-05 DIAGNOSIS — R10.9 ABDOMINAL PAIN, UNSPECIFIED ABDOMINAL LOCATION: ICD-10-CM

## 2021-02-05 DIAGNOSIS — R13.10 DYSPHAGIA, UNSPECIFIED TYPE: ICD-10-CM

## 2021-02-05 DIAGNOSIS — R19.7 DIARRHEA, UNSPECIFIED TYPE: ICD-10-CM

## 2021-02-05 PROCEDURE — 3700000000 HC ANESTHESIA ATTENDED CARE: Performed by: INTERNAL MEDICINE

## 2021-02-05 PROCEDURE — 3609010600 HC COLONOSCOPY POLYPECTOMY SNARE/COLD BIOPSY: Performed by: INTERNAL MEDICINE

## 2021-02-05 PROCEDURE — 2580000003 HC RX 258: Performed by: ANESTHESIOLOGY

## 2021-02-05 PROCEDURE — 2709999900 HC NON-CHARGEABLE SUPPLY: Performed by: INTERNAL MEDICINE

## 2021-02-05 PROCEDURE — 88305 TISSUE EXAM BY PATHOLOGIST: CPT

## 2021-02-05 PROCEDURE — 7100000010 HC PHASE II RECOVERY - FIRST 15 MIN: Performed by: INTERNAL MEDICINE

## 2021-02-05 PROCEDURE — 3609012400 HC EGD TRANSORAL BIOPSY SINGLE/MULTIPLE: Performed by: INTERNAL MEDICINE

## 2021-02-05 PROCEDURE — 6360000002 HC RX W HCPCS: Performed by: NURSE ANESTHETIST, CERTIFIED REGISTERED

## 2021-02-05 PROCEDURE — 6360000002 HC RX W HCPCS: Performed by: ANESTHESIOLOGY

## 2021-02-05 PROCEDURE — 7100000011 HC PHASE II RECOVERY - ADDTL 15 MIN: Performed by: INTERNAL MEDICINE

## 2021-02-05 PROCEDURE — 88342 IMHCHEM/IMCYTCHM 1ST ANTB: CPT

## 2021-02-05 PROCEDURE — 3700000001 HC ADD 15 MINUTES (ANESTHESIA): Performed by: INTERNAL MEDICINE

## 2021-02-05 RX ORDER — PROMETHAZINE HYDROCHLORIDE 25 MG/ML
6.25 INJECTION, SOLUTION INTRAMUSCULAR; INTRAVENOUS ONCE
Status: COMPLETED | OUTPATIENT
Start: 2021-02-05 | End: 2021-02-05

## 2021-02-05 RX ORDER — LIDOCAINE HYDROCHLORIDE 20 MG/ML
INJECTION, SOLUTION INTRAVENOUS PRN
Status: DISCONTINUED | OUTPATIENT
Start: 2021-02-05 | End: 2021-02-05 | Stop reason: SDUPTHER

## 2021-02-05 RX ORDER — ONDANSETRON 2 MG/ML
4 INJECTION INTRAMUSCULAR; INTRAVENOUS
Status: DISCONTINUED | OUTPATIENT
Start: 2021-02-05 | End: 2021-02-05 | Stop reason: HOSPADM

## 2021-02-05 RX ORDER — SODIUM CHLORIDE, SODIUM LACTATE, POTASSIUM CHLORIDE, CALCIUM CHLORIDE 600; 310; 30; 20 MG/100ML; MG/100ML; MG/100ML; MG/100ML
INJECTION, SOLUTION INTRAVENOUS CONTINUOUS
Status: DISCONTINUED | OUTPATIENT
Start: 2021-02-05 | End: 2021-02-05 | Stop reason: HOSPADM

## 2021-02-05 RX ORDER — PROPOFOL 10 MG/ML
INJECTION, EMULSION INTRAVENOUS PRN
Status: DISCONTINUED | OUTPATIENT
Start: 2021-02-05 | End: 2021-02-05 | Stop reason: SDUPTHER

## 2021-02-05 RX ORDER — PROPOFOL 10 MG/ML
INJECTION, EMULSION INTRAVENOUS CONTINUOUS PRN
Status: DISCONTINUED | OUTPATIENT
Start: 2021-02-05 | End: 2021-02-05 | Stop reason: SDUPTHER

## 2021-02-05 RX ORDER — PROMETHAZINE HYDROCHLORIDE 25 MG/ML
6.25 INJECTION, SOLUTION INTRAMUSCULAR; INTRAVENOUS
Status: DISCONTINUED | OUTPATIENT
Start: 2021-02-05 | End: 2021-02-05 | Stop reason: HOSPADM

## 2021-02-05 RX ADMIN — LIDOCAINE HYDROCHLORIDE 85 MG: 20 INJECTION, SOLUTION INTRAVENOUS at 12:26

## 2021-02-05 RX ADMIN — PROPOFOL 125 MCG/KG/MIN: 10 INJECTION, EMULSION INTRAVENOUS at 12:26

## 2021-02-05 RX ADMIN — PROPOFOL 125 MG: 10 INJECTION, EMULSION INTRAVENOUS at 12:26

## 2021-02-05 RX ADMIN — SODIUM CHLORIDE, POTASSIUM CHLORIDE, SODIUM LACTATE AND CALCIUM CHLORIDE: 600; 310; 30; 20 INJECTION, SOLUTION INTRAVENOUS at 10:47

## 2021-02-05 RX ADMIN — PROMETHAZINE HYDROCHLORIDE 6.25 MG: 25 INJECTION INTRAMUSCULAR; INTRAVENOUS at 13:47

## 2021-02-05 ASSESSMENT — PULMONARY FUNCTION TESTS
PIF_VALUE: 1

## 2021-02-05 ASSESSMENT — PAIN SCALES - WONG BAKER: WONGBAKER_NUMERICALRESPONSE: 0

## 2021-02-05 ASSESSMENT — PAIN SCALES - GENERAL: PAINLEVEL_OUTOF10: 0

## 2021-02-05 ASSESSMENT — PAIN - FUNCTIONAL ASSESSMENT: PAIN_FUNCTIONAL_ASSESSMENT: 0-10

## 2021-02-05 NOTE — ANESTHESIA PRE PROCEDURE
Department of Anesthesiology  Preprocedure Note       Name:  Igor Elizabeth   Age:  49 y.o.  :  1970                                          MRN:  [id number]         Date:  2021      Surgeon: Kunal Osborn):  Nidhi Sheriff MD    Procedure: Procedure(s):  ESOPHAGOGASTRODUODENOSCOPY  COLONOSCOPY    Medications prior to admission:   Prior to Admission medications    Medication Sig Start Date End Date Taking? Authorizing Provider   famotidine (PEPCID) 20 MG tablet Take 1 tablet by mouth 2 times daily 20 Yes Hali Scott MD   ondansetron (ZOFRAN ODT) 4 MG disintegrating tablet Take 1 tablet by mouth every 8 hours as needed for Nausea 20  Yes Roman Vazquez PA-C   pantoprazole (PROTONIX) 40 MG tablet 2 times daily  19  Yes Historical Provider, MD   DULoxetine HCl (CYMBALTA PO) Take 90 mg by mouth daily   Yes Historical Provider, MD   cyclobenzaprine (FLEXERIL) 5 MG tablet Take 5 mg by mouth 3 times daily as needed for Muscle spasms   Yes Historical Provider, MD   amitriptyline (ELAVIL) 100 MG tablet Take 100 mg by mouth nightly as needed    Yes Historical Provider, MD       Current medications:    Current Facility-Administered Medications   Medication Dose Route Frequency Provider Last Rate Last Admin    lactated ringers infusion   Intravenous Continuous Felice Huynh  mL/hr at 21 1047 New Bag at 21 1047       Allergies:     Allergies   Allergen Reactions    Rocephin [Ceftriaxone] Rash       Problem List:    Patient Active Problem List   Diagnosis Code    Sepsis ([facility] [address] (WBC 19.8K, ) due to pyelonephritis A41.9    Hiatal hernia with GERD and esophagitis/Villela's K44.9, K21.00    Recurrent nephrolithiasis N20.0    COVID-19 U07.1       Past Medical History:        Diagnosis Date    Villela's esophagus     Fibromyalgia     Hx of blood clots     has 3 blood clotting factors  liden factor 5, MTHFR, ProteinS    Kidney stone     PONV (postoperative nausea and vomiting)     AND ITCHING       Past Surgical History:        Procedure Laterality Date    ABDOMEN SURGERY      cholecystectomy, 3 c sections, tubaligations, hysterectomy    APPENDECTOMY      BREAST SURGERY Left     benign 3 times- benign cysts, Nov 2016 benign lumpectomy    CHOLECYSTECTOMY      COLONOSCOPY      COLONOSCOPY  08/30/2019    COLONOSCOPY WITH BIOPSY performed by Cole Calhoun MD at [address]  08/30/2019    COLONOSCOPY POLYPECTOMY HOT BIOPSY performed by Cole Calhoun MD at [address] [facility], [facility] OTHER SURGICAL HISTORY  10/03/2014    Push Enteroscopy with small bowel biopsy    TONSILLECTOMY      UPPER GASTROINTESTINAL ENDOSCOPY N/A 08/17/2018    EGD WITH ESOPHAGEAL  BIOPSY FOR COATES'S AND GASTRIC BIOPSY FOR H PYLORI performed by Cole Calhoun MD at [address] [place] [address] N/A 04/16/2019    EGD BIOPSY performed by Cole Calhoun MD at [address] [place] [address] N/A 05/28/2019    EGD BIOPSY performed by Cole Calhoun MD at [address] [place] [address] 11/05/2019    EGD BIOPSY performed by Cole Calhoun MD at [address] [place] [address] N/A 12/13/2019    EGD BIOPSY performed by Cole Calhoun MD at [address] History:    Social History     Tobacco Use    Smoking status: Never Smoker    Smokeless tobacco: Never Used   Substance Use Topics    Alcohol use:  No                                Counseling given: Not Answered      Vital Signs (Current):   Vitals:    02/05/21 1008 02/05/21 1049   BP: (!) 143/80    Pulse: 117 90   Resp: 18    Temp: 98.5 °F (36.9 °C)    TempSrc: Temporal    SpO2: 98%    Weight: 230 lb (104.3 kg)    Height: 5' 5\" (1.651 m)                                               BP Readings from Last 3 Encounters:   02/05/21 (!) 143/80   01/30/21 (!) 129/58   12/26/20 (!) 143/90       NPO Status: Time of last liquid consumption: 1930                        Time of last solid consumption: 2000                        Date of last liquid consumption: 02/04/21                        Date of last solid food consumption: 02/03/21    BMI:   Wt Readings from Last 3 Encounters:   02/05/21 230 lb (104.3 kg)   01/30/21 260 lb 2.3 oz (118 kg)   12/26/20 240 lb (108.9 kg)     Body mass index is 38.27 kg/m². CBC:   Lab Results   Component Value Date    WBC 9.6 01/30/2021    RBC 4.52 01/30/2021    HGB 13.2 01/30/2021    HCT 39.5 01/30/2021    MCV 87.4 01/30/2021    RDW 14.5 01/30/2021     01/30/2021       CMP:   Lab Results   Component Value Date     01/30/2021    K 3.7 01/30/2021     01/30/2021    CO2 24 01/30/2021    BUN 10 01/30/2021    CREATININE 0.8 01/30/2021    GFRAA >60 01/30/2021    AGRATIO 1.5 01/30/2021    LABGLOM >60 01/30/2021    GLUCOSE 98 01/30/2021    PROT 6.8 01/30/2021    CALCIUM 9.6 01/30/2021    BILITOT 0.5 01/30/2021    ALKPHOS 59 01/30/2021    AST 51 01/30/2021    ALT 87 01/30/2021       POC Tests: No results for input(s): POCGLU, POCNA, POCK, POCCL, POCBUN, POCHEMO, POCHCT in the last 72 hours. Coags: No results found for: PROTIME, INR, APTT    HCG (If Applicable):   Lab Results   Component Value Date    PREGTESTUR Negative 11/07/2020        ABGs: No results found for: PHART, PO2ART, HJH9NMJ, KMI6APA, BEART, L3QQAJLS     Type & Screen (If Applicable):  No results found for: LABABO, LABRH    Drug/Infectious Status (If Applicable):  No results found for: HIV, HEPCAB    COVID-19 Screening (If Applicable):   Lab Results   Component Value Date    COVID19 Not Detected 02/01/2021         Anesthesia Evaluation  Patient summary reviewed and Nursing notes reviewed   history of anesthetic complications: PONV.   Airway: Mallampati: III  TM distance: >3 FB   Neck ROM: full  Mouth opening: > = 3 FB Dental: normal exam         Pulmonary:Negative Pulmonary ROS and normal exam              Patient did not smoke on day of surgery. Cardiovascular:Negative CV ROS          ECG reviewed  Rhythm: regular  Rate: normal           Beta Blocker:  Not on Beta Blocker         Neuro/Psych:   (+) neuromuscular disease:,             GI/Hepatic/Renal:   (+) GERD: well controlled,           Endo/Other: Negative Endo/Other ROS                    Abdominal:       Abdomen: soft. Vascular: negative vascular ROS. Anesthesia Plan      MAC     ASA 2       Induction: intravenous. MIPS: Postoperative opioids intended and Prophylactic antiemetics administered. Anesthetic plan and risks discussed with patient. Use of blood products discussed with patient whom consented to blood products. Plan discussed with attending and CRNA.     Attending anesthesiologist reviewed and agrees with Pre Eval content              Kathryn Barrera DO   2/5/2021

## 2021-02-05 NOTE — PROGRESS NOTES
Ambulatory Surgery/Procedure Discharge Note    Vitals:    02/05/21 1345   BP: 118/60   Pulse: 79   Resp: 17   Temp: 97.4 °F (36.3 °C)   SpO2: 97%       In: 700 [I.V.:700]  Out: -     Restroom use offered before discharge. Yes  Pt tolerated toileting and up adlib. Denies getting dizzy and lightheaded. Nausea is resolved with Phenergan 6.25 mg IVP given at least 10 min. Protonix ordered to take BID until Dr. Maryse Hughes talks to the pt for follow up in 2 weeks. Pain assessment:  none  Pain Level: 0        Patient discharged to home/self care.  Patient discharged via wheel chair by this nurse to waiting family/S.O.       2/5/2021 2:24 PM
Last colonoscopy approximately 2 years. Hx of colon polyps. Family hx of colon cancer includes maternal grandfather.
Procedure scheduled 2/5. covid test. 1/30 or 2/1
input(s): APTT in the last 72 hours. LIVER PROFILE:No results for input(s): AST, ALT, BILIDIR, BILITOT, ALKPHOS in the last 72 hours.     Imaging Last 24 Hours:  Egd    Result Date: 2/5/2021  No dictation     Colonoscopy    Result Date: 2/5/2021  No dictation     Assessment//Plan           Assessment & Plan    Electronically signed by Jose Spencer RN on 2/5/21 at 10:30 AM EST

## 2021-02-05 NOTE — H&P
History and Physical / Pre-Sedation Assessment    Patient:  Km Carabalol   :   1970     Intended Procedure:  egd and colonoscopy    HPI: dysphagia. Recurrent diarrhea . Fh of colon cancer/screening    Past Medical History:   has a past medical history of Villela's esophagus, Fibromyalgia, Hx of blood clots, Kidney stone, and PONV (postoperative nausea and vomiting). Past Surgical History:   has a past surgical history that includes other surgical history (10/03/2014); Appendectomy; Abdomen surgery; Cholecystectomy; Colonoscopy; Endoscopy, colon, diagnostic; Hysterectomy; Tonsillectomy; Upper gastrointestinal endoscopy (N/A, 2018); Upper gastrointestinal endoscopy (N/A, 2019); Upper gastrointestinal endoscopy (N/A, 2019); Breast surgery (Left); Colonoscopy (2019); Colonoscopy (2019); Upper gastrointestinal endoscopy (N/A, 2019); and Upper gastrointestinal endoscopy (N/A, 2019). Medications:  Prior to Admission medications    Medication Sig Start Date End Date Taking? Authorizing Provider   famotidine (PEPCID) 20 MG tablet Take 1 tablet by mouth 2 times daily 20 Yes Kayode Gonzales MD   ondansetron (ZOFRAN ODT) 4 MG disintegrating tablet Take 1 tablet by mouth every 8 hours as needed for Nausea 20  Yes Lan Howard PA-C   pantoprazole (PROTONIX) 40 MG tablet 2 times daily  19  Yes Historical Provider, MD   DULoxetine HCl (CYMBALTA PO) Take 90 mg by mouth daily   Yes Historical Provider, MD   cyclobenzaprine (FLEXERIL) 5 MG tablet Take 5 mg by mouth 3 times daily as needed for Muscle spasms   Yes Historical Provider, MD   amitriptyline (ELAVIL) 100 MG tablet Take 100 mg by mouth nightly as needed    Yes Historical Provider, MD       Family History:  family history is not on file. Social History:   reports that she has never smoked.  She has never used smokeless tobacco. She reports that she does not drink alcohol or use drugs. Allergies:  Rocephin [ceftriaxone]    ROS:  twelve point system review was unremarkable except for above noted history. Nurses notes reviewed and agreed. Medications reviewed    Physical Exam:  Vital Signs: BP (!) 143/80   Pulse 90   Temp 98.5 °F (36.9 °C) (Temporal)   Resp 18   Ht 5' 5\" (1.651 m)   Wt 230 lb (104.3 kg)   LMP 08/30/2015   SpO2 98%   BMI 38.27 kg/m²    Skin: normal  HEENT: normal  Neck: supple. No adenopathy. No thyromegaly. No JVD. Pulmonary:Normal  Cardiac:Normal  Abdomen:Normal  MS: normal  Neuro: normal  Ext: no edema. Pulses normal    Pre-Procedure Assessment / Plan:  ASA 2 - Patient with mild systemic disease with no functional limitations  Mallampati Airway Assessment:  Mallampati Class II - (soft palate, fauces & uvula are visible)  Level of Sedation Plan: Moderate sedation  Post Procedure plan: Return to same level of care    I assessed the patient and find that the patient is in satisfactory condition to proceed with the planned procedure and sedation plan. I have explained the risk, benefits, and alternatives to the procedure. The patient understands and agrees to proceed.   Braydon Sheldon  12:20 PM 2/5/2021

## 2021-02-06 NOTE — OP NOTE
[facility] [address], [address]                                OPERATIVE REPORT    PATIENT NAME: Radha Medina                      :        1970  MED REC NO:   [id number]                          ROOM:  ACCOUNT NO:   [de-identified]                           ADMIT DATE: 2021  PROVIDER:     Richi Miller MD    DATE OF PROCEDURE:  2021    SURGEON:  Richi Miller MD    INDICATIONS FOR PROCEDURE:  A 55-year-old woman who presented with  recurrent diarrhea, nausea, vomiting, and dysphagia. She is known to  have Villela's esophagus and previously biopsy was indefinite for  dysplasia. The patient has been taking PPI. Today, she is having  endoscopic evaluation and colonoscopy. DESCRIPTION OF THE PROCEDURE:  EGD:  With the patient in the left  lateral position and after sedation with IV Diprivan, the Olympus video  endoscope was introduced into the esophagus and advanced toward the  gastroesophageal junction. Hiatus hernia with short Villela's and mild  esophagitis were noted and biopsies were obtained. There was no sign of  esophageal stricture or neoplasm, and therefore dilation was not  performed. Stomach was carefully inspected and it was normal.  Biopsies  were obtained for Helicobacter pylori. The duodenum was normal.  Scope  was then removed without complication. COLONOSCOPY:  The Olympus video colonoscope was then inserted into the  rectum and carefully advanced to the cecum. Careful inspection did not  show any sign of carcinoma, polyp, or inflammatory bowel. Random  biopsies were obtained to rule out microscopic colitis. Scope was then  removed without complication. IMPRESSION:  1. Hiatus hernia. 2.  Short segment Villela's. 3.  Very mild esophagitis. 4.  Normal colonoscopy. ADDENDUM:  The patient's family history is significant for colon cancer. ESTIMATED BLOOD LOSS:  None.

## 2021-03-03 ENCOUNTER — TELEPHONE (OUTPATIENT)
Dept: INTERNAL MEDICINE CLINIC | Age: 51
End: 2021-03-03

## 2021-03-03 NOTE — TELEPHONE ENCOUNTER
Patient called to ask Dr. Martin Aragon if would accept new patients take her , herself and her oldest son who is 32 with special needs. Patient was referred to Dr. Martin Aragon by Dr. Lan Richey. Please call and advise.

## 2021-03-25 ENCOUNTER — OFFICE VISIT (OUTPATIENT)
Dept: INTERNAL MEDICINE CLINIC | Age: 51
End: 2021-03-25
Payer: COMMERCIAL

## 2021-03-25 VITALS
WEIGHT: 258.8 LBS | DIASTOLIC BLOOD PRESSURE: 84 MMHG | BODY MASS INDEX: 43.07 KG/M2 | SYSTOLIC BLOOD PRESSURE: 128 MMHG | TEMPERATURE: 97.1 F

## 2021-03-25 DIAGNOSIS — E66.01 MORBID OBESITY (HCC): ICD-10-CM

## 2021-03-25 DIAGNOSIS — R74.8 ELEVATED LIVER ENZYMES: ICD-10-CM

## 2021-03-25 DIAGNOSIS — Z12.4 CERVICAL CANCER SCREENING: ICD-10-CM

## 2021-03-25 DIAGNOSIS — Z12.31 ENCOUNTER FOR SCREENING MAMMOGRAM FOR MALIGNANT NEOPLASM OF BREAST: ICD-10-CM

## 2021-03-25 DIAGNOSIS — Z11.4 SCREENING FOR HUMAN IMMUNODEFICIENCY VIRUS WITHOUT PRESENCE OF RISK FACTORS: ICD-10-CM

## 2021-03-25 DIAGNOSIS — K21.00 GASTROESOPHAGEAL REFLUX DISEASE WITH ESOPHAGITIS WITHOUT HEMORRHAGE: Primary | ICD-10-CM

## 2021-03-25 DIAGNOSIS — Z00.00 WELL ADULT EXAM: ICD-10-CM

## 2021-03-25 DIAGNOSIS — Z11.59 ENCOUNTER FOR HCV SCREENING TEST FOR LOW RISK PATIENT: ICD-10-CM

## 2021-03-25 PROCEDURE — G8427 DOCREV CUR MEDS BY ELIG CLIN: HCPCS | Performed by: HOSPITALIST

## 2021-03-25 PROCEDURE — G8484 FLU IMMUNIZE NO ADMIN: HCPCS | Performed by: HOSPITALIST

## 2021-03-25 PROCEDURE — G8417 CALC BMI ABV UP PARAM F/U: HCPCS | Performed by: HOSPITALIST

## 2021-03-25 PROCEDURE — 3017F COLORECTAL CA SCREEN DOC REV: CPT | Performed by: HOSPITALIST

## 2021-03-25 PROCEDURE — 1036F TOBACCO NON-USER: CPT | Performed by: HOSPITALIST

## 2021-03-25 PROCEDURE — 99204 OFFICE O/P NEW MOD 45 MIN: CPT | Performed by: HOSPITALIST

## 2021-03-25 ASSESSMENT — PATIENT HEALTH QUESTIONNAIRE - PHQ9
SUM OF ALL RESPONSES TO PHQ QUESTIONS 1-9: 0
SUM OF ALL RESPONSES TO PHQ9 QUESTIONS 1 & 2: 0

## 2021-03-25 ASSESSMENT — ENCOUNTER SYMPTOMS
EYES NEGATIVE: 1
RESPIRATORY NEGATIVE: 1

## 2021-03-25 NOTE — PROGRESS NOTES
Outpatient Note for New Patient Visit    Patient:  Yee Frederick                                               : 1970  Age: 49 y.o. MRN: [id number]  Date : 3/25/2021    History Obtained From:  patient      OF PRESENT ILLNESS:   The patient is a 49 y.o. female who presents to establish her care in our medical office. Her previous primary care physician was Dr. Irene Schulte who recently retired. She has history of obesity, elevated liver enzymes, clotting abnormality including factor V Leiden mutation MTHFR, and protein as deficiency. He never had DVT nor PE, although had problems with infertility and had premature delivery ( 32 weeks) with her first child. She follows up regularly with Dr Jr Rodriguez, GI specialist, for history of GERD and Gurinder's esophagus. She struggles with her weight. Doesn't exercise regularly. + history of fibromyalgia, takes duloxetine and Amitriptyline with relatively good pain control.  + history of recurrent kidney stones.     Past Medical History:        Diagnosis Date    Villela's esophagus     Fibromyalgia     Hx of blood clots     has 3 blood clotting factors  liden factor 5, MTHFR, ProteinS    Kidney stone     PONV (postoperative nausea and vomiting)     AND ITCHING       Past Surgical History:        Procedure Laterality Date    ABDOMEN SURGERY      cholecystectomy, 3 c sections, tubaligations, hysterectomy    APPENDECTOMY      BREAST SURGERY Left     benign 3 times- benign cysts, 2016 benign lumpectomy    CHOLECYSTECTOMY      COLONOSCOPY      COLONOSCOPY  2019    COLONOSCOPY WITH BIOPSY performed by Stephanie Adamson MD at [facility]  2019    COLONOSCOPY POLYPECTOMY HOT BIOPSY performed by Stephanie Adamson MD at [facility] N/A 2021    COLONOSCOPY POLYPECTOMY SNARE/COLD BIOPSY performed by Stephanie Adamson MD at [facility], [address] [place] [address] 10/03/2014    Push Enteroscopy with small bowel biopsy    TONSILLECTOMY      UPPER GASTROINTESTINAL ENDOSCOPY N/A 08/17/2018    EGD WITH ESOPHAGEAL  BIOPSY FOR COATES'S AND GASTRIC BIOPSY FOR H PYLORI performed by Cathryn Novak MD at [address] [place] [address] N/A 04/16/2019    EGD BIOPSY performed by Cathryn Nvoak MD at [address] [place] [address] N/A 05/28/2019    EGD BIOPSY performed by Cathryn Novak MD at [address] [place] [address] N/A 11/05/2019    EGD BIOPSY performed by Cathryn Novak MD at [address] [place] [address] N/A 12/13/2019    EGD BIOPSY performed by Cathryn Novak MD at [address] [place] [address] N/A 2/5/2021    ESOPHAGOGASTRODUODENOSCOPY performed by Cathryn Novak MD at [facility] ENDOSCOPY       Family History:       Problem Relation Age of Onset    Kidney stones Father     Heart Disease Maternal Grandmother     Other Maternal Grandmother     Hypertension Maternal Grandfather     Diabetes type 2  Maternal Grandfather     Colon Cancer Maternal Grandfather     Ovarian Cancer Paternal Grandmother     Heart Disease Paternal Grandmother        Social History:   TOBACCO:   reports that she has never smoked. She has never used smokeless tobacco.  ETOH:   reports no history of alcohol use. 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 Allergies:  Rocephin [ceftriaxone]    Current Medications:    Prior to Admission medications    Medication Sig Start Date End Date Taking?  Authorizing Provider   famotidine (PEPCID) 20 MG tablet Take 1 tablet by mouth 2 times daily 12/26/20 3/25/21 Yes Valentina Olvera MD   ondansetron (ZOFRAN ODT) 4 MG disintegrating tablet Take 1 tablet by mouth every 8 hours as needed for Nausea 11/7/20  Yes Rebecca Ordonez PA-C   pantoprazole (PROTONIX) 40 MG tablet 2 times daily  4/11/19  Yes Historical Provider, MD   DULoxetine HCl (CYMBALTA PO) Take 90 mg by mouth daily   Yes Historical Provider, MD   cyclobenzaprine (FLEXERIL) 5 MG tablet Take 5 mg by mouth 3 times daily as needed for Muscle spasms   Yes Historical Provider, MD   amitriptyline (ELAVIL) 100 MG tablet Take 100 mg by mouth nightly as needed    Yes Historical Provider, MD       REVIEW OF SYSTEMS:  Review of Systems   Constitutional: Negative. HENT: Negative. Eyes: Negative. Respiratory: Negative. Cardiovascular: Negative. Gastrointestinal:        Heartburn is controlled. Follows up with Dr Kam Crocker. Reports R flank pain   Genitourinary:        History of calcium oxalate kidney stones. Follows up with Dr Horacio Lawler, nephrology, and Dr Cezar Gauthier, urology   Musculoskeletal: Negative. Neurological: Negative. Psychiatric/Behavioral: Positive for sleep disturbance. Physical Exam:      Vitals: /84 (Site: Left Upper Arm, Position: Sitting, Cuff Size: Large Adult)   Temp 97.1 °F (36.2 °C) (Temporal)   Wt 258 lb 12.8 oz (117.4 kg)   LMP 08/30/2015   BMI 43.07 kg/m²     Body mass index is 43.07 kg/m². Wt Readings from Last 3 Encounters:   03/25/21 258 lb 12.8 oz (117.4 kg)   02/05/21 230 lb (104.3 kg)   01/30/21 260 lb 2.3 oz (118 kg)     Physical Exam  Vitals signs and nursing note reviewed. Constitutional:       General: She is not in acute distress. Appearance: Normal appearance. She is well-developed. She is obese. HENT:      Head: Normocephalic and atraumatic. Right Ear: Tympanic membrane, ear canal and external ear normal. There is no impacted cerumen. Left Ear: Tympanic membrane, ear canal and external ear normal. There is no impacted cerumen. Nose: Nose normal.      Mouth/Throat:      Mouth: Mucous membranes are moist.      Pharynx: Oropharynx is clear. No oropharyngeal exudate or posterior oropharyngeal erythema. Eyes:      General: No scleral icterus. Extraocular Movements: Extraocular movements intact.       Conjunctiva/sclera: Conjunctivae normal. Pupils: Pupils are equal, round, and reactive to light. Neck:      Musculoskeletal: Normal range of motion and neck supple. No neck rigidity or muscular tenderness. Vascular: No carotid bruit or JVD. Cardiovascular:      Rate and Rhythm: Normal rate and regular rhythm. Pulses: Normal pulses. Heart sounds: Normal heart sounds. No murmur. No friction rub. No gallop. Pulmonary:      Effort: Pulmonary effort is normal. No respiratory distress. Breath sounds: Normal breath sounds. No wheezing or rales. Abdominal:      General: Bowel sounds are normal. There is no distension. Palpations: Abdomen is soft. Tenderness: There is no abdominal tenderness. There is no right CVA tenderness or left CVA tenderness. Comments: Obese   Musculoskeletal: Normal range of motion. General: No tenderness. Right lower leg: No edema. Left lower leg: No edema. Lymphadenopathy:      Cervical: No cervical adenopathy. Skin:     General: Skin is warm and dry. Capillary Refill: Capillary refill takes less than 2 seconds. Findings: No erythema or rash. Neurological:      General: No focal deficit present. Mental Status: She is alert and oriented to person, place, and time. Cranial Nerves: No cranial nerve deficit. Sensory: No sensory deficit. Psychiatric:         Mood and Affect: Mood normal.         Behavior: Behavior normal.        Assessment/Plan:   Angie was seen today for new patient. Diagnoses and all orders for this visit:    Gastroesophageal reflux disease with esophagitis without hemorrhage     -     Controlled; continue current meds    Morbid obesity ([address]       -     Encouraged to start regular exercise and reduce calorie intake with meals    Elevated liver enzymes ( AST-43; ALT-63 on blood work from 9/30/2020)  -     Comprehensive Metabolic Panel;  Future    Encounter for screening mammogram for malignant neoplasm of breast  -     [facility] DIGITAL SCREENING AUGMENTED BILATERAL; Future    Well adult exam  -     CBC Auto Differential; Future  -     Comprehensive Metabolic Panel; Future  -     Lipid Panel; Future  -     TSH without Reflex; Future  -     Vitamin D 25 Hydroxy; Future    Cervical cancer screening  -     Johnie jOeda MD, Gynecology, [facility]    Encounter for HCV screening test for low risk patient  -     HEPATITIS C ANTIBODY; Future    Screening for human immunodeficiency virus without presence of risk factors  -     HIV Screen; Future            Return in about 3 months (around 6/25/2021) for gerd, obesity, elevated liver enzymes.      Margarita Lewis M.D.   3/25/2021, 3:17 PM

## 2021-03-29 DIAGNOSIS — R74.8 ELEVATED LIVER ENZYMES: ICD-10-CM

## 2021-03-29 DIAGNOSIS — Z00.00 WELL ADULT EXAM: ICD-10-CM

## 2021-03-29 DIAGNOSIS — Z11.4 SCREENING FOR HUMAN IMMUNODEFICIENCY VIRUS WITHOUT PRESENCE OF RISK FACTORS: ICD-10-CM

## 2021-03-29 DIAGNOSIS — Z11.59 ENCOUNTER FOR HCV SCREENING TEST FOR LOW RISK PATIENT: ICD-10-CM

## 2021-03-29 LAB
A/G RATIO: 1.6 (ref 1.1–2.2)
ALBUMIN SERPL-MCNC: 4.1 G/DL (ref 3.4–5)
ALP BLD-CCNC: 60 U/L (ref 40–129)
ALT SERPL-CCNC: 71 U/L (ref 10–40)
ANION GAP SERPL CALCULATED.3IONS-SCNC: 14 MMOL/L (ref 3–16)
AST SERPL-CCNC: 42 U/L (ref 15–37)
BASOPHILS ABSOLUTE: 0.1 K/UL (ref 0–0.2)
BASOPHILS RELATIVE PERCENT: 1.5 %
BILIRUB SERPL-MCNC: 0.3 MG/DL (ref 0–1)
BUN BLDV-MCNC: 12 MG/DL (ref 7–20)
CALCIUM SERPL-MCNC: 9.1 MG/DL (ref 8.3–10.6)
CHLORIDE BLD-SCNC: 109 MMOL/L (ref 99–110)
CHOLESTEROL, TOTAL: 228 MG/DL (ref 0–199)
CO2: 23 MMOL/L (ref 21–32)
CREAT SERPL-MCNC: 0.8 MG/DL (ref 0.6–1.1)
EOSINOPHILS ABSOLUTE: 0.4 K/UL (ref 0–0.6)
EOSINOPHILS RELATIVE PERCENT: 4.5 %
GFR AFRICAN AMERICAN: >60
GFR NON-AFRICAN AMERICAN: >60
GLOBULIN: 2.5 G/DL
GLUCOSE BLD-MCNC: 101 MG/DL (ref 70–99)
HCT VFR BLD CALC: 40.5 % (ref 36–48)
HDLC SERPL-MCNC: 39 MG/DL (ref 40–60)
HEMOGLOBIN: 13.4 G/DL (ref 12–16)
LDL CHOLESTEROL CALCULATED: 161 MG/DL
LYMPHOCYTES ABSOLUTE: 2.6 K/UL (ref 1–5.1)
LYMPHOCYTES RELATIVE PERCENT: 32.5 %
MCH RBC QN AUTO: 29.1 PG (ref 26–34)
MCHC RBC AUTO-ENTMCNC: 33.2 G/DL (ref 31–36)
MCV RBC AUTO: 87.8 FL (ref 80–100)
MONOCYTES ABSOLUTE: 0.6 K/UL (ref 0–1.3)
MONOCYTES RELATIVE PERCENT: 7.6 %
NEUTROPHILS ABSOLUTE: 4.3 K/UL (ref 1.7–7.7)
NEUTROPHILS RELATIVE PERCENT: 53.9 %
PDW BLD-RTO: 14.2 % (ref 12.4–15.4)
PLATELET # BLD: 271 K/UL (ref 135–450)
PMV BLD AUTO: 8.8 FL (ref 5–10.5)
POTASSIUM SERPL-SCNC: 4.4 MMOL/L (ref 3.5–5.1)
RBC # BLD: 4.61 M/UL (ref 4–5.2)
SODIUM BLD-SCNC: 146 MMOL/L (ref 136–145)
TOTAL PROTEIN: 6.6 G/DL (ref 6.4–8.2)
TRIGL SERPL-MCNC: 139 MG/DL (ref 0–150)
TSH SERPL DL<=0.05 MIU/L-ACNC: 1.65 UIU/ML (ref 0.27–4.2)
VLDLC SERPL CALC-MCNC: 28 MG/DL
WBC # BLD: 7.9 K/UL (ref 4–11)

## 2021-03-30 DIAGNOSIS — E78.5 DYSLIPIDEMIA: Primary | ICD-10-CM

## 2021-03-30 DIAGNOSIS — E55.9 VITAMIN D DEFICIENCY: ICD-10-CM

## 2021-03-30 LAB
HEPATITIS C ANTIBODY INTERPRETATION: NORMAL
HIV AG/AB: NORMAL
HIV ANTIGEN: NORMAL
HIV-1 ANTIBODY: NORMAL
HIV-2 AB: NORMAL
VITAMIN D 25-HYDROXY: 15 NG/ML

## 2021-03-30 RX ORDER — ERGOCALCIFEROL 1.25 MG/1
50000 CAPSULE ORAL WEEKLY
Qty: 12 CAPSULE | Refills: 3 | Status: SHIPPED | OUTPATIENT
Start: 2021-03-30

## 2021-03-30 RX ORDER — ROSUVASTATIN CALCIUM 5 MG/1
5 TABLET, COATED ORAL DAILY
Qty: 30 TABLET | Refills: 5 | Status: SHIPPED
Start: 2021-03-30 | End: 2022-03-09 | Stop reason: CLARIF

## 2021-04-06 ENCOUNTER — HOSPITAL ENCOUNTER (OUTPATIENT)
Dept: MAMMOGRAPHY | Age: 51
Discharge: HOME | End: 2021-04-06
Payer: COMMERCIAL

## 2021-04-06 DIAGNOSIS — Z12.31 ENCOUNTER FOR SCREENING MAMMOGRAM FOR MALIGNANT NEOPLASM OF BREAST: ICD-10-CM

## 2021-04-06 PROCEDURE — 77063 BREAST TOMOSYNTHESIS BI: CPT

## 2021-04-13 ENCOUNTER — PATIENT MESSAGE (OUTPATIENT)
Dept: INTERNAL MEDICINE CLINIC | Age: 51
End: 2021-04-13

## 2021-04-15 ENCOUNTER — OFFICE VISIT (OUTPATIENT)
Dept: INTERNAL MEDICINE CLINIC | Age: 51
End: 2021-04-15
Payer: COMMERCIAL

## 2021-04-15 VITALS
SYSTOLIC BLOOD PRESSURE: 115 MMHG | BODY MASS INDEX: 43.15 KG/M2 | DIASTOLIC BLOOD PRESSURE: 73 MMHG | HEIGHT: 65 IN | TEMPERATURE: 97.2 F | WEIGHT: 259 LBS

## 2021-04-15 DIAGNOSIS — R10.9 RIGHT FLANK PAIN: Primary | ICD-10-CM

## 2021-04-15 DIAGNOSIS — R82.90 ABNORMAL URINALYSIS: ICD-10-CM

## 2021-04-15 DIAGNOSIS — R11.0 NAUSEA IN ADULT PATIENT: ICD-10-CM

## 2021-04-15 LAB
BILIRUBIN, POC: ABNORMAL
BLOOD URINE, POC: ABNORMAL
CLARITY, POC: CLEAR
COLOR, POC: YELLOW
GLUCOSE URINE, POC: ABNORMAL
KETONES, POC: ABNORMAL
LEUKOCYTE EST, POC: ABNORMAL
NITRITE, POC: ABNORMAL
PH, POC: 5
PROTEIN, POC: ABNORMAL
SPECIFIC GRAVITY, POC: 1.01
UROBILINOGEN, POC: 2

## 2021-04-15 PROCEDURE — 81002 URINALYSIS NONAUTO W/O SCOPE: CPT | Performed by: HOSPITALIST

## 2021-04-15 PROCEDURE — G8417 CALC BMI ABV UP PARAM F/U: HCPCS | Performed by: HOSPITALIST

## 2021-04-15 PROCEDURE — G8427 DOCREV CUR MEDS BY ELIG CLIN: HCPCS | Performed by: HOSPITALIST

## 2021-04-15 PROCEDURE — 99213 OFFICE O/P EST LOW 20 MIN: CPT | Performed by: HOSPITALIST

## 2021-04-15 PROCEDURE — 3017F COLORECTAL CA SCREEN DOC REV: CPT | Performed by: HOSPITALIST

## 2021-04-15 PROCEDURE — 1036F TOBACCO NON-USER: CPT | Performed by: HOSPITALIST

## 2021-04-15 RX ORDER — PROMETHAZINE HYDROCHLORIDE 12.5 MG/1
12.5 TABLET ORAL 3 TIMES DAILY PRN
Qty: 15 TABLET | Refills: 1 | Status: SHIPPED | OUTPATIENT
Start: 2021-04-15 | End: 2021-04-22

## 2021-04-15 RX ORDER — OXYCODONE HYDROCHLORIDE AND ACETAMINOPHEN 5; 325 MG/1; MG/1
1 TABLET ORAL EVERY 6 HOURS PRN
Qty: 12 TABLET | Refills: 0 | Status: SHIPPED | OUTPATIENT
Start: 2021-04-15 | End: 2021-04-18

## 2021-04-15 RX ORDER — TAMSULOSIN HYDROCHLORIDE 0.4 MG/1
0.4 CAPSULE ORAL DAILY
Qty: 7 CAPSULE | Refills: 1 | Status: SHIPPED | OUTPATIENT
Start: 2021-04-15 | End: 2021-07-23

## 2021-04-15 RX ORDER — AMLODIPINE BESYLATE 2.5 MG/1
2.5 TABLET ORAL DAILY
Qty: 30 TABLET | Refills: 3 | Status: SHIPPED
Start: 2021-04-15 | End: 2022-03-09 | Stop reason: CLARIF

## 2021-04-15 NOTE — PROGRESS NOTES
Follow Up Visit Established Patient Visit    Patient:  Lisa Lema                                               : 1970  Age: 49 y.o. MRN: [id number]  Date : 4/15/2021    Lisa Lema is a 49 y.o. female who presents for : Evaluation of right flank pain    Chief Complaint   Patient presents with    Back Pain     Reports having R flank pain with radiation down to R groin since last Monday evening. Takes Ibuprofen 600 mg PO 3-4 times/ day as needed for pain. + some subjective chills, but no fever. No gross hematuria. Has history of kidney stones in the past.  She had CT scan of the abdomen and pelvis on 2020:  No acute abdominopelvic abnormality.       Nonobstructive right renal calculi measuring up to 4 mm.       Hepatic steatosis.       Small pericardial effusion.          Past Medical History:   Diagnosis Date    Coates's esophagus     Fibromyalgia     Hx of blood clots     has 3 blood clotting factors  liden factor 5, MTHFR, ProteinS    Kidney stone     PONV (postoperative nausea and vomiting)     AND ITCHING       Past Surgical History:   Procedure Laterality Date    ABDOMEN SURGERY      cholecystectomy, 3 c sections, tubaligations, hysterectomy    APPENDECTOMY      BREAST SURGERY Left     benign 3 times- benign cysts, 2016 benign lumpectomy    CHOLECYSTECTOMY      COLONOSCOPY      COLONOSCOPY  2019    COLONOSCOPY WITH BIOPSY performed by Pablo Gilliland MD at [facility]  2019    COLONOSCOPY POLYPECTOMY HOT BIOPSY performed by Pablo Gilliland MD at [address] N/A 2021    COLONOSCOPY POLYPECTOMY SNARE/COLD BIOPSY performed by Pablo [facility]iland MD at [facility] OTHER SURGICAL HISTORY  10/03/2014    Push Enteroscopy with small bowel biopsy    TONSILLECTOMY      UPPER GASTROINTESTINAL ENDOSCOPY N/A 2018    EGD WITH ESOPHAGEAL  BIOPSY FOR COATES'S AND GASTRIC BIOPSY medications for this visit. /73   Temp 97.2 °F (36.2 °C)   Ht 5' 5\" (1.651 m)   Wt 259 lb (117.5 kg)   LMP 08/30/2015   BMI 43.10 kg/m²     Physical Exam   Physical Exam  Vitals signs and nursing note reviewed. Constitutional:       General: She is not in acute distress. Appearance: She is well-developed. Comments: Appears uncomfortable   HENT:      Head: Normocephalic and atraumatic. Right Ear: Tympanic membrane, ear canal and external ear normal. There is no impacted cerumen. Left Ear: Tympanic membrane, ear canal and external ear normal. There is no impacted cerumen. Mouth/Throat:      Mouth: Mucous membranes are moist.      Pharynx: Oropharynx is clear. No oropharyngeal exudate or posterior oropharyngeal erythema. Eyes:      General: No scleral icterus. Pupils: Pupils are equal, round, and reactive to light. Neck:      Musculoskeletal: Normal range of motion. Vascular: No JVD. Cardiovascular:      Rate and Rhythm: Normal rate and regular rhythm. Heart sounds: Normal heart sounds. No murmur. No friction rub. No gallop. Pulmonary:      Effort: Pulmonary effort is normal. No respiratory distress. Breath sounds: Normal breath sounds. No wheezing or rales. Abdominal:      General: Bowel sounds are normal. There is no distension. Palpations: Abdomen is soft. Tenderness: There is abdominal tenderness (mild R mesogastric area tenderness to palpation). There is right CVA tenderness (mild). Musculoskeletal: Normal range of motion. General: No tenderness. Skin:     General: Skin is warm and dry. Neurological:      General: No focal deficit present. Mental Status: She is alert and oriented to person, place, and time. Cranial Nerves: No cranial nerve deficit. Psychiatric:         Mood and Affect: Mood normal.         Assessment/ plan:     Angie was seen today for back pain.     Diagnoses and all orders for this visit: Right flank pain, most [probably, recurrent kidney stone  -     POCT Urinalysis no Micro  -     Culture, Urine  -     oxyCODONE-acetaminophen (PERCOCET) 5-325 MG per tablet; Take 1 tablet by mouth every 6 hours as needed for Pain for up to 3 days. Intended supply: 3 days. Take lowest dose possible to manage pain  -     tamsulosin (FLOMAX) 0.4 MG capsule; Take 1 capsule by mouth daily  -     amLODIPine (NORVASC) 2.5 MG tablet; Take 1 tablet by mouth daily    Abnormal urinalysis  -     Culture, Urine    Nausea in adult patient  -     promethazine (PHENERGAN) 12.5 MG tablet; Take 1 tablet by mouth 3 times daily as needed for Nausea        Return if symptoms worsen or fail to improve.     Tyra Haile MD

## 2021-04-18 ENCOUNTER — TELEPHONE (OUTPATIENT)
Dept: INTERNAL MEDICINE CLINIC | Age: 51
End: 2021-04-18

## 2021-04-18 LAB
ORGANISM: ABNORMAL
URINE CULTURE, ROUTINE: ABNORMAL

## 2021-04-18 RX ORDER — CIPROFLOXACIN 500 MG/1
500 TABLET, FILM COATED ORAL 2 TIMES DAILY
Qty: 14 TABLET | Refills: 0 | Status: SHIPPED | OUTPATIENT
Start: 2021-04-18 | End: 2021-04-25

## 2021-04-18 NOTE — TELEPHONE ENCOUNTER
Still reports some right-sided flank pain. Pain control is adequate. Having passed a kidney stone yet. No gross hematuria.   Routine 50,000 CFU/ml    Resulting Agency [address] [facility]   Susceptibility     Klebsiella pneumoniae     BACTERIAL SUSCEPTIBILITY PANEL BY KRISTINA     ampicillin >=32 mcg/mL Resistant     ceFAZolin <=4 mcg/mL Sensitive1     cefepime <=0.12 mcg/mL Sensitive     cefTRIAXone <=0.25 mcg/mL Sensitive     ciprofloxacin <=0.25 mcg/mL Sensitive     ertapenem <=0.12 mcg/mL Sensitive     gentamicin <=1 mcg/mL Sensitive     levofloxacin <=0.12 mcg/mL Sensitive     nitrofurantoin 32 mcg/mL Sensitive     piperacillin-tazobactam <=4 mcg/mL Sensitive     trimethoprim-sulfamethoxazole <=20 mcg/mL Sensitive         Ciprofloxacin 500 mg twice a day x7 days was ordered

## 2021-05-09 ENCOUNTER — HOSPITAL ENCOUNTER (EMERGENCY)
Age: 51
Discharge: HOME | End: 2021-05-10
Attending: STUDENT IN AN ORGANIZED HEALTH CARE EDUCATION/TRAINING PROGRAM
Payer: COMMERCIAL

## 2021-05-09 DIAGNOSIS — R10.9 FLANK PAIN: Primary | ICD-10-CM

## 2021-05-09 LAB
ANION GAP SERPL CALCULATED.3IONS-SCNC: 11 MMOL/L (ref 3–16)
BASOPHILS ABSOLUTE: 0.2 K/UL (ref 0–0.2)
BASOPHILS RELATIVE PERCENT: 1.6 %
BILIRUBIN URINE: NEGATIVE
BLOOD, URINE: NEGATIVE
BUN BLDV-MCNC: 14 MG/DL (ref 7–20)
CALCIUM SERPL-MCNC: 9.4 MG/DL (ref 8.3–10.6)
CHLORIDE BLD-SCNC: 104 MMOL/L (ref 99–110)
CLARITY: CLEAR
CO2: 26 MMOL/L (ref 21–32)
COLOR: YELLOW
CREAT SERPL-MCNC: 1 MG/DL (ref 0.6–1.1)
EOSINOPHILS ABSOLUTE: 0.4 K/UL (ref 0–0.6)
EOSINOPHILS RELATIVE PERCENT: 3.9 %
EPITHELIAL CELLS, UA: NORMAL /HPF (ref 0–5)
GFR AFRICAN AMERICAN: >60
GFR NON-AFRICAN AMERICAN: 59
GLUCOSE BLD-MCNC: 103 MG/DL (ref 70–99)
GLUCOSE URINE: NEGATIVE MG/DL
HCT VFR BLD CALC: 42.2 % (ref 36–48)
HEMOGLOBIN: 14.1 G/DL (ref 12–16)
KETONES, URINE: NEGATIVE MG/DL
LEUKOCYTE ESTERASE, URINE: ABNORMAL
LYMPHOCYTES ABSOLUTE: 4 K/UL (ref 1–5.1)
LYMPHOCYTES RELATIVE PERCENT: 38.5 %
MCH RBC QN AUTO: 28.6 PG (ref 26–34)
MCHC RBC AUTO-ENTMCNC: 33.5 G/DL (ref 31–36)
MCV RBC AUTO: 85.4 FL (ref 80–100)
MICROSCOPIC EXAMINATION: YES
MONOCYTES ABSOLUTE: 0.9 K/UL (ref 0–1.3)
MONOCYTES RELATIVE PERCENT: 8.8 %
NEUTROPHILS ABSOLUTE: 4.9 K/UL (ref 1.7–7.7)
NEUTROPHILS RELATIVE PERCENT: 47.2 %
NITRITE, URINE: NEGATIVE
PDW BLD-RTO: 13.7 % (ref 12.4–15.4)
PH UA: 6.5 (ref 5–8)
PLATELET # BLD: 292 K/UL (ref 135–450)
PMV BLD AUTO: 7.9 FL (ref 5–10.5)
POTASSIUM REFLEX MAGNESIUM: 4 MMOL/L (ref 3.5–5.1)
PROTEIN UA: NEGATIVE MG/DL
RBC # BLD: 4.93 M/UL (ref 4–5.2)
RBC UA: NORMAL /HPF (ref 0–4)
SODIUM BLD-SCNC: 141 MMOL/L (ref 136–145)
SPECIFIC GRAVITY UA: 1.02 (ref 1–1.03)
URINE TYPE: ABNORMAL
UROBILINOGEN, URINE: 0.2 E.U./DL
WBC # BLD: 10.5 K/UL (ref 4–11)
WBC UA: NORMAL /HPF (ref 0–5)

## 2021-05-09 PROCEDURE — 6360000002 HC RX W HCPCS: Performed by: EMERGENCY MEDICINE

## 2021-05-09 PROCEDURE — 96374 THER/PROPH/DIAG INJ IV PUSH: CPT

## 2021-05-09 PROCEDURE — 96375 TX/PRO/DX INJ NEW DRUG ADDON: CPT

## 2021-05-09 PROCEDURE — 96376 TX/PRO/DX INJ SAME DRUG ADON: CPT

## 2021-05-09 PROCEDURE — 85025 COMPLETE CBC W/AUTO DIFF WBC: CPT

## 2021-05-09 PROCEDURE — 80048 BASIC METABOLIC PNL TOTAL CA: CPT

## 2021-05-09 PROCEDURE — 81001 URINALYSIS AUTO W/SCOPE: CPT

## 2021-05-09 PROCEDURE — 2580000003 HC RX 258: Performed by: EMERGENCY MEDICINE

## 2021-05-09 RX ORDER — SODIUM CHLORIDE, SODIUM LACTATE, POTASSIUM CHLORIDE, CALCIUM CHLORIDE 600; 310; 30; 20 MG/100ML; MG/100ML; MG/100ML; MG/100ML
1000 INJECTION, SOLUTION INTRAVENOUS ONCE
Status: COMPLETED | OUTPATIENT
Start: 2021-05-09 | End: 2021-05-09

## 2021-05-09 RX ORDER — PROMETHAZINE HYDROCHLORIDE 25 MG/ML
12.5 INJECTION, SOLUTION INTRAMUSCULAR; INTRAVENOUS ONCE
Status: COMPLETED | OUTPATIENT
Start: 2021-05-09 | End: 2021-05-09

## 2021-05-09 RX ORDER — KETOROLAC TROMETHAMINE 30 MG/ML
15 INJECTION, SOLUTION INTRAMUSCULAR; INTRAVENOUS ONCE
Status: COMPLETED | OUTPATIENT
Start: 2021-05-09 | End: 2021-05-09

## 2021-05-09 RX ADMIN — KETOROLAC TROMETHAMINE 15 MG: 30 INJECTION, SOLUTION INTRAMUSCULAR; INTRAVENOUS at 22:01

## 2021-05-09 RX ADMIN — HYDROMORPHONE HYDROCHLORIDE 1 MG: 1 INJECTION, SOLUTION INTRAMUSCULAR; INTRAVENOUS; SUBCUTANEOUS at 23:30

## 2021-05-09 RX ADMIN — PROMETHAZINE HYDROCHLORIDE 12.5 MG: 25 INJECTION INTRAMUSCULAR; INTRAVENOUS at 22:01

## 2021-05-09 RX ADMIN — SODIUM CHLORIDE, POTASSIUM CHLORIDE, SODIUM LACTATE AND CALCIUM CHLORIDE 1000 ML: 600; 310; 30; 20 INJECTION, SOLUTION INTRAVENOUS at 22:01

## 2021-05-09 ASSESSMENT — PAIN DESCRIPTION - DESCRIPTORS: DESCRIPTORS: BURNING

## 2021-05-09 ASSESSMENT — PAIN SCALES - GENERAL: PAINLEVEL_OUTOF10: 10

## 2021-05-09 ASSESSMENT — PAIN DESCRIPTION - PROGRESSION: CLINICAL_PROGRESSION: GRADUALLY WORSENING

## 2021-05-09 ASSESSMENT — PAIN DESCRIPTION - FREQUENCY: FREQUENCY: CONTINUOUS

## 2021-05-09 ASSESSMENT — PAIN DESCRIPTION - PAIN TYPE: TYPE: ACUTE PAIN

## 2021-05-10 ENCOUNTER — APPOINTMENT (OUTPATIENT)
Dept: CT IMAGING | Age: 51
End: 2021-05-10
Payer: COMMERCIAL

## 2021-05-10 VITALS
OXYGEN SATURATION: 93 % | WEIGHT: 250 LBS | SYSTOLIC BLOOD PRESSURE: 126 MMHG | DIASTOLIC BLOOD PRESSURE: 55 MMHG | RESPIRATION RATE: 16 BRPM | HEIGHT: 65 IN | TEMPERATURE: 98.1 F | BODY MASS INDEX: 41.65 KG/M2 | HEART RATE: 96 BPM

## 2021-05-10 PROCEDURE — 6360000002 HC RX W HCPCS: Performed by: EMERGENCY MEDICINE

## 2021-05-10 PROCEDURE — 96376 TX/PRO/DX INJ SAME DRUG ADON: CPT

## 2021-05-10 PROCEDURE — 74176 CT ABD & PELVIS W/O CONTRAST: CPT

## 2021-05-10 PROCEDURE — 96375 TX/PRO/DX INJ NEW DRUG ADDON: CPT

## 2021-05-10 PROCEDURE — 99283 EMERGENCY DEPT VISIT LOW MDM: CPT

## 2021-05-10 PROCEDURE — 96374 THER/PROPH/DIAG INJ IV PUSH: CPT

## 2021-05-10 RX ORDER — ONDANSETRON HYDROCHLORIDE 8 MG/1
8 TABLET, FILM COATED ORAL EVERY 8 HOURS PRN
Qty: 20 TABLET | Refills: 0 | Status: SHIPPED | OUTPATIENT
Start: 2021-05-10 | End: 2022-03-25 | Stop reason: ALTCHOICE

## 2021-05-10 RX ORDER — OXYCODONE HYDROCHLORIDE AND ACETAMINOPHEN 5; 325 MG/1; MG/1
1 TABLET ORAL EVERY 6 HOURS PRN
Qty: 6 TABLET | Refills: 0 | Status: SHIPPED | OUTPATIENT
Start: 2021-05-10 | End: 2021-05-13

## 2021-05-10 RX ADMIN — HYDROMORPHONE HYDROCHLORIDE 1 MG: 1 INJECTION, SOLUTION INTRAMUSCULAR; INTRAVENOUS; SUBCUTANEOUS at 00:59

## 2021-05-10 RX ADMIN — HYDROMORPHONE HYDROCHLORIDE 0.5 MG: 1 INJECTION, SOLUTION INTRAMUSCULAR; INTRAVENOUS; SUBCUTANEOUS at 02:35

## 2021-05-10 ASSESSMENT — PAIN SCALES - GENERAL: PAINLEVEL_OUTOF10: 6

## 2021-05-10 NOTE — ED TRIAGE NOTES
Onset of R abdomen-back-flank pain with nausea, pressure, 2 days ago. History of kidney stones, feels like the same. No notable hematuria. No fever/+occasional chills, +nausea, no V/D.

## 2021-05-10 NOTE — ED PROVIDER NOTES
Date of evaluation: 5/9/2021    Chief Complaint   Nephrolithiasis (History of same. Sx x 2 days, had one 2 wks ago as well that passed)      Nursing Notes, Past Medical Hx, Past Surgical Hx, Social Hx, Allergies, and Family Hx were reviewed. History of Present Illness     Angie Schaffer is a 49 y.o. female who presents with right flank pain that has been going on for the past 2 days and acutely worsened & became severe today. Pain feels similar to prior episodes of kidney stones. She reports a pressure like pain in her right flank. She has associated nausea but denies vomiting, diarrhea, hematuria, dysuria, fever. Review of Systems     Review of Systems   Constitutional: Negative for appetite change and fever. HENT: Negative for congestion. Respiratory: Negative for shortness of breath. Cardiovascular: Negative for chest pain. Gastrointestinal: Positive for abdominal pain and nausea. Negative for constipation and diarrhea. Genitourinary: Positive for flank pain. Negative for frequency and hematuria. Skin: Negative for rash. All other systems reviewed and are negative. Past Medical, Surgical, Family, and Social History     She has a past medical history of Villela's esophagus, Fibromyalgia, Hx of blood clots, Kidney stone, and PONV (postoperative nausea and vomiting). She has a past surgical history that includes other surgical history (10/03/2014); Appendectomy; Abdomen surgery; Cholecystectomy; Colonoscopy; Endoscopy, colon, diagnostic; Hysterectomy; Tonsillectomy; Upper gastrointestinal endoscopy (N/A, 08/17/2018); Upper gastrointestinal endoscopy (N/A, 04/16/2019); Upper gastrointestinal endoscopy (N/A, 05/28/2019); Breast surgery (Left); Colonoscopy (08/30/2019); Colonoscopy (08/30/2019); Upper gastrointestinal endoscopy (N/A, 11/05/2019); Upper gastrointestinal endoscopy (N/A, 12/13/2019);  Upper gastrointestinal endoscopy (N/A, 2/5/2021); and Colonoscopy (N/A, Normocephalic and atraumatic. Nose: Nose normal.      Mouth/Throat:      Mouth: Mucous membranes are moist.   Eyes:      Conjunctiva/sclera: Conjunctivae normal.   Neck:      Musculoskeletal: Neck supple. Cardiovascular:      Rate and Rhythm: Normal rate and regular rhythm. Pulses: Normal pulses. Heart sounds: Normal heart sounds. Pulmonary:      Effort: Pulmonary effort is normal.      Breath sounds: Normal breath sounds. Abdominal:      General: There is no distension. Palpations: Abdomen is soft. There is no mass. Tenderness: There is abdominal tenderness. There is no guarding or rebound. Comments: R flank tenderness without peritoneal signs or guarding   Musculoskeletal:         General: No swelling or deformity. Skin:     General: Skin is warm and dry. Capillary Refill: Capillary refill takes less than 2 seconds. Neurological:      General: No focal deficit present. Mental Status: She is alert and oriented to person, place, and time. Diagnostic Results       RADIOLOGY:  CT ABDOMEN PELVIS WO CONTRAST Additional Contrast? None   Final Result     No acute findings or substantial change          LABS:   Labs Reviewed   URINALYSIS - Abnormal; Notable for the following components:       Result Value    Leukocyte Esterase, Urine SMALL (*)     All other components within normal limits    Narrative:     Performed at: [facility]  [address], [facility][note 3] [place]   Phone [phone]   BASIC METABOLIC PANEL W/ REFLEX TO MG FOR LOW K - Abnormal; Notable for the following components:    Glucose 103 (*)     GFR Non- 59 (*)     All other components within normal limits    Narrative:     Performed at: [facility]  [address], [facility][note 3] [address]   Phone [phone]   MICROSCOPIC URINALYSIS    Narrative:     Performed at:   [facility]  [address]   Phone [phone]   CBC WITH AUTO DIFFERENTIAL    Narrative:     Performed at: [facility]  [address]   Phone [phone]       VITALS:  BP: (!) 126/55, Temp: 98.1 °F (36.7 °C), Pulse: 96, Resp: 16     Procedures         ED Course     The patient was given the followingmedications:  Orders Placed This Encounter   Medications    promethazine (PHENERGAN) injection 12.5 mg    lactated ringers infusion 1,000 mL    ketorolac (TORADOL) injection 15 mg    HYDROmorphone (DILAUDID) injection 1 mg    HYDROmorphone (DILAUDID) injection 1 mg    HYDROmorphone (DILAUDID) injection 0.5 mg    ondansetron (ZOFRAN) 8 MG tablet     Sig: Take 1 tablet by mouth every 8 hours as needed for Nausea     Dispense:  20 tablet     Refill:  0    oxyCODONE-acetaminophen (PERCOCET) 5-325 MG per tablet     Sig: Take 1 tablet by mouth every 6 hours as needed for Pain for up to 3 days. WARNING:  May cause drowsiness. May impair ability to operate vehicles or machinery. Do not use in combination with alcohol. Dispense:  6 tablet     Refill:  0            CONSULTS:  None    MEDICAL DECISION MAKING     Angie Coleman is a 49 y.o. female presenting with flank pain similar to prior ureteral stone episodes. She denies fever or infectious symptoms. No signs of sepsis. No features to suggest AAA or acute intraabdominal etiology. Labs reassuring. No hydronephrosis on US. However, her pain was quite refectory. After ketorolac and 2 doses of dilaudid she was still complaining of severe pain. Given her refractory symptoms despite no evidence of obstructive stone on US, CT was performed which was negative for acute findings. I suspect stone already passed at the time of CT or functional etiology. Symptoms became controlled after 3rd dose of dilaudid.  The patient was prescribed a small amount of percocet and zofran. The patient was advised to follow-up with PCP. Discharge instructions including strict return precautions were given to the patient. All questions were addressed. The patient verbalizes understanding and is in agreement with the plan. Clinical Impression     1.  Flank pain        Delmus Later     PATIENT REFERRED TO:  Joseph Gomez MD  [address]  [address]  [phone]            DISCHARGE MEDICATIONS:  Discharge Medication List as of 5/10/2021  4:17 AM      START taking these medications    Details   ondansetron (ZOFRAN) 8 MG tablet Take 1 tablet by mouth every 8 hours as needed for Nausea, Disp-20 tablet, R-0Normal             DISPOSITION Decision To Discharge 05/10/2021 04:14:06 AM       Deana Vargas MD  05/11/21 6401

## 2021-05-10 NOTE — ED NOTES
Bed: B16-16  Expected date:   Expected time:   Means of arrival:   Comments:  Sammie Hugo, CONSTANTINE  05/09/21 2110
Pt being discharged. Went over discharge instructions, pt acknowledged understanding, and all questions answered. Removed PIV. Pt able to ambulate out of the ED with all belongings.       Homer Carlos RN  05/10/21 5114
n/a

## 2021-05-11 ASSESSMENT — ENCOUNTER SYMPTOMS
NAUSEA: 1
ABDOMINAL PAIN: 1
SHORTNESS OF BREATH: 0
CONSTIPATION: 0
DIARRHEA: 0

## 2021-07-10 ENCOUNTER — HOSPITAL ENCOUNTER (EMERGENCY)
Age: 51
Discharge: HOME | End: 2021-07-10
Attending: EMERGENCY MEDICINE
Payer: COMMERCIAL

## 2021-07-10 ENCOUNTER — APPOINTMENT (OUTPATIENT)
Dept: CT IMAGING | Age: 51
End: 2021-07-10
Payer: COMMERCIAL

## 2021-07-10 ENCOUNTER — APPOINTMENT (OUTPATIENT)
Dept: GENERAL RADIOLOGY | Age: 51
End: 2021-07-10
Payer: COMMERCIAL

## 2021-07-10 VITALS
HEIGHT: 65 IN | TEMPERATURE: 99 F | SYSTOLIC BLOOD PRESSURE: 159 MMHG | DIASTOLIC BLOOD PRESSURE: 88 MMHG | WEIGHT: 240 LBS | OXYGEN SATURATION: 100 % | BODY MASS INDEX: 39.99 KG/M2 | HEART RATE: 81 BPM | RESPIRATION RATE: 18 BRPM

## 2021-07-10 DIAGNOSIS — S23.9XXA THORACIC BACK SPRAIN, INITIAL ENCOUNTER: ICD-10-CM

## 2021-07-10 DIAGNOSIS — S16.1XXA STRAIN OF NECK MUSCLE, INITIAL ENCOUNTER: Primary | ICD-10-CM

## 2021-07-10 PROCEDURE — 70450 CT HEAD/BRAIN W/O DYE: CPT

## 2021-07-10 PROCEDURE — 99283 EMERGENCY DEPT VISIT LOW MDM: CPT

## 2021-07-10 PROCEDURE — 96372 THER/PROPH/DIAG INJ SC/IM: CPT

## 2021-07-10 PROCEDURE — 72125 CT NECK SPINE W/O DYE: CPT

## 2021-07-10 PROCEDURE — 6360000002 HC RX W HCPCS: Performed by: EMERGENCY MEDICINE

## 2021-07-10 PROCEDURE — 72100 X-RAY EXAM L-S SPINE 2/3 VWS: CPT

## 2021-07-10 PROCEDURE — 72070 X-RAY EXAM THORAC SPINE 2VWS: CPT

## 2021-07-10 RX ORDER — CYCLOBENZAPRINE HCL 10 MG
10 TABLET ORAL 3 TIMES DAILY PRN
Qty: 15 TABLET | Refills: 0
Start: 2021-07-10 | End: 2021-07-10 | Stop reason: SDUPTHER

## 2021-07-10 RX ORDER — INDOMETHACIN 25 MG/1
25 CAPSULE ORAL 3 TIMES DAILY PRN
Qty: 20 CAPSULE | Refills: 0 | Status: SHIPPED | OUTPATIENT
Start: 2021-07-10 | End: 2022-03-09 | Stop reason: CLARIF

## 2021-07-10 RX ORDER — KETOROLAC TROMETHAMINE 30 MG/ML
30 INJECTION, SOLUTION INTRAMUSCULAR; INTRAVENOUS ONCE
Status: COMPLETED | OUTPATIENT
Start: 2021-07-10 | End: 2021-07-10

## 2021-07-10 RX ORDER — CYCLOBENZAPRINE HCL 10 MG
10 TABLET ORAL 3 TIMES DAILY PRN
Qty: 15 TABLET | Refills: 0 | Status: SHIPPED | OUTPATIENT
Start: 2021-07-10 | End: 2021-09-23 | Stop reason: SDUPTHER

## 2021-07-10 RX ADMIN — KETOROLAC TROMETHAMINE 30 MG: 30 INJECTION, SOLUTION INTRAMUSCULAR at 19:52

## 2021-07-10 ASSESSMENT — ENCOUNTER SYMPTOMS
GASTROINTESTINAL NEGATIVE: 1
BACK PAIN: 1
RESPIRATORY NEGATIVE: 1
EYES NEGATIVE: 1

## 2021-07-10 ASSESSMENT — PAIN SCALES - GENERAL
PAINLEVEL_OUTOF10: 10
PAINLEVEL_OUTOF10: 10

## 2021-07-10 NOTE — ED TRIAGE NOTES
Pt arrived to ED after falling yesterday while putting up balloons. She lost footing and went down. Complaining of head and neck pain, headache, dizziness.

## 2021-07-10 NOTE — ED PROVIDER NOTES
Tonsillectomy; Upper gastrointestinal endoscopy (N/A, 08/17/2018); Upper gastrointestinal endoscopy (N/A, 04/16/2019); Upper gastrointestinal endoscopy (N/A, 05/28/2019); Breast surgery (Left); Colonoscopy (08/30/2019); Colonoscopy (08/30/2019); Upper gastrointestinal endoscopy (N/A, 11/05/2019); Upper gastrointestinal endoscopy (N/A, 12/13/2019); Upper gastrointestinal endoscopy (N/A, 2/5/2021); and Colonoscopy (N/A, 2/5/2021). Her family history includes Breast Cancer in an other family member; Kerri Liborioer in her maternal grandfather; Diabetes type 2  in her maternal grandfather; Heart Disease in her maternal grandmother and paternal grandmother; Hypertension in her maternal grandfather; Kidney stones in her father; Other in her maternal grandmother; Ovarian Cancer in her paternal grandmother. She reports that she has never smoked. She has never used smokeless tobacco. She reports that she does not drink alcohol and does not use drugs.     Medications     Discharge Medication List as of 7/10/2021  8:13 PM      CONTINUE these medications which have NOT CHANGED    Details   ondansetron (ZOFRAN) 8 MG tablet Take 1 tablet by mouth every 8 hours as needed for Nausea, Disp-20 tablet, R-0Normal      tamsulosin (FLOMAX) 0.4 MG capsule Take 1 capsule by mouth daily, Disp-7 capsule, R-1Normal      amLODIPine (NORVASC) 2.5 MG tablet Take 1 tablet by mouth daily, Disp-30 tablet, R-3Normal      vitamin D (ERGOCALCIFEROL) 1.25 MG (84815 UT) CAPS capsule Take 1 capsule by mouth once a week, Disp-12 capsule, R-3Normal      rosuvastatin (CRESTOR) 5 MG tablet Take 1 tablet by mouth daily, Disp-30 tablet, R-5Normal      famotidine (PEPCID) 20 MG tablet Take 1 tablet by mouth 2 times daily, Disp-80 tablet, R-0Print      pantoprazole (PROTONIX) 40 MG tablet 2 times daily Historical Med      DULoxetine HCl (CYMBALTA PO) Take 90 mg by mouth daily      amitriptyline (ELAVIL) 100 MG tablet Take 100 mg by mouth nightly as needed Historical Med             Allergies     She is allergic to rocephin [ceftriaxone]. Physical Exam     INITIAL VITALS: BP: (!) 159/88, Temp: 99 °F (37.2 °C), Pulse: 81, Resp: 18, SpO2: 100 %   Physical Exam  Vitals and nursing note reviewed. Constitutional:       General: She is not in acute distress. Appearance: She is obese. HENT:      Head: Normocephalic and atraumatic. Mouth/Throat:      Mouth: Mucous membranes are moist.      Pharynx: No oropharyngeal exudate. Eyes:      General: No scleral icterus. Extraocular Movements: Extraocular movements intact. Conjunctiva/sclera: Conjunctivae normal.      Pupils: Pupils are equal, round, and reactive to light. Neck:      Comments: Decreased range of motion secondary to pain. Patient has pain predominantly in the left paraspinal region with minimal midline tenderness at approximately C6 and C7. There are no step-offs noted. Musculoskeletal:         General: No swelling. Normal range of motion. Cervical back: Spinous process tenderness and muscular tenderness present. Thoracic back: Tenderness and bony tenderness present. Lumbar back: Tenderness and bony tenderness present. Comments: Tenderness to palpation in the mid thoracic spine and lower lumbar spine with no midline step-offs noted. Patient does have predominately left paraspinal tenderness present. Skin:     General: Skin is warm and dry. Neurological:      General: No focal deficit present. Mental Status: She is alert and oriented to person, place, and time. Cranial Nerves: No cranial nerve deficit. Motor: No weakness. Coordination: Coordination normal.         Diagnostic Results     RADIOLOGY:  CT CERVICAL SPINE WO CONTRAST   Final Result      1. No acute intracranial process. 2.  No evidence of acute fracture in the cervical spine. CT HEAD WO CONTRAST   Final Result      1. No acute intracranial process.    2.  No evidence of acute fracture in the cervical spine. XR THORACIC SPINE (2 VIEWS)   Final Result   Impression:    No acute osseous injury. Mild thoracic spondylosis. XR LUMBAR SPINE (2-3 VIEWS)   Final Result   Impression:    No acute osseous injury. No significant spondylosis. RECENT VITALS:  BP: (!) 159/88,Temp: 99 °F (37.2 °C), Pulse: 81, Resp: 18, SpO2: 100 %     Procedures     N/A    ED Course     Nursing Notes, Past Medical Hx, Past Surgical Hx, Social Hx,Allergies, and Family Hx were reviewed. patient was given the following medications:  Orders Placed This Encounter   Medications    ketorolac (TORADOL) injection 30 mg    indomethacin (INDOCIN) 25 MG capsule     Sig: Take 1 capsule by mouth 3 times daily as needed for Pain     Dispense:  20 capsule     Refill:  0    DISCONTD: cyclobenzaprine (FLEXERIL) 10 MG tablet     Sig: Take 1 tablet by mouth 3 times daily as needed for Muscle spasms     Dispense:  15 tablet     Refill:  0    cyclobenzaprine (FLEXERIL) 10 MG tablet     Sig: Take 1 tablet by mouth 3 times daily as needed for Muscle spasms     Dispense:  15 tablet     Refill:  0       CONSULTS:  None    MEDICAL DECISIONMAKING / ASSESSMENT / Mari Armida is a 49 y.o. female presents to the emergency department complaining of neck pain, back pain, headache, and dizziness after a fall yesterday. Patient has reproducible tenderness predominantly in the left paraspinal neck and right paraspinal lumbar spine. She has no focal neurologic deficits. CT scan of the head shows no acute intracranial abnormalities. CT scan cervical spine shows arthritic changes but no acute fracture. X-rays of the thoracic and lumbar spine show mild arthritic changes but no fracture. I feel most likely patient symptoms are secondary to either strain or contusion. She has no bony abnormalities on imaging.  She has no neurologic deficits to be concern for cauda equina syndrome so I do not feel emergent MRI of

## 2021-07-12 DIAGNOSIS — R10.9 RIGHT FLANK PAIN: Primary | ICD-10-CM

## 2021-07-13 ENCOUNTER — TELEPHONE (OUTPATIENT)
Dept: INTERNAL MEDICINE CLINIC | Age: 51
End: 2021-07-13

## 2021-07-13 RX ORDER — TRAMADOL HYDROCHLORIDE 50 MG/1
50 TABLET ORAL 2 TIMES DAILY
Qty: 15 TABLET | Refills: 0 | Status: SHIPPED | OUTPATIENT
Start: 2021-07-13 | End: 2021-07-20

## 2021-07-13 NOTE — TELEPHONE ENCOUNTER
Patient called into office to request the Tramadol (Tramadol 50 mg #15 1 tablet BID) be filled at her pharmacy listed      [facility] [address] [place], [address]    Please advise

## 2021-07-23 ENCOUNTER — HOSPITAL ENCOUNTER (EMERGENCY)
Age: 51
Discharge: HOME | End: 2021-07-23
Attending: EMERGENCY MEDICINE
Payer: COMMERCIAL

## 2021-07-23 ENCOUNTER — APPOINTMENT (OUTPATIENT)
Dept: CT IMAGING | Age: 51
End: 2021-07-23
Payer: COMMERCIAL

## 2021-07-23 VITALS
DIASTOLIC BLOOD PRESSURE: 85 MMHG | SYSTOLIC BLOOD PRESSURE: 125 MMHG | HEART RATE: 82 BPM | RESPIRATION RATE: 16 BRPM | TEMPERATURE: 97.8 F | OXYGEN SATURATION: 100 %

## 2021-07-23 DIAGNOSIS — N20.1 URETEROLITHIASIS: Primary | ICD-10-CM

## 2021-07-23 LAB
ANION GAP SERPL CALCULATED.3IONS-SCNC: 10 MMOL/L (ref 3–16)
BASOPHILS ABSOLUTE: 0.1 K/UL (ref 0–0.2)
BASOPHILS RELATIVE PERCENT: 1.2 %
BILIRUBIN URINE: NEGATIVE
BLOOD, URINE: NEGATIVE
BUN BLDV-MCNC: 14 MG/DL (ref 7–20)
CALCIUM SERPL-MCNC: 9.7 MG/DL (ref 8.3–10.6)
CHLORIDE BLD-SCNC: 104 MMOL/L (ref 99–110)
CLARITY: CLEAR
CO2: 25 MMOL/L (ref 21–32)
COLOR: YELLOW
CREAT SERPL-MCNC: 0.9 MG/DL (ref 0.6–1.1)
EOSINOPHILS ABSOLUTE: 0.3 K/UL (ref 0–0.6)
EOSINOPHILS RELATIVE PERCENT: 4.1 %
GFR AFRICAN AMERICAN: >60
GFR NON-AFRICAN AMERICAN: >60
GLUCOSE BLD-MCNC: 102 MG/DL (ref 70–99)
GLUCOSE URINE: NEGATIVE MG/DL
HCT VFR BLD CALC: 41.9 % (ref 36–48)
HEMOGLOBIN: 14.2 G/DL (ref 12–16)
KETONES, URINE: NEGATIVE MG/DL
LEUKOCYTE ESTERASE, URINE: NEGATIVE
LYMPHOCYTES ABSOLUTE: 2.7 K/UL (ref 1–5.1)
LYMPHOCYTES RELATIVE PERCENT: 33.7 %
MCH RBC QN AUTO: 28.7 PG (ref 26–34)
MCHC RBC AUTO-ENTMCNC: 33.8 G/DL (ref 31–36)
MCV RBC AUTO: 85 FL (ref 80–100)
MICROSCOPIC EXAMINATION: NORMAL
MONOCYTES ABSOLUTE: 0.7 K/UL (ref 0–1.3)
MONOCYTES RELATIVE PERCENT: 9.3 %
NEUTROPHILS ABSOLUTE: 4.1 K/UL (ref 1.7–7.7)
NEUTROPHILS RELATIVE PERCENT: 51.7 %
NITRITE, URINE: NEGATIVE
PDW BLD-RTO: 14.3 % (ref 12.4–15.4)
PH UA: 6 (ref 5–8)
PLATELET # BLD: 276 K/UL (ref 135–450)
PMV BLD AUTO: 8.1 FL (ref 5–10.5)
POTASSIUM REFLEX MAGNESIUM: 4.3 MMOL/L (ref 3.5–5.1)
PROTEIN UA: NEGATIVE MG/DL
RBC # BLD: 4.93 M/UL (ref 4–5.2)
SODIUM BLD-SCNC: 139 MMOL/L (ref 136–145)
SPECIFIC GRAVITY UA: 1.01 (ref 1–1.03)
URINE REFLEX TO CULTURE: NORMAL
URINE TYPE: NORMAL
UROBILINOGEN, URINE: 1 E.U./DL
WBC # BLD: 8 K/UL (ref 4–11)

## 2021-07-23 PROCEDURE — 2580000003 HC RX 258: Performed by: EMERGENCY MEDICINE

## 2021-07-23 PROCEDURE — 6360000002 HC RX W HCPCS: Performed by: EMERGENCY MEDICINE

## 2021-07-23 PROCEDURE — 80048 BASIC METABOLIC PNL TOTAL CA: CPT

## 2021-07-23 PROCEDURE — 99284 EMERGENCY DEPT VISIT MOD MDM: CPT

## 2021-07-23 PROCEDURE — 81003 URINALYSIS AUTO W/O SCOPE: CPT

## 2021-07-23 PROCEDURE — 74176 CT ABD & PELVIS W/O CONTRAST: CPT

## 2021-07-23 PROCEDURE — 85025 COMPLETE CBC W/AUTO DIFF WBC: CPT

## 2021-07-23 PROCEDURE — 36415 COLL VENOUS BLD VENIPUNCTURE: CPT

## 2021-07-23 PROCEDURE — 6370000000 HC RX 637 (ALT 250 FOR IP): Performed by: EMERGENCY MEDICINE

## 2021-07-23 PROCEDURE — 96375 TX/PRO/DX INJ NEW DRUG ADDON: CPT

## 2021-07-23 PROCEDURE — 96374 THER/PROPH/DIAG INJ IV PUSH: CPT

## 2021-07-23 RX ORDER — PROMETHAZINE HYDROCHLORIDE 25 MG/ML
12.5 INJECTION, SOLUTION INTRAMUSCULAR; INTRAVENOUS ONCE
Status: COMPLETED | OUTPATIENT
Start: 2021-07-23 | End: 2021-07-23

## 2021-07-23 RX ORDER — PROMETHAZINE HYDROCHLORIDE 25 MG/1
25 TABLET ORAL EVERY 6 HOURS PRN
Qty: 20 TABLET | Refills: 0 | Status: SHIPPED | OUTPATIENT
Start: 2021-07-23 | End: 2021-07-30

## 2021-07-23 RX ORDER — TAMSULOSIN HYDROCHLORIDE 0.4 MG/1
0.4 CAPSULE ORAL DAILY
Qty: 7 CAPSULE | Refills: 0 | Status: SHIPPED | OUTPATIENT
Start: 2021-07-23 | End: 2021-12-01 | Stop reason: CLARIF

## 2021-07-23 RX ORDER — OXYCODONE HYDROCHLORIDE AND ACETAMINOPHEN 5; 325 MG/1; MG/1
1 TABLET ORAL EVERY 6 HOURS PRN
Qty: 12 TABLET | Refills: 0 | Status: SHIPPED | OUTPATIENT
Start: 2021-07-23 | End: 2022-02-08 | Stop reason: SDUPTHER

## 2021-07-23 RX ORDER — SODIUM CHLORIDE, SODIUM LACTATE, POTASSIUM CHLORIDE, CALCIUM CHLORIDE 600; 310; 30; 20 MG/100ML; MG/100ML; MG/100ML; MG/100ML
1000 INJECTION, SOLUTION INTRAVENOUS ONCE
Status: COMPLETED | OUTPATIENT
Start: 2021-07-23 | End: 2021-07-23

## 2021-07-23 RX ORDER — KETOROLAC TROMETHAMINE 30 MG/ML
15 INJECTION, SOLUTION INTRAMUSCULAR; INTRAVENOUS ONCE
Status: COMPLETED | OUTPATIENT
Start: 2021-07-23 | End: 2021-07-23

## 2021-07-23 RX ORDER — OXYCODONE HYDROCHLORIDE AND ACETAMINOPHEN 5; 325 MG/1; MG/1
1 TABLET ORAL ONCE
Status: COMPLETED | OUTPATIENT
Start: 2021-07-23 | End: 2021-07-23

## 2021-07-23 RX ORDER — FENTANYL CITRATE 50 UG/ML
50 INJECTION, SOLUTION INTRAMUSCULAR; INTRAVENOUS ONCE
Status: COMPLETED | OUTPATIENT
Start: 2021-07-23 | End: 2021-07-23

## 2021-07-23 RX ADMIN — PROMETHAZINE HYDROCHLORIDE 12.5 MG: 25 INJECTION INTRAMUSCULAR; INTRAVENOUS at 11:05

## 2021-07-23 RX ADMIN — HYDROMORPHONE HYDROCHLORIDE 0.5 MG: 1 INJECTION, SOLUTION INTRAMUSCULAR; INTRAVENOUS; SUBCUTANEOUS at 11:04

## 2021-07-23 RX ADMIN — OXYCODONE HYDROCHLORIDE AND ACETAMINOPHEN 1 TABLET: 5; 325 TABLET ORAL at 13:55

## 2021-07-23 RX ADMIN — FENTANYL CITRATE 50 MCG: 50 INJECTION INTRAMUSCULAR; INTRAVENOUS at 13:03

## 2021-07-23 RX ADMIN — KETOROLAC TROMETHAMINE 15 MG: 30 INJECTION, SOLUTION INTRAMUSCULAR at 11:04

## 2021-07-23 RX ADMIN — SODIUM CHLORIDE, SODIUM LACTATE, POTASSIUM CHLORIDE, AND CALCIUM CHLORIDE 1000 ML: .6; .31; .03; .02 INJECTION, SOLUTION INTRAVENOUS at 11:20

## 2021-07-23 ASSESSMENT — PAIN SCALES - GENERAL
PAINLEVEL_OUTOF10: 6
PAINLEVEL_OUTOF10: 8
PAINLEVEL_OUTOF10: 10

## 2021-07-23 ASSESSMENT — PAIN DESCRIPTION - DESCRIPTORS: DESCRIPTORS: ACHING

## 2021-07-23 ASSESSMENT — PAIN DESCRIPTION - ORIENTATION: ORIENTATION: RIGHT

## 2021-07-23 ASSESSMENT — PAIN DESCRIPTION - FREQUENCY: FREQUENCY: CONTINUOUS

## 2021-07-23 ASSESSMENT — PAIN DESCRIPTION - LOCATION: LOCATION: FLANK

## 2021-07-23 ASSESSMENT — PAIN DESCRIPTION - PAIN TYPE: TYPE: ACUTE PAIN

## 2021-07-23 NOTE — ED PROVIDER NOTES
[address] [facility]          ATTENDING PHYSICIAN NOTE       Date of evaluation: 7/23/2021    Chief Complaint     Flank Pain (right flank pain since 5pm, hx of kidney stones, last CT showed 6mm stone)      History of Present Illness     Angie Aalniz is a 49 y.o. female with history of kidney stones, fibromyalgia, Villela's esophagus presenting for right flank pain. States symptoms feel very similar to prior kidney stones and started in her right flank approximately 5 PM yesterday evening. The pain is migrated somewhat down into her right lower quadrant. She has no dysuria but is concerned about possible urinary infection. Denies any fevers, has had some nausea without vomiting, no changes in bowel movements. Has had multiple prior abdominal surgeries. Review of Systems     Pertinent positive and negative findings as documented in the HPI. Otherwise all other systems were reviewed and were negative. Physical Exam     INITIAL VITALS: BP: (!) 153/73, Temp: 97.8 °F (36.6 °C), Pulse: 97, Resp: 24, SpO2: 99 %     Nursing note and vitals reviewed. General:  Adult female, alert and appropriately interactive. In mild distress. HENT: Normocephalic and atraumatic. External ears normal. Nose appears normal externally. Eyes: Conjunctivae normal. No scleral icterus. Neck: Neck supple. No tracheal deviation present. CV: Normal rate. Regular rhythm. S1/S2 auscultated. No murmurs, gallops or rubs. Pulm: Effort normal. Breath sounds clear to auscultation bilaterally. No wheezes. No rales or rhonchi. GI: Soft. No distension. Mild R flank tenderness. No rebound or guarding. No masses. No peritoneal signs. Musculoskeletal: No edema. No gross deformities. Neurological: Alert and appropriately interactive. Face symmetric, speech without dysarthria or obvious aphasia. Moving all extremities spontaneously. Skin: Warm, dry. No rash. No diaphoresis or erythema.   Psychiatric: Calm other family member; Lorraine López in her maternal grandfather; Diabetes type 2  in her maternal grandfather; Heart Disease in her maternal grandmother and paternal grandmother; Hypertension in her maternal grandfather; Kidney stones in her father; Other in her maternal grandmother; Ovarian Cancer in her paternal grandmother. She reports that she has never smoked. She has never used smokeless tobacco. She reports that she does not drink alcohol and does not use drugs. Medications     Discharge Medication List as of 7/23/2021  2:01 PM      CONTINUE these medications which have NOT CHANGED    Details   indomethacin (INDOCIN) 25 MG capsule Take 1 capsule by mouth 3 times daily as needed for Pain, Disp-20 capsule, R-0Print      cyclobenzaprine (FLEXERIL) 10 MG tablet Take 1 tablet by mouth 3 times daily as needed for Muscle spasms, Disp-15 tablet, R-0Print      ondansetron (ZOFRAN) 8 MG tablet Take 1 tablet by mouth every 8 hours as needed for Nausea, Disp-20 tablet, R-0Normal      amLODIPine (NORVASC) 2.5 MG tablet Take 1 tablet by mouth daily, Disp-30 tablet, R-3Normal      vitamin D (ERGOCALCIFEROL) 1.25 MG (29558 UT) CAPS capsule Take 1 capsule by mouth once a week, Disp-12 capsule, R-3Normal      rosuvastatin (CRESTOR) 5 MG tablet Take 1 tablet by mouth daily, Disp-30 tablet, R-5Normal      famotidine (PEPCID) 20 MG tablet Take 1 tablet by mouth 2 times daily, Disp-80 tablet, R-0Print      pantoprazole (PROTONIX) 40 MG tablet 2 times daily Historical Med      DULoxetine HCl (CYMBALTA PO) Take 90 mg by mouth daily      amitriptyline (ELAVIL) 100 MG tablet Take 100 mg by mouth nightly as needed Historical Med             Allergies     She is allergic to rocephin [ceftriaxone]. ED Course     Nursing Notes, Past Medical Hx, Past Surgical Hx, Social Hx,Allergies, and Family Hx were reviewed.     Patient was given the following medications:  Orders Placed This Encounter   Medications    lactated ringers infusion 1,000 mL    promethazine (PHENERGAN) injection 12.5 mg    HYDROmorphone (DILAUDID) injection 0.5 mg    ketorolac (TORADOL) injection 15 mg    fentaNYL (SUBLIMAZE) injection 50 mcg    oxyCODONE-acetaminophen (PERCOCET) 5-325 MG per tablet 1 tablet    oxyCODONE-acetaminophen (PERCOCET) 5-325 MG per tablet     Sig: Take 1 tablet by mouth every 6 hours as needed for Pain for up to 3 days. Intended supply: 3 days. Take lowest dose possible to manage pain     Dispense:  12 tablet     Refill:  0    promethazine (PHENERGAN) 25 MG tablet     Sig: Take 1 tablet by mouth every 6 hours as needed for Nausea WARNING:  May cause drowsiness. May impair ability to operate vehicles or machinery. Do not use in combination with alcohol. Dispense:  20 tablet     Refill:  0    tamsulosin (FLOMAX) 0.4 MG capsule     Sig: Take 1 capsule by mouth daily for 7 doses     Dispense:  7 capsule     Refill:  0       Diagnostic Results       RECENT VITALS:  BP: 125/85,Temp: 97.8 °F (36.6 °C), Pulse: 82, Resp: 16, SpO2: 100 %     RADIOLOGY:  CT ABDOMEN PELVIS WO CONTRAST Additional Contrast? None   Final Result   1.  3 mm calculus of the distal right ureter. 2. Hepatic steatosis.              LABS:   Results for orders placed or performed during the hospital encounter of 07/23/21   CBC Auto Differential   Result Value Ref Range    WBC 8.0 4.0 - 11.0 K/uL    RBC 4.93 4.00 - 5.20 M/uL    Hemoglobin 14.2 12.0 - 16.0 g/dL    Hematocrit 41.9 36.0 - 48.0 %    MCV 85.0 80.0 - 100.0 fL    MCH 28.7 26.0 - 34.0 pg    MCHC 33.8 31.0 - 36.0 g/dL    RDW 14.3 12.4 - 15.4 %    Platelets 653 578 - 135 K/uL    MPV 8.1 5.0 - 10.5 fL    Neutrophils % 51.7 %    Lymphocytes % 33.7 %    Monocytes % 9.3 %    Eosinophils % 4.1 %    Basophils % 1.2 %    Neutrophils Absolute 4.1 1.7 - 7.7 K/uL    Lymphocytes Absolute 2.7 1.0 - 5.1 K/uL    Monocytes Absolute 0.7 0.0 - 1.3 K/uL    Eosinophils Absolute 0.3 0.0 - 0.6 K/uL    Basophils Absolute 0.1 0.0 - 0.2 K/uL Basic Metabolic Panel w/ Reflex to MG   Result Value Ref Range    Sodium 139 136 - 145 mmol/L    Potassium reflex Magnesium 4.3 3.5 - 5.1 mmol/L    Chloride 104 99 - 110 mmol/L    CO2 25 21 - 32 mmol/L    Anion Gap 10 3 - 16    Glucose 102 (H) 70 - 99 mg/dL    BUN 14 7 - 20 mg/dL    CREATININE 0.9 0.6 - 1.1 mg/dL    GFR Non-African American >60 >60    GFR African American >60 >60    Calcium 9.7 8.3 - 10.6 mg/dL   Urinalysis Reflex to Culture    Specimen: Urine, clean catch   Result Value Ref Range    Color, UA Yellow Straw/Yellow    Clarity, UA Clear Clear    Glucose, Ur Negative Negative mg/dL    Bilirubin Urine Negative Negative    Ketones, Urine Negative Negative mg/dL    Specific Gravity, UA 1.015 1.005 - 1.030    Blood, Urine Negative Negative    pH, UA 6.0 5.0 - 8.0    Protein, UA Negative Negative mg/dL    Urobilinogen, Urine 1.0 <2.0 E.U./dL    Nitrite, Urine Negative Negative    Leukocyte Esterase, Urine Negative Negative    Microscopic Examination Not Indicated     Urine Type NotGiven     Urine Reflex to Culture Not Indicated        CONSULTS:  None    PATIENT REFERRED TO:  [facility] Emergency Department  [address]  [phone]    If symptoms worsen      DISCHARGE MEDICATIONS:  Discharge Medication List as of 7/23/2021  2:01 PM      START taking these medications    Details   oxyCODONE-acetaminophen (PERCOCET) 5-325 MG per tablet Take 1 tablet by mouth every 6 hours as needed for Pain for up to 3 days. Intended supply: 3 days. Take lowest dose possible to manage pain, Disp-12 tablet, R-0Print      promethazine (PHENERGAN) 25 MG tablet Take 1 tablet by mouth every 6 hours as needed for Nausea WARNING:  May cause drowsiness. May impair ability to operate vehicles or machinery.   Do not use in combination with alcohol., Disp-20 tablet, R-0Print              Austin Meckel, MD  07/23/21 7141

## 2021-09-23 ENCOUNTER — PATIENT MESSAGE (OUTPATIENT)
Dept: INTERNAL MEDICINE CLINIC | Age: 51
End: 2021-09-23

## 2021-09-23 ENCOUNTER — TELEPHONE (OUTPATIENT)
Dept: INTERNAL MEDICINE CLINIC | Age: 51
End: 2021-09-23

## 2021-09-23 RX ORDER — CYCLOBENZAPRINE HCL 10 MG
10 TABLET ORAL 3 TIMES DAILY PRN
Qty: 15 TABLET | Refills: 0 | Status: SHIPPED | OUTPATIENT
Start: 2021-09-23 | End: 2021-11-15 | Stop reason: SDUPTHER

## 2021-09-23 NOTE — TELEPHONE ENCOUNTER
Non-Urgent Medical Question    Violeta Cho MD 11 minutes ago (10:04 AM)   TW  Hello  I am having bad fibromyalgia flare up, I am out of my muscle relaxer.  Could I please have a refill on it  Thanks

## 2021-11-15 RX ORDER — CYCLOBENZAPRINE HCL 10 MG
10 TABLET ORAL 3 TIMES DAILY PRN
Qty: 15 TABLET | Refills: 0 | Status: SHIPPED | OUTPATIENT
Start: 2021-11-15 | End: 2022-04-01 | Stop reason: SDUPTHER

## 2021-12-01 ENCOUNTER — VIRTUAL VISIT (OUTPATIENT)
Dept: INTERNAL MEDICINE CLINIC | Age: 51
End: 2021-12-01
Payer: COMMERCIAL

## 2021-12-01 DIAGNOSIS — J20.9 ACUTE BRONCHITIS, UNSPECIFIED ORGANISM: Primary | ICD-10-CM

## 2021-12-01 PROCEDURE — 99213 OFFICE O/P EST LOW 20 MIN: CPT | Performed by: HOSPITALIST

## 2021-12-01 PROCEDURE — G8427 DOCREV CUR MEDS BY ELIG CLIN: HCPCS | Performed by: HOSPITALIST

## 2021-12-01 PROCEDURE — 3017F COLORECTAL CA SCREEN DOC REV: CPT | Performed by: HOSPITALIST

## 2021-12-01 RX ORDER — AMOXICILLIN AND CLAVULANATE POTASSIUM 875; 125 MG/1; MG/1
1 TABLET, FILM COATED ORAL 2 TIMES DAILY
Qty: 14 TABLET | Refills: 0 | Status: SHIPPED | OUTPATIENT
Start: 2021-12-01 | End: 2021-12-08

## 2021-12-01 RX ORDER — METHYLPREDNISOLONE 4 MG/1
TABLET ORAL
Qty: 1 KIT | Refills: 0 | Status: SHIPPED | OUTPATIENT
Start: 2021-12-01 | End: 2021-12-07

## 2021-12-01 RX ORDER — GUAIFENESIN AND CODEINE PHOSPHATE 100; 10 MG/5ML; MG/5ML
10 SOLUTION ORAL 4 TIMES DAILY PRN
Qty: 180 ML | Refills: 0 | Status: SHIPPED | OUTPATIENT
Start: 2021-12-01 | End: 2021-12-08

## 2021-12-01 SDOH — ECONOMIC STABILITY: FOOD INSECURITY: WITHIN THE PAST 12 MONTHS, THE FOOD YOU BOUGHT JUST DIDN'T LAST AND YOU DIDN'T HAVE MONEY TO GET MORE.: NEVER TRUE

## 2021-12-01 SDOH — ECONOMIC STABILITY: FOOD INSECURITY: WITHIN THE PAST 12 MONTHS, YOU WORRIED THAT YOUR FOOD WOULD RUN OUT BEFORE YOU GOT MONEY TO BUY MORE.: NEVER TRUE

## 2021-12-01 ASSESSMENT — SOCIAL DETERMINANTS OF HEALTH (SDOH): HOW HARD IS IT FOR YOU TO PAY FOR THE VERY BASICS LIKE FOOD, HOUSING, MEDICAL CARE, AND HEATING?: NOT HARD AT ALL

## 2021-12-01 NOTE — PROGRESS NOTES
2021    TELEHEALTH EVALUATION -- Audio/Visual (During KZGRM-52 public health emergency)    HPI:    Sandy Koch (:  1970) has requested an audio/video evaluation for the following concern(s):    Mckenzie reports sinus congestion and postnasal drainage which started about 5 days ago. She also reports some chest congestion and chest tightness in the last 2-3 days. She also has some cough productive of small amount of yellowish sputum. Coughing spells are frequent and interfering with her ability to sleep. She does not report fever nor chills. No chest pain. No abdominal pain. No nausea/vomiting. No dysuria    Review of Systems    Prior to Visit Medications    Medication Sig Taking? Authorizing Provider   methylPREDNISolone (MEDROL, LOUIE,) 4 MG tablet Take by mouth. Yes Delia Aguilar MD   amoxicillin-clavulanate (AUGMENTIN) 875-125 MG per tablet Take 1 tablet by mouth 2 times daily for 7 days Yes Delia Aguilar MD   guaiFENesin-codeine (TUSSI-ORGANIDIN NR) 100-10 MG/5ML syrup Take 10 mLs by mouth 4 times daily as needed for Cough for up to 7 days.  Yes Delia Aguilar MD   cyclobenzaprine (FLEXERIL) 10 MG tablet Take 1 tablet by mouth 3 times daily as needed for Muscle spasms Yes Delia Aguilar MD   indomethacin (INDOCIN) 25 MG capsule Take 1 capsule by mouth 3 times daily as needed for Pain Yes Gato Benjamin MD   ondansetron (ZOFRAN) 8 MG tablet Take 1 tablet by mouth every 8 hours as needed for Nausea Yes Ronel Rosado MD   amLODIPine (NORVASC) 2.5 MG tablet Take 1 tablet by mouth daily Yes Delia Aguilar MD   vitamin D (ERGOCALCIFEROL) 1.25 MG (01189 UT) CAPS capsule Take 1 capsule by mouth once a week Yes Delia Aguilar MD   rosuvastatin (CRESTOR) 5 MG tablet Take 1 tablet by mouth daily Yes Delia Aguilar MD   pantoprazole (PROTONIX) 40 MG tablet 2 times daily  Yes Historical Provider, MD   amitriptyline (ELAVIL) 100 MG tablet Take 100 mg by mouth nightly as needed  Yes Historical Provider, MD Social History     Tobacco Use    Smoking status: Never Smoker    Smokeless tobacco: Never Used   Vaping Use    Vaping Use: Never used   Substance Use Topics    Alcohol use: No    Drug use: No          PHYSICAL EXAMINATION:  [ INSTRUCTIONS:  \"[x]\" Indicates a positive item  \"[]\" Indicates a negative item  -- DELETE ALL ITEMS NOT EXAMINED]  Vital Signs: (As obtained by patient/caregiver or practitioner observation)    Blood pressure-  Heart rate-    Respiratory rate-    Temperature-  Pulse oximetry-     Constitutional: [x] Appears well-developed and well-nourished [x] No apparent distress      [] Abnormal-   Mental status  [x] Alert and awake  [x] Oriented to person/place/time [x]Able to follow commands      Eyes:  EOM    [x]  Normal  [] Abnormal-  Sclera  [x]  Normal  [] Abnormal -         Discharge []  None visible  [] Abnormal -    HENT:   [x] Normocephalic, atraumatic. [] Abnormal   [x] Mouth/Throat: Mucous membranes are moist.     External Ears [x] Normal  [] Abnormal-     Neck: [x] No visualized mass     Pulmonary/Chest: [x] Respiratory effort normal.  [x] No visualized signs of difficulty breathing or respiratory distress        [] Abnormal-      Musculoskeletal:   [x] Normal gait with no signs of ataxia         [x] Normal range of motion of neck        [] Abnormal-       Neurological:        [x] No Facial Asymmetry (Cranial nerve 7 motor function) (limited exam to video visit)          [x] No gaze palsy        [] Abnormal-         Skin:        [x] No significant exanthematous lesions or discoloration noted on facial skin         [] Abnormal-            Psychiatric:       [x] Normal Affect [] No Hallucinations        [] Abnormal-     Other pertinent observable physical exam findings-     ASSESSMENT/PLAN:  1. Acute bronchitis, unspecified organism  - methylPREDNISolone (MEDROL, LOUIE,) 4 MG tablet; Take by mouth. Dispense: 1 kit;  Refill: 0  - amoxicillin-clavulanate (AUGMENTIN) 875-125 MG per tablet; Take 1 tablet by mouth 2 times daily for 7 days  Dispense: 14 tablet; Refill: 0  - guaiFENesin-codeine (TUSSI-ORGANIDIN NR) 100-10 MG/5ML syrup; Take 10 mLs by mouth 4 times daily as needed for Cough for up to 7 days. Dispense: 180 mL; Refill: 0  -Supportive care      Return if symptoms worsen or fail to improve. Angie Desir, was evaluated through a synchronous (real-time) audio-video encounter. The patient (or guardian if applicable) is aware that this is a billable service. Verbal consent to proceed has been obtained within the past 12 months. The visit was conducted pursuant to the emergency declaration under the [address] [place] [address] authority and the [employer] and [employer] Act. Patient identification was verified, and a caregiver was present when appropriate. The patient was located in a state where the provider was credentialed to provide care. Total time spent on this encounter: Not billed by time    --Steven Beauchamp MD on 12/1/2021 at 5:08 PM    An electronic signature was used to authenticate this note.

## 2021-12-15 ENCOUNTER — CLINICAL DOCUMENTATION (OUTPATIENT)
Dept: OTHER | Age: 51
End: 2021-12-15

## 2022-02-08 ENCOUNTER — TELEPHONE (OUTPATIENT)
Dept: INTERNAL MEDICINE CLINIC | Age: 52
End: 2022-02-08

## 2022-02-08 DIAGNOSIS — N20.1 URETEROLITHIASIS: ICD-10-CM

## 2022-02-08 RX ORDER — OXYCODONE HYDROCHLORIDE AND ACETAMINOPHEN 5; 325 MG/1; MG/1
1 TABLET ORAL EVERY 6 HOURS PRN
Qty: 12 TABLET | Refills: 0 | Status: SHIPPED | OUTPATIENT
Start: 2022-02-08 | End: 2022-02-11

## 2022-02-08 NOTE — TELEPHONE ENCOUNTER
Kidney stone     Sumeet Marrero MD 34 minutes ago (11:06 AM)     TW      I am passing a kidney stone it started last night would you be able to send me in pain medicine   Thanks

## 2022-02-09 ENCOUNTER — HOSPITAL ENCOUNTER (EMERGENCY)
Age: 52
Discharge: HOME | End: 2022-02-09
Attending: EMERGENCY MEDICINE
Payer: COMMERCIAL

## 2022-02-09 VITALS
SYSTOLIC BLOOD PRESSURE: 135 MMHG | WEIGHT: 260 LBS | OXYGEN SATURATION: 96 % | DIASTOLIC BLOOD PRESSURE: 60 MMHG | HEART RATE: 80 BPM | TEMPERATURE: 97.7 F | BODY MASS INDEX: 43.32 KG/M2 | HEIGHT: 65 IN | RESPIRATION RATE: 16 BRPM

## 2022-02-09 DIAGNOSIS — Z87.442 HISTORY OF NEPHROLITHIASIS: ICD-10-CM

## 2022-02-09 DIAGNOSIS — R10.9 FLANK PAIN: Primary | ICD-10-CM

## 2022-02-09 LAB
ALBUMIN SERPL-MCNC: 3.9 G/DL (ref 3.4–5)
ALP BLD-CCNC: 61 U/L (ref 40–129)
ALT SERPL-CCNC: 64 U/L (ref 10–40)
ANION GAP SERPL CALCULATED.3IONS-SCNC: 9 MMOL/L (ref 3–16)
AST SERPL-CCNC: 51 U/L (ref 15–37)
BASOPHILS ABSOLUTE: 0.1 K/UL (ref 0–0.2)
BASOPHILS RELATIVE PERCENT: 1.4 %
BILIRUB SERPL-MCNC: 0.6 MG/DL (ref 0–1)
BILIRUBIN DIRECT: <0.2 MG/DL (ref 0–0.3)
BILIRUBIN URINE: NEGATIVE
BILIRUBIN, INDIRECT: ABNORMAL MG/DL (ref 0–1)
BLOOD, URINE: NEGATIVE
BUN BLDV-MCNC: 12 MG/DL (ref 7–20)
CALCIUM SERPL-MCNC: 9.7 MG/DL (ref 8.3–10.6)
CHLORIDE BLD-SCNC: 105 MMOL/L (ref 99–110)
CLARITY: CLEAR
CO2: 25 MMOL/L (ref 21–32)
COLOR: YELLOW
CREAT SERPL-MCNC: 0.8 MG/DL (ref 0.6–1.1)
EOSINOPHILS ABSOLUTE: 0.4 K/UL (ref 0–0.6)
EOSINOPHILS RELATIVE PERCENT: 5.1 %
GFR AFRICAN AMERICAN: >60
GFR NON-AFRICAN AMERICAN: >60
GLUCOSE BLD-MCNC: 96 MG/DL (ref 70–99)
GLUCOSE URINE: NEGATIVE MG/DL
HCT VFR BLD CALC: 40.8 % (ref 36–48)
HEMOGLOBIN: 13.6 G/DL (ref 12–16)
KETONES, URINE: NEGATIVE MG/DL
LEUKOCYTE ESTERASE, URINE: NEGATIVE
LYMPHOCYTES ABSOLUTE: 2.5 K/UL (ref 1–5.1)
LYMPHOCYTES RELATIVE PERCENT: 32.6 %
MCH RBC QN AUTO: 28.9 PG (ref 26–34)
MCHC RBC AUTO-ENTMCNC: 33.2 G/DL (ref 31–36)
MCV RBC AUTO: 86.9 FL (ref 80–100)
MICROSCOPIC EXAMINATION: NORMAL
MONOCYTES ABSOLUTE: 0.6 K/UL (ref 0–1.3)
MONOCYTES RELATIVE PERCENT: 7.7 %
NEUTROPHILS ABSOLUTE: 4.1 K/UL (ref 1.7–7.7)
NEUTROPHILS RELATIVE PERCENT: 53.2 %
NITRITE, URINE: NEGATIVE
PDW BLD-RTO: 14.5 % (ref 12.4–15.4)
PH UA: 7.5 (ref 5–8)
PLATELET # BLD: 307 K/UL (ref 135–450)
PMV BLD AUTO: 7.7 FL (ref 5–10.5)
POTASSIUM REFLEX MAGNESIUM: 4.9 MMOL/L (ref 3.5–5.1)
PROTEIN UA: NEGATIVE MG/DL
RBC # BLD: 4.69 M/UL (ref 4–5.2)
SODIUM BLD-SCNC: 139 MMOL/L (ref 136–145)
SPECIFIC GRAVITY UA: 1.02 (ref 1–1.03)
TOTAL PROTEIN: 7.2 G/DL (ref 6.4–8.2)
URINE REFLEX TO CULTURE: NORMAL
URINE TYPE: NORMAL
UROBILINOGEN, URINE: 0.2 E.U./DL
WBC # BLD: 7.7 K/UL (ref 4–11)

## 2022-02-09 PROCEDURE — 85025 COMPLETE CBC W/AUTO DIFF WBC: CPT

## 2022-02-09 PROCEDURE — 80048 BASIC METABOLIC PNL TOTAL CA: CPT

## 2022-02-09 PROCEDURE — 81003 URINALYSIS AUTO W/O SCOPE: CPT

## 2022-02-09 PROCEDURE — 80076 HEPATIC FUNCTION PANEL: CPT

## 2022-02-09 PROCEDURE — 96374 THER/PROPH/DIAG INJ IV PUSH: CPT

## 2022-02-09 PROCEDURE — 99283 EMERGENCY DEPT VISIT LOW MDM: CPT

## 2022-02-09 PROCEDURE — 6360000002 HC RX W HCPCS: Performed by: PHYSICIAN ASSISTANT

## 2022-02-09 PROCEDURE — 96375 TX/PRO/DX INJ NEW DRUG ADDON: CPT

## 2022-02-09 PROCEDURE — 2580000003 HC RX 258: Performed by: PHYSICIAN ASSISTANT

## 2022-02-09 RX ORDER — PROMETHAZINE HYDROCHLORIDE 25 MG/ML
12.5 INJECTION, SOLUTION INTRAMUSCULAR; INTRAVENOUS ONCE
Status: COMPLETED | OUTPATIENT
Start: 2022-02-09 | End: 2022-02-09

## 2022-02-09 RX ORDER — KETOROLAC TROMETHAMINE 30 MG/ML
15 INJECTION, SOLUTION INTRAMUSCULAR; INTRAVENOUS ONCE
Status: COMPLETED | OUTPATIENT
Start: 2022-02-09 | End: 2022-02-09

## 2022-02-09 RX ORDER — PROMETHAZINE HYDROCHLORIDE 12.5 MG/1
12.5 TABLET ORAL 3 TIMES DAILY PRN
Qty: 12 TABLET | Refills: 0 | Status: SHIPPED | OUTPATIENT
Start: 2022-02-09 | End: 2022-02-16

## 2022-02-09 RX ORDER — ONDANSETRON 2 MG/ML
4 INJECTION INTRAMUSCULAR; INTRAVENOUS ONCE
Status: DISCONTINUED | OUTPATIENT
Start: 2022-02-09 | End: 2022-02-09

## 2022-02-09 RX ORDER — TAMSULOSIN HYDROCHLORIDE 0.4 MG/1
0.4 CAPSULE ORAL DAILY
Qty: 5 CAPSULE | Refills: 0 | Status: SHIPPED
Start: 2022-02-09 | End: 2022-03-09 | Stop reason: CLARIF

## 2022-02-09 RX ORDER — SODIUM CHLORIDE, SODIUM LACTATE, POTASSIUM CHLORIDE, AND CALCIUM CHLORIDE .6; .31; .03; .02 G/100ML; G/100ML; G/100ML; G/100ML
1000 INJECTION, SOLUTION INTRAVENOUS ONCE
Status: COMPLETED | OUTPATIENT
Start: 2022-02-09 | End: 2022-02-09

## 2022-02-09 RX ORDER — MORPHINE SULFATE 4 MG/ML
4 INJECTION, SOLUTION INTRAMUSCULAR; INTRAVENOUS ONCE
Status: COMPLETED | OUTPATIENT
Start: 2022-02-09 | End: 2022-02-09

## 2022-02-09 RX ADMIN — PROMETHAZINE HYDROCHLORIDE 12.5 MG: 25 INJECTION INTRAMUSCULAR; INTRAVENOUS at 11:20

## 2022-02-09 RX ADMIN — KETOROLAC TROMETHAMINE 15 MG: 30 INJECTION, SOLUTION INTRAMUSCULAR at 11:03

## 2022-02-09 RX ADMIN — MORPHINE SULFATE 4 MG: 4 INJECTION INTRAVENOUS at 13:08

## 2022-02-09 RX ADMIN — SODIUM CHLORIDE, POTASSIUM CHLORIDE, SODIUM LACTATE AND CALCIUM CHLORIDE 1000 ML: 600; 310; 30; 20 INJECTION, SOLUTION INTRAVENOUS at 11:02

## 2022-02-09 ASSESSMENT — PAIN DESCRIPTION - PAIN TYPE: TYPE: ACUTE PAIN

## 2022-02-09 ASSESSMENT — PAIN DESCRIPTION - ONSET: ONSET: AWAKENED FROM SLEEP

## 2022-02-09 ASSESSMENT — PAIN DESCRIPTION - LOCATION
LOCATION: BACK;FLANK
LOCATION: BACK;FLANK

## 2022-02-09 ASSESSMENT — ENCOUNTER SYMPTOMS
VOMITING: 0
COUGH: 0
CONSTIPATION: 0
NAUSEA: 1
SHORTNESS OF BREATH: 0
DIARRHEA: 0
ABDOMINAL PAIN: 0

## 2022-02-09 ASSESSMENT — PAIN SCALES - GENERAL
PAINLEVEL_OUTOF10: 9
PAINLEVEL_OUTOF10: 10
PAINLEVEL_OUTOF10: 9
PAINLEVEL_OUTOF10: 10

## 2022-02-09 ASSESSMENT — PAIN DESCRIPTION - ORIENTATION
ORIENTATION: RIGHT
ORIENTATION: RIGHT

## 2022-02-09 ASSESSMENT — PAIN DESCRIPTION - DESCRIPTORS: DESCRIPTORS: SHARP;PRESSURE

## 2022-02-09 ASSESSMENT — PAIN DESCRIPTION - FREQUENCY: FREQUENCY: CONTINUOUS

## 2022-02-09 NOTE — ED PROVIDER NOTES
[address] [facility] ENCOUNTER          PHYSICIAN ASSISTANT NOTE       Date of evaluation: 2/9/2022    Chief Complaint     Flank Pain (started last night, right flank)      History of Present Illness     HPI: Ligia Doctor is a 47 y.o. female with history of fibromyalgia who presents to the emergency department with right flank pain. Patient's pain started on Monday evening. She got Percocet from her family doctor, took 1 last night with no significant improvement of pain. She has had associated nausea with her pain, no episodes of emesis and has been able to keep down liquids. She is also had some polyuria. She denies any dysuria. She has a history of ureteral stent placement for her kidney stones, has never had lithotripsy or stone retrieval.  She denies any fevers or chills. Her pain does start in her right flank and wraps around to her right lower quadrant. She has a history of appendectomy and cholecystectomy. With the exception of the above, there are no aggravating or alleviating factors. Review of Systems     Review of Systems   Constitutional: Negative for chills and fever. Respiratory: Negative for cough and shortness of breath. Cardiovascular: Negative for chest pain. Gastrointestinal: Positive for nausea. Negative for abdominal pain, constipation, diarrhea and vomiting. Genitourinary: Positive for flank pain and frequency. Negative for dysuria and hematuria. Neurological: Negative for dizziness and headaches. As stated above, all other systems reviewed and are otherwise negative. Past Medical, Surgical, Family, and Social History     She has a past medical history of Villela's esophagus, Fibromyalgia, Hx of blood clots, Kidney stone, and PONV (postoperative nausea and vomiting). She has a past surgical history that includes other surgical history (10/03/2014); Appendectomy; Abdomen surgery;  Cholecystectomy; Colonoscopy; Endoscopy, colon, rosuvastatin (CRESTOR) 5 MG tablet Take 1 tablet by mouth daily, Disp-30 tablet, R-5Normal      pantoprazole (PROTONIX) 40 MG tablet 2 times daily Historical Med      amitriptyline (ELAVIL) 100 MG tablet Take 100 mg by mouth nightly as needed Historical Med             Allergies     She is allergic to rocephin [ceftriaxone]. Physical Exam     INITIAL VITALS: BP: (!) 140/87, Temp: 97.7 °F (36.5 °C), Pulse: 92, Resp: 20, SpO2: 98 %  Physical Exam  Vitals and nursing note reviewed. Constitutional:       General: She is not in acute distress. Appearance: Normal appearance. She is normal weight. She is not ill-appearing, toxic-appearing or diaphoretic. HENT:      Head: Normocephalic and atraumatic. Nose: Nose normal.      Mouth/Throat:      Mouth: Mucous membranes are moist.      Pharynx: Oropharynx is clear. Eyes:      Extraocular Movements: Extraocular movements intact. Cardiovascular:      Rate and Rhythm: Normal rate. Pulses: Normal pulses. Pulmonary:      Effort: Pulmonary effort is normal. No respiratory distress. Abdominal:      General: Abdomen is flat. Palpations: Abdomen is soft. Comments: Generalized tenderness, no focal tenderness, rebound or guarding. No distention rigidity. Positive right-sided CVA tenderness. Musculoskeletal:         General: Normal range of motion. Cervical back: Normal range of motion. Skin:     General: Skin is warm and dry. Neurological:      General: No focal deficit present. Mental Status: She is alert and oriented to person, place, and time.    Psychiatric:         Mood and Affect: Mood normal.         Behavior: Behavior normal.         Diagnostic Results     RADIOLOGY:  No orders to display       LABS:   Results for orders placed or performed during the hospital encounter of 02/09/22   Urinalysis Reflex to Culture    Specimen: Urine, clean catch   Result Value Ref Range    Color, UA Yellow Straw/Yellow    Clarity, UA Clear Clear    Glucose, Ur Negative Negative mg/dL    Bilirubin Urine Negative Negative    Ketones, Urine Negative Negative mg/dL    Specific Gravity, UA 1.020 1.005 - 1.030    Blood, Urine Negative Negative    pH, UA 7.5 5.0 - 8.0    Protein, UA Negative Negative mg/dL    Urobilinogen, Urine 0.2 <2.0 E.U./dL    Nitrite, Urine Negative Negative    Leukocyte Esterase, Urine Negative Negative    Microscopic Examination Not Indicated     Urine Type Voided     Urine Reflex to Culture Not Indicated    Basic Metabolic Panel w/ Reflex to MG   Result Value Ref Range    Sodium 139 136 - 145 mmol/L    Potassium reflex Magnesium 4.9 3.5 - 5.1 mmol/L    Chloride 105 99 - 110 mmol/L    CO2 25 21 - 32 mmol/L    Anion Gap 9 3 - 16    Glucose 96 70 - 99 mg/dL    BUN 12 7 - 20 mg/dL    CREATININE 0.8 0.6 - 1.1 mg/dL    GFR Non-African American >60 >60    GFR African American >60 >60    Calcium 9.7 8.3 - 10.6 mg/dL   Hepatic Function Panel   Result Value Ref Range    Total Protein 7.2 6.4 - 8.2 g/dL    Albumin 3.9 3.4 - 5.0 g/dL    Alkaline Phosphatase 61 40 - 129 U/L    ALT 64 (H) 10 - 40 U/L    AST 51 (H) 15 - 37 U/L    Total Bilirubin 0.6 0.0 - 1.0 mg/dL    Bilirubin, Direct <0.2 0.0 - 0.3 mg/dL    Bilirubin, Indirect see below 0.0 - 1.0 mg/dL   CBC Auto Differential   Result Value Ref Range    WBC 7.7 4.0 - 11.0 K/uL    RBC 4.69 4.00 - 5.20 M/uL    Hemoglobin 13.6 12.0 - 16.0 g/dL    Hematocrit 40.8 36.0 - 48.0 %    MCV 86.9 80.0 - 100.0 fL    MCH 28.9 26.0 - 34.0 pg    MCHC 33.2 31.0 - 36.0 g/dL    RDW 14.5 12.4 - 15.4 %    Platelets 494 228 - 002 K/uL    MPV 7.7 5.0 - 10.5 fL    Neutrophils % 53.2 %    Lymphocytes % 32.6 %    Monocytes % 7.7 %    Eosinophils % 5.1 %    Basophils % 1.4 %    Neutrophils Absolute 4.1 1.7 - 7.7 K/uL    Lymphocytes Absolute 2.5 1.0 - 5.1 K/uL    Monocytes Absolute 0.6 0.0 - 1.3 K/uL    Eosinophils Absolute 0.4 0.0 - 0.6 K/uL    Basophils Absolute 0.1 0.0 - 0.2 K/uL       ED BEDSIDE ULTRASOUND:  No evidence of hydronephrosis    RECENT VITALS:  BP: 135/60, Temp: 97.7 °F (36.5 °C), Pulse: 80, Resp: 16, SpO2: 96 %     Procedures     n/a    ED Course     Nursing Notes, Past Medical Hx,Past Surgical Hx, Social Hx, Allergies, and Family Hx were reviewed. The patient was given the following medications:  Orders Placed This Encounter   Medications    lactated ringers bolus    ketorolac (TORADOL) injection 15 mg    DISCONTD: ondansetron (ZOFRAN) injection 4 mg    promethazine (PHENERGAN) injection 12.5 mg    morphine injection 4 mg    promethazine (PHENERGAN) 12.5 MG tablet     Sig: Take 1 tablet by mouth 3 times daily as needed for Nausea     Dispense:  12 tablet     Refill:  0    tamsulosin (FLOMAX) 0.4 MG capsule     Sig: Take 1 capsule by mouth daily for 5 doses     Dispense:  5 capsule     Refill:  0       CONSULTS:  None    MEDICAL DECISION MAKING / ASSESSMENT / PLAN     Vitals:    02/09/22 1019 02/09/22 1134 02/09/22 1306 02/09/22 1330   BP: (!) 140/87 (!) 144/72 (!) 135/59 135/60   Pulse: 92 79  80   Resp: 20 18  16   Temp: 97.7 °F (36.5 °C)      TempSrc: Oral      SpO2: 98% 99% 99% 96%   Weight: 260 lb (117.9 kg)      Height: 5' 5\" (1.651 m)          Olamide Kenney is a 47 y.o. female who presents to the emergency department with right-sided flank pain. Patient's leg pain started on Monday evening. It feels similar to her previous kidney stones. She has had associated nausea, no episodes of vomiting. She has had associated polyuria, denies any dysuria. She denies any fevers or chills. The patient has remained hemodynamically stable throughout their stay in the emergency department, and appears uncomfortable but nontoxic and well overall. She has positive right-sided CVA tenderness. Patient's labs largely reassuring. Patient has a normal bottle count of 7.7, creatinine of 0.8 and normal urinalysis without any evidence of hematuria or infection.   Given the patient's reassuring labs and history of kidney stone a bedside ultrasound was performed that does not show any evidence of hydronephrosis, therefore I do not feel that CT scan would be indicated at this time. Patient is agreeable to deferring imaging at this time. Patient was given dose of morphine here for pain control, and already has a Percocet prescription for home. She notes that she is out of her Phenergan for which will be refilled. She is not closely followed with urology so she was advised to do so, and we discussed strict return precautions. I have low suspicion for infected kidney stone given the patient's reassuring vital signs, lab work and urinalysis here. I do not believe that this patient requires any further work-up or inpatient management for their complaints, and are appropriate for outpatient follow-up. I discussed the findings of my physical exam and work-up with the patient, and they were given the opportunity to ask questions. The patient is agreeable with the plan and is ready to be . The patient was discharged discharged home with prescriptions for phenergan. Patient instructed to follow-up with PCP, urology as soon as possible, and given strict return precautions. The patient was evaluated by myself and the ED Attending Physician, Dr. Yodit Huber. All management and disposition plans were discussed and agreed upon. Clinical Impression     1. Flank pain    2. History of nephrolithiasis        This note was dictated using voice-recognition software, which occasionally leads to inadvertent typographic errors.     Disposition     PATIENT REFERRED TO:  Diomedes Llamas MD  [address] [facility]  [phone]    Schedule an appointment as soon as possible for a visit       Ely Correa MD  [place] [id number]  [facility] [address] [place] [address]  [phone]    Schedule an appointment as soon as possible for a visit         DISCHARGE MEDICATIONS:  Discharge

## 2022-02-09 NOTE — ED NOTES
Bed: B24-24  Expected date:   Expected time:   Means of arrival:   Comments:  Tamara Lundberg RN  02/09/22 1038

## 2022-02-09 NOTE — ED PROVIDER NOTES
ED Attending Attestation Note     Date of evaluation: 2/9/2022    This patient was seen by the advance practice provider. I have seen and examined the patient, agree with the workup, evaluation, management and diagnosis. The care plan has been discussed. My assessment reveals an uncomfortable appearing patient, in some discomfort, but in no acute respiratory distress. She presents to the emergency department with complaints of right flank pain radiating toward the right groin, which she states woke her from sleep about 2 nights ago. She called her primary care provider's office yesterday morning to note that she felt that she was passing a kidney stone, and was called in a prescription for Percocet. She presents today because she feels that this is not significantly improving her pain. On review of records, it is noted that the patient does have a significant history of renal lithiasis, and has had a number of prior visits for complaints of right flank pain. When cross-sectional imaging is obtained, some of these visits are associated with a passing ureteral stone, and some are not, although intrarenal stones are essentially always seen. Urinalysis today shows no hematuria. Bedside ultrasound shows no hydronephrosis.          Gómez Beverly MD  02/09/22 2172

## 2022-03-09 ENCOUNTER — OFFICE VISIT (OUTPATIENT)
Dept: INTERNAL MEDICINE CLINIC | Age: 52
End: 2022-03-09
Payer: COMMERCIAL

## 2022-03-09 ENCOUNTER — HOSPITAL ENCOUNTER (OUTPATIENT)
Age: 52
Discharge: HOME | End: 2022-03-09
Payer: COMMERCIAL

## 2022-03-09 ENCOUNTER — HOSPITAL ENCOUNTER (OUTPATIENT)
Dept: GENERAL RADIOLOGY | Age: 52
Discharge: HOME | End: 2022-03-09
Payer: COMMERCIAL

## 2022-03-09 VITALS
BODY MASS INDEX: 43.32 KG/M2 | HEIGHT: 65 IN | SYSTOLIC BLOOD PRESSURE: 116 MMHG | DIASTOLIC BLOOD PRESSURE: 71 MMHG | WEIGHT: 260 LBS | HEART RATE: 84 BPM

## 2022-03-09 DIAGNOSIS — M25.531 ACUTE PAIN OF RIGHT WRIST: ICD-10-CM

## 2022-03-09 DIAGNOSIS — M25.531 ACUTE PAIN OF RIGHT WRIST: Primary | ICD-10-CM

## 2022-03-09 PROCEDURE — 1036F TOBACCO NON-USER: CPT | Performed by: HOSPITALIST

## 2022-03-09 PROCEDURE — G8427 DOCREV CUR MEDS BY ELIG CLIN: HCPCS | Performed by: HOSPITALIST

## 2022-03-09 PROCEDURE — 99213 OFFICE O/P EST LOW 20 MIN: CPT | Performed by: HOSPITALIST

## 2022-03-09 PROCEDURE — 73130 X-RAY EXAM OF HAND: CPT

## 2022-03-09 PROCEDURE — 3017F COLORECTAL CA SCREEN DOC REV: CPT | Performed by: HOSPITALIST

## 2022-03-09 PROCEDURE — 96372 THER/PROPH/DIAG INJ SC/IM: CPT | Performed by: HOSPITALIST

## 2022-03-09 PROCEDURE — G8417 CALC BMI ABV UP PARAM F/U: HCPCS | Performed by: HOSPITALIST

## 2022-03-09 PROCEDURE — G8484 FLU IMMUNIZE NO ADMIN: HCPCS | Performed by: HOSPITALIST

## 2022-03-09 RX ORDER — MELOXICAM 15 MG/1
15 TABLET ORAL DAILY PRN
Qty: 30 TABLET | Refills: 1 | Status: SHIPPED | OUTPATIENT
Start: 2022-03-09

## 2022-03-09 RX ORDER — METHYLPREDNISOLONE ACETATE 80 MG/ML
80 INJECTION, SUSPENSION INTRA-ARTICULAR; INTRALESIONAL; INTRAMUSCULAR; SOFT TISSUE ONCE
Status: COMPLETED | OUTPATIENT
Start: 2022-03-09 | End: 2022-03-09

## 2022-03-09 RX ADMIN — METHYLPREDNISOLONE ACETATE 40 MG: 80 INJECTION, SUSPENSION INTRA-ARTICULAR; INTRALESIONAL; INTRAMUSCULAR; SOFT TISSUE at 09:38

## 2022-03-09 NOTE — PROGRESS NOTES
Follow Up Visit Established Patient Visit    Patient:  Nir Davison                                               : 1970  Age: 47 y.o. MRN: [id number]  Date : 3/9/2022    Nir Davison is a 47 y.o. female who presents for : Follow-up appointment    Chief Complaint   Patient presents with    Hand Pain     right hand and wrist     Right wrist pain and swelling in the last 3-4 days. Pain from her R wrist/ hand radiates up to shoulder. Does not report any known injury to her right hand/wrist.  No fever/chills. No night sweats. No unexplained weight loss.     Past Medical History:   Diagnosis Date    Coates's esophagus     Fibromyalgia     Hx of blood clots     has 3 blood clotting factors  liden factor 5, MTHFR, ProteinS    Kidney stone     PONV (postoperative nausea and vomiting)     AND ITCHING       Past Surgical History:   Procedure Laterality Date    ABDOMEN SURGERY      cholecystectomy, 3 c sections, tubaligations, hysterectomy    APPENDECTOMY      BREAST SURGERY Left     benign 3 times- benign cysts, 2016 benign lumpectomy    CHOLECYSTECTOMY      COLONOSCOPY      COLONOSCOPY  2019    COLONOSCOPY WITH BIOPSY performed by Lucien Zapata MD at [facility]  2019    COLONOSCOPY POLYPECTOMY HOT BIOPSY performed by Lucien Zapata MD at [facility] N/A 2021    COLONOSCOPY POLYPECTOMY SNARE/COLD BIOPSY performed by Lucien Zapata MD at [facility], [facility] OTHER SURGICAL HISTORY  10/03/2014    Push Enteroscopy with small bowel biopsy    TONSILLECTOMY      UPPER GASTROINTESTINAL ENDOSCOPY N/A 2018    EGD WITH ESOPHAGEAL  BIOPSY FOR COATES'S AND GASTRIC BIOPSY FOR H PYLORI performed by Lucien Zapata MD at [address] [place] [address] 2019    EGD BIOPSY performed by Lucien Zapata MD at [place] [address] N/A 2019 EGD BIOPSY performed by Val Pereira MD at [address] [place] [address] 11/05/2019    EGD BIOPSY performed by Val Pereira MD at [address] [place] [address] 12/13/2019    EGD BIOPSY performed by Val Pereira MD at [address] [place] [address] N/A 2/5/2021    ESOPHAGOGASTRODUODENOSCOPY performed by Val Pereira MD at [facility] ENDOSCOPY       Current Outpatient Medications   Medication Sig Dispense Refill    meloxicam (MOBIC) 15 MG tablet Take 1 tablet by mouth daily as needed for Pain 30 tablet 1    cyclobenzaprine (FLEXERIL) 10 MG tablet Take 1 tablet by mouth 3 times daily as needed for Muscle spasms 15 tablet 0    ondansetron (ZOFRAN) 8 MG tablet Take 1 tablet by mouth every 8 hours as needed for Nausea 20 tablet 0    vitamin D (ERGOCALCIFEROL) 1.25 MG (01721 UT) CAPS capsule Take 1 capsule by mouth once a week 12 capsule 3    pantoprazole (PROTONIX) 40 MG tablet 2 times daily       amitriptyline (ELAVIL) 100 MG tablet Take 100 mg by mouth nightly as needed        No current facility-administered medications for this visit. /71   Pulse 84   Ht 5' 5\" (1.651 m)   Wt 260 lb (117.9 kg)   LMP 08/30/2015   BMI 43.27 kg/m²     Physical Exam   Physical Exam  Vitals and nursing note reviewed. Constitutional:       General: She is not in acute distress. Appearance: Normal appearance. She is well-developed. HENT:      Head: Normocephalic and atraumatic. Mouth/Throat:      Mouth: Mucous membranes are moist.      Pharynx: Oropharynx is clear. No oropharyngeal exudate or posterior oropharyngeal erythema. Eyes:      General: No scleral icterus. Extraocular Movements: Extraocular movements intact. Pupils: Pupils are equal, round, and reactive to light. Neck:      Vascular: No carotid bruit or JVD. Cardiovascular:      Rate and Rhythm: Normal rate and regular rhythm. Heart sounds: Normal heart sounds. No murmur heard. No friction rub. No gallop. Pulmonary:      Effort: Pulmonary effort is normal. No respiratory distress. Breath sounds: Normal breath sounds. No wheezing or rales. Abdominal:      General: Bowel sounds are normal. There is no distension. Palpations: Abdomen is soft. Tenderness: There is no abdominal tenderness. There is no right CVA tenderness or left CVA tenderness. Musculoskeletal:         General: Swelling (R wrist and hand) and tenderness (right wrist tenderness on dorsiflexion) present. Normal range of motion. Cervical back: Normal range of motion and neck supple. Right lower leg: No edema. Left lower leg: No edema. Lymphadenopathy:      Cervical: No cervical adenopathy. Skin:     General: Skin is warm and dry. Neurological:      General: No focal deficit present. Mental Status: She is alert and oriented to person, place, and time. Cranial Nerves: No cranial nerve deficit. Psychiatric:         Mood and Affect: Mood normal.         Assessment/ plan:     Angie was seen today for hand pain. Diagnoses and all orders for this visit:    Acute pain of right wrist, most probably, secondary to sprain  -     XR HAND RIGHT (2 VIEWS); Future  -     meloxicam (MOBIC) 15 MG tablet; Take 1 tablet by mouth daily as needed for Pain  -     methylPREDNISolone acetate (DEPO-MEDROL) injection 80 mg  -     encouraged the patient to wear right wrist splint (the prescription was given)  -     I will refer the patient for further hand surgery evaluation if condition does not improve within the next 10-14 days        Return if symptoms worsen or fail to improve.     Jelena Hdz MD

## 2022-03-10 ENCOUNTER — TELEPHONE (OUTPATIENT)
Dept: INTERNAL MEDICINE CLINIC | Age: 52
End: 2022-03-10

## 2022-03-17 ENCOUNTER — TELEPHONE (OUTPATIENT)
Dept: INTERNAL MEDICINE CLINIC | Age: 52
End: 2022-03-17

## 2022-03-17 ENCOUNTER — PATIENT MESSAGE (OUTPATIENT)
Dept: INTERNAL MEDICINE CLINIC | Age: 52
End: 2022-03-17

## 2022-03-17 NOTE — TELEPHONE ENCOUNTER
From: Yen Gonzalez  To: Dr. Ronny Whitman: 3/17/2022 11:46 AM EDT  Subject: Wrist xray    I sorry I missed both of your calls last week , not sure why but the calls for me went straight to my voicemail, never rang on my part, sorry about that   So did the X-ray show that I have Tendinitis?   Thanks   [employer]

## 2022-03-17 NOTE — TELEPHONE ENCOUNTER
From: Lisa Lema  To: Dr. Fredo Mcdonald: 3/17/2022 11:46 AM EDT  Subject: Wrist xray    I sorry I missed both of your calls last week , not sure why but the calls for me went straight to my voicemail, never rang on my part, sorry about that   So did the X-ray show that I have Tendinitis?   Thanks   [employer]

## 2022-03-25 ENCOUNTER — OFFICE VISIT (OUTPATIENT)
Dept: INTERNAL MEDICINE CLINIC | Age: 52
End: 2022-03-25
Payer: COMMERCIAL

## 2022-03-25 VITALS
DIASTOLIC BLOOD PRESSURE: 84 MMHG | HEART RATE: 104 BPM | SYSTOLIC BLOOD PRESSURE: 124 MMHG | OXYGEN SATURATION: 97 % | WEIGHT: 259 LBS | BODY MASS INDEX: 43.1 KG/M2 | TEMPERATURE: 97.8 F

## 2022-03-25 DIAGNOSIS — J06.9 ACUTE URI: Primary | ICD-10-CM

## 2022-03-25 PROCEDURE — G8417 CALC BMI ABV UP PARAM F/U: HCPCS | Performed by: HOSPITALIST

## 2022-03-25 PROCEDURE — 99213 OFFICE O/P EST LOW 20 MIN: CPT | Performed by: HOSPITALIST

## 2022-03-25 PROCEDURE — 3017F COLORECTAL CA SCREEN DOC REV: CPT | Performed by: HOSPITALIST

## 2022-03-25 PROCEDURE — 1036F TOBACCO NON-USER: CPT | Performed by: HOSPITALIST

## 2022-03-25 PROCEDURE — G8484 FLU IMMUNIZE NO ADMIN: HCPCS | Performed by: HOSPITALIST

## 2022-03-25 PROCEDURE — G8427 DOCREV CUR MEDS BY ELIG CLIN: HCPCS | Performed by: HOSPITALIST

## 2022-03-25 RX ORDER — GUAIFENESIN AND CODEINE PHOSPHATE 100; 10 MG/5ML; MG/5ML
5 SOLUTION ORAL 2 TIMES DAILY PRN
Qty: 140 ML | Refills: 0 | Status: SHIPPED | OUTPATIENT
Start: 2022-03-25 | End: 2022-04-01

## 2022-03-25 RX ORDER — PREDNISONE 20 MG/1
20 TABLET ORAL DAILY
Qty: 5 TABLET | Refills: 0 | Status: SHIPPED | OUTPATIENT
Start: 2022-03-25 | End: 2022-03-30

## 2022-03-25 NOTE — PROGRESS NOTES
Follow Up Visit Established Patient Visit    Patient:  Atilio Quiñones                                               : 1970  Age: 47 y.o. MRN: [id number]  Date : 3/25/2022    Atilio Quiñones is a 47 y.o. female who presents for : Evaluation of sinus congestion, sore throat, and with cough. Chief Complaint   Patient presents with    Congestion     green in color    Cough    Other     Head pressure    Otalgia     right    Pharyngitis     mild     Started to have symptoms of acute URI 3 days ago. She does not report fever/chills. Cough is frequent and interferes with her ability to rest and sleep. She does not report wheezing.   There is sinus congestion and some discomfort over her sinuses on the face    Past Medical History:   Diagnosis Date    Coates's esophagus     Fibromyalgia     Hx of blood clots     has 3 blood clotting factors  liden factor 5, MTHFR, ProteinS    Kidney stone     PONV (postoperative nausea and vomiting)     AND ITCHING       Past Surgical History:   Procedure Laterality Date    ABDOMEN SURGERY      cholecystectomy, 3 c sections, tubaligations, hysterectomy    APPENDECTOMY      BREAST SURGERY Left     benign 3 times- benign cysts, 2016 benign lumpectomy    CHOLECYSTECTOMY      COLONOSCOPY      COLONOSCOPY  2019    COLONOSCOPY WITH BIOPSY performed by Trista Alejandro MD at [address]  2019    COLONOSCOPY POLYPECTOMY HOT BIOPSY performed by Trista Alejandro MD at [address] N/A 2021    COLONOSCOPY POLYPECTOMY SNARE/COLD BIOPSY performed by Trista [facility]jandro MD at [facility], DIAGNOSTIC     [facility] OTHER SURGICAL HISTORY  10/03/2014    Push Enteroscopy with small bowel biopsy    TONSILLECTOMY      UPPER GASTROINTESTINAL ENDOSCOPY N/A 2018    EGD WITH ESOPHAGEAL  BIOPSY FOR COATES'S AND GASTRIC BIOPSY FOR H PYLORI performed by Trista Alejandro MD at [place] [address] GASTROINTESTINAL ENDOSCOPY N/A 04/16/2019    EGD BIOPSY performed by Josie Pitts MD at [address] [place] [address] [facility] 05/28/2019    EGD BIOPSY performed by Josie Pitts MD at [address] [place] [address] [facility] 11/05/2019    EGD BIOPSY performed by Josie Pitts MD at [address] [place] [address] 12/13/2019    EGD BIOPSY performed by Josie Pitts MD at [address] [place] [address] 2/5/2021    ESOPHAGOGASTRODUODENOSCOPY performed by Josie Pitts MD at [facility] ENDOSCOPY       Current Outpatient Medications   Medication Sig Dispense Refill    predniSONE (DELTASONE) 20 MG tablet Take 1 tablet by mouth daily for 5 days 5 tablet 0    guaiFENesin-codeine (TUSSI-ORGANIDIN NR) 100-10 MG/5ML syrup Take 5 mLs by mouth 2 times daily as needed for Cough for up to 7 days. 140 mL 0    meloxicam (MOBIC) 15 MG tablet Take 1 tablet by mouth daily as needed for Pain 30 tablet 1    cyclobenzaprine (FLEXERIL) 10 MG tablet Take 1 tablet by mouth 3 times daily as needed for Muscle spasms 15 tablet 0    vitamin D (ERGOCALCIFEROL) 1.25 MG (08179 UT) CAPS capsule Take 1 capsule by mouth once a week 12 capsule 3    pantoprazole (PROTONIX) 40 MG tablet 2 times daily       ondansetron (ZOFRAN) 8 MG tablet Take 1 tablet by mouth every 8 hours as needed for Nausea 20 tablet 0    amitriptyline (ELAVIL) 100 MG tablet Take 100 mg by mouth nightly as needed        No current facility-administered medications for this visit. /84   Pulse 104   Temp 97.8 °F (36.6 °C) (Oral)   Wt 259 lb (117.5 kg)   LMP 08/30/2015   SpO2 97%   BMI 43.10 kg/m²     Physical Exam   Physical Exam  Vitals and nursing note reviewed. Constitutional:       General: She is not in acute distress. Appearance: Normal appearance. She is well-developed. HENT:      Head: Normocephalic and atraumatic.       Comments: Mild facial tenderness over frontal and maxillary sinuses to palpation     Right Ear: Tympanic membrane, ear canal and external ear normal. There is no impacted cerumen. Left Ear: Tympanic membrane, ear canal and external ear normal. There is no impacted cerumen. Nose: Nose normal.      Mouth/Throat:      Mouth: Mucous membranes are moist.      Pharynx: No oropharyngeal exudate. Comments: There is mild posterior pharyngeal wall erythema  Eyes:      General: No scleral icterus. Pupils: Pupils are equal, round, and reactive to light. Neck:      Vascular: No JVD. Cardiovascular:      Rate and Rhythm: Normal rate and regular rhythm. Heart sounds: Normal heart sounds. No murmur heard. No friction rub. No gallop. Pulmonary:      Effort: Pulmonary effort is normal. No respiratory distress. Breath sounds: Normal breath sounds. No wheezing or rales. Abdominal:      General: Bowel sounds are normal. There is no distension. Palpations: Abdomen is soft. Tenderness: There is no abdominal tenderness. There is no right CVA tenderness or left CVA tenderness. Musculoskeletal:         General: Normal range of motion. Cervical back: Normal range of motion. Right lower leg: No edema. Left lower leg: No edema. Skin:     General: Skin is warm and dry. Neurological:      General: No focal deficit present. Mental Status: She is alert and oriented to person, place, and time. Cranial Nerves: No cranial nerve deficit. Psychiatric:         Mood and Affect: Mood normal.         Assessment/ plan:     Angie was seen today for congestion, cough, other, otalgia and pharyngitis. Diagnoses and all orders for this visit:    Acute URI  -     predniSONE (DELTASONE) 20 MG tablet; Take 1 tablet by mouth daily for 5 days  -     guaiFENesin-codeine (TUSSI-ORGANIDIN NR) 100-10 MG/5ML syrup;  Take 5 mLs by mouth 2 times daily as needed for Cough for up to 7 days.  -     Separate call lozenges every 3-4 hours as needed  -     Encouraged the patient to stay well-hydrated   -     Supportive care        Return if symptoms worsen or fail to improve.     Sona Crespo MD

## 2022-03-28 ENCOUNTER — TELEPHONE (OUTPATIENT)
Dept: INTERNAL MEDICINE CLINIC | Age: 52
End: 2022-03-28

## 2022-03-28 RX ORDER — DOXYCYCLINE HYCLATE 100 MG
100 TABLET ORAL 2 TIMES DAILY
Qty: 14 TABLET | Refills: 0 | Status: SHIPPED | OUTPATIENT
Start: 2022-03-28 | End: 2022-04-04

## 2022-03-28 NOTE — TELEPHONE ENCOUNTER
Good morning   I am still not much better since Friday, I am blowing so much greenish/yellow snot from nose and still coughing and coughing up greenish/yellow snot/mucus.   You said Friday if not better by Monday to let you know  Thanks

## 2022-03-29 ENCOUNTER — TELEPHONE (OUTPATIENT)
Dept: INTERNAL MEDICINE CLINIC | Age: 52
End: 2022-03-29

## 2022-03-29 NOTE — TELEPHONE ENCOUNTER
I was looking on my chart under visits and it look like Dr James Morales tried calling at 11:32 today my phone never rang and I never got a voicemail so I was just wondering if he tried calling me and if so if you could call me back         Jacob Morillo MD Yesterday (7:52 AM)     Penelope Osei      Good morning   I am still not much better since Friday, I am blowing so much greenish/yellow snot from nose and still coughing and coughing up greenish/yellow snot/mucus.   You said Friday if not better by Monday to let you know  Thanks   Angie

## 2022-04-01 ENCOUNTER — PATIENT MESSAGE (OUTPATIENT)
Dept: INTERNAL MEDICINE CLINIC | Age: 52
End: 2022-04-01

## 2022-04-04 ENCOUNTER — TELEPHONE (OUTPATIENT)
Dept: INTERNAL MEDICINE CLINIC | Age: 52
End: 2022-04-04

## 2022-04-04 RX ORDER — CYCLOBENZAPRINE HCL 10 MG
10 TABLET ORAL 3 TIMES DAILY PRN
Qty: 15 TABLET | Refills: 0 | Status: SHIPPED | OUTPATIENT
Start: 2022-04-04

## 2022-04-04 RX ORDER — TRAZODONE HYDROCHLORIDE 50 MG/1
50 TABLET ORAL NIGHTLY PRN
Qty: 30 TABLET | Refills: 0 | Status: SHIPPED | OUTPATIENT
Start: 2022-04-04 | End: 2022-05-02

## 2022-04-04 NOTE — TELEPHONE ENCOUNTER
Not sleeping    Althea Johnson MD 3 days ago     TW      I have been having problems sleeping the past month, my fibromyalgia has been flaring up.   The Amitriptyline Stopped helping or working in the past so I was wondering if I could have something else that could help me sleep

## 2022-05-02 RX ORDER — TRAZODONE HYDROCHLORIDE 50 MG/1
TABLET ORAL
Qty: 30 TABLET | Refills: 0 | Status: SHIPPED | OUTPATIENT
Start: 2022-05-02 | End: 2022-06-10

## 2022-05-13 ENCOUNTER — TELEPHONE (OUTPATIENT)
Dept: INTERNAL MEDICINE CLINIC | Age: 52
End: 2022-05-13

## 2022-05-13 DIAGNOSIS — N20.0 KIDNEY STONE: Primary | ICD-10-CM

## 2022-05-13 RX ORDER — TRAMADOL HYDROCHLORIDE 50 MG/1
50 TABLET ORAL EVERY 8 HOURS PRN
Qty: 9 TABLET | Refills: 0 | OUTPATIENT
Start: 2022-05-13 | End: 2022-05-17 | Stop reason: SDUPTHER

## 2022-05-13 NOTE — TELEPHONE ENCOUNTER
Kidney stone     Helen Fink MD 3 hours ago (6:14 AM)     TW      I am passing a kidney stone, started last night , could I have something for the pain ?   Thanks   [employer]

## 2022-05-17 ENCOUNTER — PATIENT MESSAGE (OUTPATIENT)
Dept: INTERNAL MEDICINE CLINIC | Age: 52
End: 2022-05-17

## 2022-05-17 DIAGNOSIS — N20.0 KIDNEY STONE: ICD-10-CM

## 2022-05-17 RX ORDER — TRAMADOL HYDROCHLORIDE 50 MG/1
50 TABLET ORAL EVERY 8 HOURS PRN
Qty: 9 TABLET | Refills: 0 | Status: SHIPPED | OUTPATIENT
Start: 2022-05-17 | End: 2022-05-20

## 2022-05-17 NOTE — TELEPHONE ENCOUNTER
Stone pain    Erin Henriquez MD 7 minutes ago (8:42 AM)     TW      Could I have something stronger for pain from kidney stone passing, maybe Percocet?   Thanks

## 2022-06-10 RX ORDER — TRAZODONE HYDROCHLORIDE 50 MG/1
TABLET ORAL
Qty: 30 TABLET | Refills: 0 | Status: SHIPPED | OUTPATIENT
Start: 2022-06-10

## 2022-07-12 ENCOUNTER — TELEPHONE (OUTPATIENT)
Dept: INTERNAL MEDICINE CLINIC | Age: 52
End: 2022-07-12

## 2022-07-12 NOTE — TELEPHONE ENCOUNTER
Manuel Panchal MD 2 hours ago (8:16 AM)     TW      I am passing a kidney stone I was wondering if I could have some Percocet for pain

## 2022-07-13 DIAGNOSIS — N20.0 RECURRENT NEPHROLITHIASIS: Primary | ICD-10-CM

## 2022-07-14 RX ORDER — OXYCODONE HYDROCHLORIDE AND ACETAMINOPHEN 5; 325 MG/1; MG/1
1 TABLET ORAL 3 TIMES DAILY
Qty: 9 TABLET | Refills: 0 | Status: SHIPPED | OUTPATIENT
Start: 2022-07-14 | End: 2022-09-14 | Stop reason: SDUPTHER

## 2022-09-14 ENCOUNTER — PATIENT MESSAGE (OUTPATIENT)
Dept: INTERNAL MEDICINE CLINIC | Age: 52
End: 2022-09-14

## 2022-09-14 DIAGNOSIS — N20.0 RECURRENT NEPHROLITHIASIS: ICD-10-CM

## 2022-09-14 RX ORDER — OXYCODONE HYDROCHLORIDE AND ACETAMINOPHEN 5; 325 MG/1; MG/1
1 TABLET ORAL 3 TIMES DAILY
Qty: 9 TABLET | Refills: 0 | Status: SHIPPED | OUTPATIENT
Start: 2022-09-14 | End: 2022-09-17

## 2022-09-14 NOTE — TELEPHONE ENCOUNTER
Kidney stone   (Newest Message First)  Ashley Beckham MD 3 hours ago (7:00 AM)     TW  I am passing a kidney stone, could dr Jenny Norton call me in some Percocet  Thanks

## 2022-11-28 ENCOUNTER — PATIENT MESSAGE (OUTPATIENT)
Dept: INTERNAL MEDICINE CLINIC | Age: 52
End: 2022-11-28

## 2022-11-28 NOTE — TELEPHONE ENCOUNTER
From: Jovan Hinton  To: Dr. Eddie Lema: 11/28/2022 8:25 AM EST  Subject: Sick    I have had cold symptoms since Wednesday night. Saturday started getting worsehave headache, stuffy/runny nose lots of congestion sore throat very fatigue lots of coughing ran low grade fever yesterday. I took a covid test and it is negative   I believe I have a upper respiratory infection , could I get an antibiotic and cough medicine with codeine.

## 2022-11-29 ENCOUNTER — TELEPHONE (OUTPATIENT)
Dept: INTERNAL MEDICINE CLINIC | Age: 52
End: 2022-11-29

## 2023-01-03 ENCOUNTER — PATIENT MESSAGE (OUTPATIENT)
Dept: INTERNAL MEDICINE CLINIC | Age: 53
End: 2023-01-03

## 2023-01-03 ENCOUNTER — TELEPHONE (OUTPATIENT)
Dept: INTERNAL MEDICINE CLINIC | Age: 53
End: 2023-01-03

## 2023-01-03 NOTE — TELEPHONE ENCOUNTER
Kidney stone pain  (Newest Message First)  Ericka Block [facility] [address] [facility] (supporting Heri Mcguire MD) 1 hour ago (2:04 PM)     TW  I started passing a stone earlier today , could Dr Lucrecia Abdi give me Percocet for pain and phenagren for nausea   Thanks

## 2023-01-04 ENCOUNTER — HOSPITAL ENCOUNTER (OUTPATIENT)
Dept: CT IMAGING | Age: 53
Discharge: HOME | End: 2023-01-04
Payer: COMMERCIAL

## 2023-01-04 ENCOUNTER — OFFICE VISIT (OUTPATIENT)
Dept: INTERNAL MEDICINE CLINIC | Age: 53
End: 2023-01-04
Payer: COMMERCIAL

## 2023-01-04 VITALS
DIASTOLIC BLOOD PRESSURE: 72 MMHG | BODY MASS INDEX: 42.43 KG/M2 | TEMPERATURE: 98.3 F | OXYGEN SATURATION: 98 % | WEIGHT: 255 LBS | HEART RATE: 87 BPM | SYSTOLIC BLOOD PRESSURE: 128 MMHG

## 2023-01-04 DIAGNOSIS — N39.0 RECURRENT UTI: ICD-10-CM

## 2023-01-04 DIAGNOSIS — R10.9 ACUTE RIGHT FLANK PAIN: Primary | ICD-10-CM

## 2023-01-04 DIAGNOSIS — R11.0 NAUSEA IN ADULT PATIENT: ICD-10-CM

## 2023-01-04 DIAGNOSIS — R10.9 ACUTE RIGHT FLANK PAIN: ICD-10-CM

## 2023-01-04 PROCEDURE — G8417 CALC BMI ABV UP PARAM F/U: HCPCS | Performed by: HOSPITALIST

## 2023-01-04 PROCEDURE — G8484 FLU IMMUNIZE NO ADMIN: HCPCS | Performed by: HOSPITALIST

## 2023-01-04 PROCEDURE — G8427 DOCREV CUR MEDS BY ELIG CLIN: HCPCS | Performed by: HOSPITALIST

## 2023-01-04 PROCEDURE — 74150 CT ABDOMEN W/O CONTRAST: CPT

## 2023-01-04 PROCEDURE — 99213 OFFICE O/P EST LOW 20 MIN: CPT | Performed by: HOSPITALIST

## 2023-01-04 PROCEDURE — 3017F COLORECTAL CA SCREEN DOC REV: CPT | Performed by: HOSPITALIST

## 2023-01-04 PROCEDURE — 1036F TOBACCO NON-USER: CPT | Performed by: HOSPITALIST

## 2023-01-04 RX ORDER — PROMETHAZINE HYDROCHLORIDE 12.5 MG/1
12.5 TABLET ORAL 3 TIMES DAILY PRN
Qty: 12 TABLET | Refills: 0 | Status: SHIPPED | OUTPATIENT
Start: 2023-01-04 | End: 2023-01-11

## 2023-01-04 RX ORDER — OXYCODONE HYDROCHLORIDE AND ACETAMINOPHEN 5; 325 MG/1; MG/1
1 TABLET ORAL EVERY 6 HOURS PRN
Qty: 12 TABLET | Refills: 0 | Status: SHIPPED | OUTPATIENT
Start: 2023-01-04 | End: 2023-01-07

## 2023-01-04 RX ORDER — CIPROFLOXACIN 500 MG/1
500 TABLET, FILM COATED ORAL 2 TIMES DAILY
Qty: 20 TABLET | Refills: 0 | Status: SHIPPED | OUTPATIENT
Start: 2023-01-04 | End: 2023-01-14

## 2023-01-04 NOTE — PROGRESS NOTES
Follow Up Visit Established Patient Visit    Patient:  Jarrod Sanchez                                               : 1970  Age: 47 y.o. MRN: [id number]  Date : 2023    Jarrod Sanchez is a 47 y.o. female who presents for : As above recurrent right-sided kidney stones    Chief Complaint   Patient presents with    Flank Pain     Right flank pain started one day ago    Nausea     Reports R sided flank pain with radiation to R groin area in the last 36 hours. She also reports dysuria and severe nausea. Some subjective fever/chills. No gross hematuria.   + History of recurrent kidney stones in the past  Last CT scan of the abdomen and pelvis without contrast was performed on 2021  1.  3 mm calculus of the distal right ureter. 2. Hepatic steatosis.        Past Medical History:   Diagnosis Date    Coates's esophagus     Fibromyalgia     Hx of blood clots     has 3 blood clotting factors  liden factor 5, MTHFR, ProteinS    Kidney stone     PONV (postoperative nausea and vomiting)     AND ITCHING       Past Surgical History:   Procedure Laterality Date    ABDOMEN SURGERY      cholecystectomy, 3 c sections, tubaligations, hysterectomy    APPENDECTOMY      BREAST SURGERY Left     benign 3 times- benign cysts, 2016 benign lumpectomy    CHOLECYSTECTOMY      COLONOSCOPY      COLONOSCOPY  2019    COLONOSCOPY WITH BIOPSY performed by Stepan Ty MD at [place] [address]  2019    COLONOSCOPY POLYPECTOMY HOT BIOPSY performed by Stpean Ty MD at [address] [place] [address] 2021    COLONOSCOPY POLYPECTOMY SNARE/COLD BIOPSY performed by Stepan Ty MD at [address] [place] [address], COLON, DIAGNOSTIC      HYSTERECTOMY (CERVIX STATUS UNKNOWN)      OTHER SURGICAL HISTORY  10/03/2014    Push Enteroscopy with small bowel biopsy    TONSILLECTOMY      UPPER GASTROINTESTINAL ENDOSCOPY N/A 2018    EGD WITH ESOPHAGEAL  BIOPSY FOR COATES'S AND GASTRIC BIOPSY FOR H PYLORI performed by Lucia Meneses MD at [address]  04/16/2019    EGD BIOPSY performed by Lucia Meneses MD at [address] [facility] [address] N/A 05/28/2019    EGD BIOPSY performed by Lucia Meneses MD at [address]  11/05/2019    [address]GD [address]SY [facility] [address] [facility] MD [address]  12/13/2019    EGD BIOPSY performed by Lucia eMneses MD at [address] [place], [place] [address]  2/5/2021    ESOPHAGOGASTRODUODENOSCOPY performed by Lucia Meneses MD at [facility] ENDOSCOPY       Current Outpatient Medications   Medication Sig Dispense Refill    ciprofloxacin (CIPRO) 500 MG tablet Take 1 tablet by mouth 2 times daily for 10 days 20 tablet 0    oxyCODONE-acetaminophen (PERCOCET) 5-325 MG per tablet Take 1 tablet by mouth every 6 hours as needed for Pain for up to 3 days. Intended supply: 3 days. Take lowest dose possible to manage pain Max Daily Amount: 4 tablets 12 tablet 0    promethazine (PHENERGAN) 12.5 MG tablet Take 1 tablet by mouth 3 times daily as needed for Nausea 12 tablet 0    cyclobenzaprine (FLEXERIL) 10 MG tablet Take 1 tablet by mouth 3 times daily as needed for Muscle spasms 15 tablet 0    vitamin D (ERGOCALCIFEROL) 1.25 MG (38492 UT) CAPS capsule Take 1 capsule by mouth once a week 12 capsule 3    pantoprazole (PROTONIX) 40 MG tablet 2 times daily       traZODone (DESYREL) 50 MG tablet TAKE ONE TABLET BY MOUTH ONCE NIGHTLY AS NEEDED FOR SLEEP (Patient not taking: Reported on 1/4/2023) 30 tablet 0    meloxicam (MOBIC) 15 MG tablet Take 1 tablet by mouth daily as needed for Pain (Patient not taking: Reported on 1/4/2023) 30 tablet 1     No current facility-administered medications for this visit.        /72   Pulse 87   Temp 98.3 °F (36.8 °C) (Temporal)   Wt 255 lb (115.7 kg)   LMP 08/30/2015   SpO2 98%   BMI 42.43 kg/m²     Physical Exam   Physical Exam  Vitals and nursing note reviewed. Constitutional:       General: She is not in acute distress. Appearance: She is well-developed. HENT:      Head: Normocephalic and atraumatic. Right Ear: Tympanic membrane, ear canal and external ear normal. There is no impacted cerumen. Left Ear: Tympanic membrane, ear canal and external ear normal. There is no impacted cerumen. Nose:      Comments: Nasal mucosa is moderately swollen and mildly inflamed     Mouth/Throat:      Mouth: Mucous membranes are moist.      Pharynx: Oropharynx is clear. No oropharyngeal exudate or posterior oropharyngeal erythema. Eyes:      General: No scleral icterus. Pupils: Pupils are equal, round, and reactive to light. Neck:      Vascular: No carotid bruit or JVD. Cardiovascular:      Rate and Rhythm: Normal rate and regular rhythm. Heart sounds: Normal heart sounds. No murmur heard. No friction rub. No gallop. Pulmonary:      Effort: Pulmonary effort is normal. No respiratory distress. Breath sounds: Normal breath sounds. No wheezing or rales. Abdominal:      General: Bowel sounds are normal. There is no distension. Palpations: Abdomen is soft. Tenderness: There is abdominal tenderness (Some right lower quadrant tenderness to deep palpation). There is right CVA tenderness. There is no left CVA tenderness. Comments: Mildly obese   Musculoskeletal:         General: No tenderness. Normal range of motion. Cervical back: Normal range of motion and neck supple. Right lower leg: No edema. Left lower leg: No edema. Lymphadenopathy:      Cervical: No cervical adenopathy. Skin:     General: Skin is warm and dry. Findings: No erythema, lesion or rash. Neurological:      Mental Status: She is alert and oriented to person, place, and time. Cranial Nerves: No cranial nerve deficit.       Gait: Gait normal.      Deep Tendon Reflexes: Reflexes normal. Assessment/ plan:     Shayna Ortiz was seen today for flank pain and nausea. Diagnoses and all orders for this visit:    Acute right flank pain  -     oxyCODONE-acetaminophen (PERCOCET) 5-325 MG per tablet; Take 1 tablet by mouth every 6 hours as needed for Pain for up to 3 days. Intended supply: 3 days. Take lowest dose possible to manage pain Max Daily Amount: 4 tablets  -     CT ABDOMEN WO CONTRAST Additional Contrast? None; Future    Recurrent UTI  -     ciprofloxacin (CIPRO) 500 MG tablet; Take 1 tablet by mouth 2 times daily for 10 days    Nausea in adult patient  -     promethazine (PHENERGAN) 12.5 MG tablet; Take 1 tablet by mouth 3 times daily as needed for Nausea      Return if symptoms worsen or fail to improve.     Connie Nixon MD

## 2023-01-09 ENCOUNTER — TELEPHONE (OUTPATIENT)
Dept: INTERNAL MEDICINE CLINIC | Age: 53
End: 2023-01-09

## 2023-01-09 ENCOUNTER — PATIENT MESSAGE (OUTPATIENT)
Dept: INTERNAL MEDICINE CLINIC | Age: 53
End: 2023-01-09

## 2023-01-09 NOTE — TELEPHONE ENCOUNTER
Infection   (Newest Message First)  [facility] (supporting [employer], MD) 51 minutes ago (8:44 AM)     TW  Good morning   Was just wondering if I may need stronger/different antibiotic, still not feeling 100% or will it just take a little more time for infection to clear up.

## 2023-03-01 ENCOUNTER — ANESTHESIA (OUTPATIENT)
Dept: ENDOSCOPY | Age: 53
End: 2023-03-01
Payer: COMMERCIAL

## 2023-03-01 ENCOUNTER — HOSPITAL ENCOUNTER (OUTPATIENT)
Age: 53
Setting detail: OUTPATIENT SURGERY
Discharge: HOME | End: 2023-03-01
Attending: INTERNAL MEDICINE | Admitting: INTERNAL MEDICINE
Payer: COMMERCIAL

## 2023-03-01 ENCOUNTER — ANESTHESIA EVENT (OUTPATIENT)
Dept: ENDOSCOPY | Age: 53
End: 2023-03-01
Payer: COMMERCIAL

## 2023-03-01 VITALS
RESPIRATION RATE: 20 BRPM | SYSTOLIC BLOOD PRESSURE: 128 MMHG | BODY MASS INDEX: 41.65 KG/M2 | HEART RATE: 73 BPM | WEIGHT: 250 LBS | HEIGHT: 65 IN | DIASTOLIC BLOOD PRESSURE: 79 MMHG | TEMPERATURE: 96.8 F | OXYGEN SATURATION: 97 %

## 2023-03-01 DIAGNOSIS — K22.70 BARRETT'S ESOPHAGUS WITHOUT DYSPLASIA: ICD-10-CM

## 2023-03-01 PROCEDURE — 2709999900 HC NON-CHARGEABLE SUPPLY: Performed by: INTERNAL MEDICINE

## 2023-03-01 PROCEDURE — 2580000003 HC RX 258: Performed by: NURSE ANESTHETIST, CERTIFIED REGISTERED

## 2023-03-01 PROCEDURE — 6360000002 HC RX W HCPCS: Performed by: NURSE ANESTHETIST, CERTIFIED REGISTERED

## 2023-03-01 PROCEDURE — 2500000003 HC RX 250 WO HCPCS: Performed by: NURSE ANESTHETIST, CERTIFIED REGISTERED

## 2023-03-01 PROCEDURE — 3700000000 HC ANESTHESIA ATTENDED CARE: Performed by: INTERNAL MEDICINE

## 2023-03-01 PROCEDURE — 7100000010 HC PHASE II RECOVERY - FIRST 15 MIN: Performed by: INTERNAL MEDICINE

## 2023-03-01 PROCEDURE — 88305 TISSUE EXAM BY PATHOLOGIST: CPT

## 2023-03-01 PROCEDURE — 3609012400 HC EGD TRANSORAL BIOPSY SINGLE/MULTIPLE: Performed by: INTERNAL MEDICINE

## 2023-03-01 PROCEDURE — 7100000011 HC PHASE II RECOVERY - ADDTL 15 MIN: Performed by: INTERNAL MEDICINE

## 2023-03-01 PROCEDURE — 3700000001 HC ADD 15 MINUTES (ANESTHESIA): Performed by: INTERNAL MEDICINE

## 2023-03-01 PROCEDURE — 2580000003 HC RX 258: Performed by: ANESTHESIOLOGY

## 2023-03-01 RX ORDER — PROPOFOL 10 MG/ML
INJECTION, EMULSION INTRAVENOUS PRN
Status: DISCONTINUED | OUTPATIENT
Start: 2023-03-01 | End: 2023-03-01 | Stop reason: SDUPTHER

## 2023-03-01 RX ORDER — PROMETHAZINE HYDROCHLORIDE 25 MG/ML
INJECTION, SOLUTION INTRAMUSCULAR; INTRAVENOUS PRN
Status: DISCONTINUED | OUTPATIENT
Start: 2023-03-01 | End: 2023-03-01 | Stop reason: SDUPTHER

## 2023-03-01 RX ORDER — PROMETHAZINE HYDROCHLORIDE 25 MG/ML
12.5 INJECTION, SOLUTION INTRAMUSCULAR; INTRAVENOUS ONCE
Status: DISCONTINUED | OUTPATIENT
Start: 2023-03-01 | End: 2023-03-01 | Stop reason: HOSPADM

## 2023-03-01 RX ORDER — DEXAMETHASONE SODIUM PHOSPHATE 4 MG/ML
INJECTION, SOLUTION INTRA-ARTICULAR; INTRALESIONAL; INTRAMUSCULAR; INTRAVENOUS; SOFT TISSUE PRN
Status: DISCONTINUED | OUTPATIENT
Start: 2023-03-01 | End: 2023-03-01 | Stop reason: SDUPTHER

## 2023-03-01 RX ORDER — SODIUM CHLORIDE 0.9 % (FLUSH) 0.9 %
5-40 SYRINGE (ML) INJECTION PRN
Status: DISCONTINUED | OUTPATIENT
Start: 2023-03-01 | End: 2023-03-01 | Stop reason: HOSPADM

## 2023-03-01 RX ORDER — LIDOCAINE HYDROCHLORIDE 20 MG/ML
INJECTION, SOLUTION EPIDURAL; INFILTRATION; INTRACAUDAL; PERINEURAL PRN
Status: DISCONTINUED | OUTPATIENT
Start: 2023-03-01 | End: 2023-03-01 | Stop reason: SDUPTHER

## 2023-03-01 RX ORDER — DIPHENHYDRAMINE HYDROCHLORIDE 50 MG/ML
INJECTION INTRAMUSCULAR; INTRAVENOUS PRN
Status: DISCONTINUED | OUTPATIENT
Start: 2023-03-01 | End: 2023-03-01 | Stop reason: SDUPTHER

## 2023-03-01 RX ORDER — ONDANSETRON 2 MG/ML
INJECTION INTRAMUSCULAR; INTRAVENOUS PRN
Status: DISCONTINUED | OUTPATIENT
Start: 2023-03-01 | End: 2023-03-01 | Stop reason: SDUPTHER

## 2023-03-01 RX ORDER — SODIUM CHLORIDE, SODIUM LACTATE, POTASSIUM CHLORIDE, CALCIUM CHLORIDE 600; 310; 30; 20 MG/100ML; MG/100ML; MG/100ML; MG/100ML
INJECTION, SOLUTION INTRAVENOUS CONTINUOUS PRN
Status: DISCONTINUED | OUTPATIENT
Start: 2023-03-01 | End: 2023-03-01 | Stop reason: SDUPTHER

## 2023-03-01 RX ORDER — SODIUM CHLORIDE 0.9 % (FLUSH) 0.9 %
5-40 SYRINGE (ML) INJECTION EVERY 12 HOURS SCHEDULED
Status: DISCONTINUED | OUTPATIENT
Start: 2023-03-01 | End: 2023-03-01 | Stop reason: HOSPADM

## 2023-03-01 RX ORDER — SODIUM CHLORIDE, SODIUM LACTATE, POTASSIUM CHLORIDE, CALCIUM CHLORIDE 600; 310; 30; 20 MG/100ML; MG/100ML; MG/100ML; MG/100ML
INJECTION, SOLUTION INTRAVENOUS CONTINUOUS
Status: DISCONTINUED | OUTPATIENT
Start: 2023-03-01 | End: 2023-03-01 | Stop reason: HOSPADM

## 2023-03-01 RX ORDER — LIDOCAINE HYDROCHLORIDE 10 MG/ML
1 INJECTION, SOLUTION EPIDURAL; INFILTRATION; INTRACAUDAL; PERINEURAL
Status: DISCONTINUED | OUTPATIENT
Start: 2023-03-01 | End: 2023-03-01 | Stop reason: HOSPADM

## 2023-03-01 RX ADMIN — PROPOFOL 100 MG: 10 INJECTION, EMULSION INTRAVENOUS at 10:03

## 2023-03-01 RX ADMIN — PROPOFOL 50 MG: 10 INJECTION, EMULSION INTRAVENOUS at 10:04

## 2023-03-01 RX ADMIN — PROPOFOL 50 MG: 10 INJECTION, EMULSION INTRAVENOUS at 10:11

## 2023-03-01 RX ADMIN — DEXAMETHASONE SODIUM PHOSPHATE 4 MG: 4 INJECTION, SOLUTION INTRAMUSCULAR; INTRAVENOUS at 10:04

## 2023-03-01 RX ADMIN — LIDOCAINE HYDROCHLORIDE 100 MG: 20 INJECTION, SOLUTION EPIDURAL; INFILTRATION; INTRACAUDAL; PERINEURAL at 10:03

## 2023-03-01 RX ADMIN — SODIUM CHLORIDE, POTASSIUM CHLORIDE, SODIUM LACTATE AND CALCIUM CHLORIDE: 600; 310; 30; 20 INJECTION, SOLUTION INTRAVENOUS at 09:36

## 2023-03-01 RX ADMIN — PROMETHAZINE HYDROCHLORIDE 12.5 MG: 25 INJECTION INTRAMUSCULAR; INTRAVENOUS at 10:04

## 2023-03-01 RX ADMIN — PROPOFOL 50 MG: 10 INJECTION, EMULSION INTRAVENOUS at 10:08

## 2023-03-01 RX ADMIN — DIPHENHYDRAMINE HYDROCHLORIDE 12.5 MG: 50 INJECTION, SOLUTION INTRAMUSCULAR; INTRAVENOUS at 10:04

## 2023-03-01 RX ADMIN — SODIUM CHLORIDE, SODIUM LACTATE, POTASSIUM CHLORIDE, AND CALCIUM CHLORIDE: .6; .31; .03; .02 INJECTION, SOLUTION INTRAVENOUS at 09:53

## 2023-03-01 RX ADMIN — ONDANSETRON 4 MG: 2 INJECTION INTRAMUSCULAR; INTRAVENOUS at 10:12

## 2023-03-01 RX ADMIN — PROPOFOL 50 MG: 10 INJECTION, EMULSION INTRAVENOUS at 10:05

## 2023-03-01 ASSESSMENT — PAIN - FUNCTIONAL ASSESSMENT
PAIN_FUNCTIONAL_ASSESSMENT: 0-10
PAIN_FUNCTIONAL_ASSESSMENT: ACTIVITIES ARE NOT PREVENTED

## 2023-03-01 ASSESSMENT — PAIN DESCRIPTION - DESCRIPTORS: DESCRIPTORS: DISCOMFORT

## 2023-03-01 ASSESSMENT — PAIN SCALES - GENERAL
PAINLEVEL_OUTOF10: 0
PAINLEVEL_OUTOF10: 0

## 2023-03-01 ASSESSMENT — PAIN SCALES - WONG BAKER: WONGBAKER_NUMERICALRESPONSE: 0

## 2023-03-01 NOTE — H&P
History and Physical / Pre-Sedation Assessment    Patient:  Keiko Cary   :   1970     Intended Procedure:  egd    HPI: enriquez    Past Medical History:   has a past medical history of Enriquez's esophagus, Fibromyalgia, Hx of blood clots, Kidney stone, and PONV (postoperative nausea and vomiting). Past Surgical History:   has a past surgical history that includes other surgical history (10/03/2014); Appendectomy; Abdomen surgery; Cholecystectomy; Colonoscopy; Endoscopy, colon, diagnostic; Hysterectomy; Tonsillectomy; Upper gastrointestinal endoscopy (N/A, 2018); Upper gastrointestinal endoscopy (N/A, 2019); Upper gastrointestinal endoscopy (N/A, 2019); Breast surgery (Left); Colonoscopy (2019); Colonoscopy (2019); Upper gastrointestinal endoscopy (N/A, 2019); Upper gastrointestinal endoscopy (N/A, 2019); Upper gastrointestinal endoscopy (N/A, 2021); and Colonoscopy (N/A, 2021). Medications:  Prior to Admission medications    Medication Sig Start Date End Date Taking?  Authorizing Provider   Famotidine-Ca Carb-Mag Hydrox (PEPCID COMPLETE PO) Take by mouth   Yes Historical Provider, MD   traZODone (DESYREL) 50 MG tablet TAKE ONE TABLET BY MOUTH ONCE NIGHTLY AS NEEDED FOR SLEEP  Patient not taking: No sig reported 6/10/22   Júnior Jenkins MD   cyclobenzaprine (FLEXERIL) 10 MG tablet Take 1 tablet by mouth 3 times daily as needed for Muscle spasms 22   Júnior Jenkins MD   meloxicam (MOBIC) 15 MG tablet Take 1 tablet by mouth daily as needed for Pain  Patient not taking: No sig reported 3/9/22   Júnior Jenkins MD   vitamin D (ERGOCALCIFEROL) 1.25 MG (92683 UT) CAPS capsule Take 1 capsule by mouth once a week 3/30/21   Júnior Jenkins MD   pantoprazole (PROTONIX) 40 MG tablet 2 times daily  19   Historical Provider, MD       Family History:  family history includes Breast Cancer in an other family member; Flaco Snell in her maternal grandfather; Diabetes type 2  in her maternal grandfather; Heart Disease in her maternal grandmother and paternal grandmother; Hypertension in her maternal grandfather; Kidney stones in her father; Other in her maternal grandmother; Ovarian Cancer in her paternal grandmother. Social History:   reports that she has never smoked. She has never used smokeless tobacco. She reports that she does not drink alcohol and does not use drugs. Allergies:  Rocephin [ceftriaxone]    ROS:  twelve point system review was unremarkable except for above noted history. Nurses notes reviewed and agreed. Medications reviewed    Physical Exam:  Vital Signs: /71   Pulse 87   Temp 97.1 °F (36.2 °C) (Temporal)   Resp 18   Ht 5' 5\" (1.651 m)   Wt 250 lb (113.4 kg)   LMP 08/30/2015   SpO2 98%   BMI 41.60 kg/m²    Skin: normal  HEENT: normal  Neck: supple. No adenopathy. No thyromegaly. No JVD. Pulmonary:Normal  Cardiac:Normal  Abdomen:Normal  MS: normal  Neuro: normal  Ext: no edema. Pulses normal    Pre-Procedure Assessment / Plan:  ASA 2 - Patient with mild systemic disease with no functional limitations  Mallampati Airway Assessment:  Mallampati Class II - (soft palate, fauces & uvula are visible)  Level of Sedation Plan: Moderate sedation  Post Procedure plan: Return to same level of care    I assessed the patient and find that the patient is in satisfactory condition to proceed with the planned procedure and sedation plan. I have explained the risk, benefits, and alternatives to the procedure. The patient understands and agrees to proceed.   Yes    Ct Tompkins MD  10:00 AM 3/1/2023

## 2023-03-01 NOTE — DISCHARGE INSTRUCTIONS
ENDOSCOPY DISCHARGE INSTRUCTIONS:    Call the physician that did your procedure for any questions or concerns:           DR. Evelio Patehead:  [phone]               ACTIVITY:    There are potential side effects from the medications used for sedation and anesthesia during your procedure. These include:  Dizziness or light-headedness, confusion or memory loss, delayed reaction times, loss of coordination, nausea and vomiting. Because of your increased risk for injury, we ask that you observe the following precautions: For the next 24 hours,  DO NOT operate an automobile, bicycle, motorcycle, , power tools or large equipment of any kind. Do not drink alcohol, sign any legal documents or make any legal decisions for 24 hours. Do not bend your head over lower than your heart. DO sit on the side of bed/couch awhile before getting up. Plan on bedrest or quiet relaxation today. You may resume normal activities in 24 hours. DIET:    Your first meal today should be light, avoiding spicy and fatty foods. If you tolerate this first meal, then you may advance to your regular diet unless otherwise advised by your physician. NORMAL SYMPTOMS:  -Mild sore throat if youve had an EGD   -Gaseous discomfort if you've had an EGD or Colonoscopy. NOTIFY YOUR PHYSICIAN IF THESE SYMPTOMS OCCUR:  1. Fever (greater than 100)  5. Increased abdominal bloating  2. Severe pain    6. Excessive bleeding  3. Nausea and vomiting  7. Chest pain                                                                    4. Chills    8. Shortness of breath      ADDITIONAL INSTRUCTIONS:    Biopsy results: To be discussed at your follow-up visit. Educational Information: Esophageal Biopsy for Villela's, gastric biopsy to rule out H-pylori     Follow up: Schedule an appointment with Dr. Dontrell Donovan for two weeks from now if you have not already done so.       Please review these discharge instructions this evening or tomorrow for  information you may have forgotten. We want to thank you for choosing the [facility] as your health care provider. We always strive to provide you with excellent care while you are here. You may receive a survey in the mail regarding your care. We would appreciate you taking a few minutes of your time to complete this survey. Again, thank you for choosing the [address] [place] [address] Street:    Call the physician that did your procedure for any questions or concerns:           DR. Singh Sit:  [phone]               ACTIVITY:    There are potential side effects from the medications used for sedation and anesthesia during your procedure. These include:  Dizziness or light-headedness, confusion or memory loss, delayed reaction times, loss of coordination, nausea and vomiting. Because of your increased risk for injury, we ask that you observe the following precautions: For the next 24 hours,  DO NOT operate an automobile, bicycle, motorcycle, , power tools or large equipment of any kind. Do not drink alcohol, sign any legal documents or make any legal decisions for 24 hours. Do not bend your head over lower than your heart. DO sit on the side of bed/couch awhile before getting up. Plan on bedrest or quiet relaxation today. You may resume normal activities in 24 hours. DIET:    Your first meal today should be light, avoiding spicy and fatty foods. If you tolerate this first meal, then you may advance to your regular diet unless otherwise advised by your physician. NORMAL SYMPTOMS:  -Mild sore throat if youve had an EGD   -Gaseous discomfort if you've had an EGD or Colonoscopy. NOTIFY YOUR PHYSICIAN IF THESE SYMPTOMS OCCUR:  1. Fever (greater than 100)  5. Increased abdominal bloating  2. Severe pain    6. Excessive bleeding  3. Nausea and vomiting  7. Chest pain                                                                    4. Chills    8.  Shortness of breath      ADDITIONAL INSTRUCTIONS:    Biopsy results: To be discussed at your follow-up visit. Educational Information:    Follow up: Schedule an appointment with Dr. Josemanuel Willingham for two weeks from now if you have not already done so. Please review these discharge instructions this evening or tomorrow for  information you may have forgotten. We want to thank you for choosing the [facility] as your health care provider. We always strive to provide you with excellent care while you are here. You may receive a survey in the mail regarding your care. We would appreciate you taking a few minutes of your time to complete this survey. Again, thank you for choosing the [facility]      Villela's Esophagus: Care Instructions  Overview     The esophagus is the tube that connects the throat to the stomach. Food and liquids go through this tube. In Villela's esophagus, the cells that line the tube change. This is usually because of gastroesophageal reflux disease (GERD). GERD causes acid from your stomach to back up into the esophagus. When you have Villela's esophagus, you are slightly more likely to get cancer of the esophagus. So regular testing is important to watch for signs of this cancer. You can treat GERD to control your symptoms and feel better. Follow-up care is a key part of your treatment and safety. Be sure to make and go to all appointments, and call your doctor if you are having problems. It's also a good idea to know your test results and keep a list of the medicines you take. How can you care for yourself at home? Take your medicines exactly as prescribed. Call your doctor if you think you are having a problem with your medicine. If you take over-the-counter medicine, such as antacids or acid reducers, follow all instructions on the label. If you use these medicines often, talk with your doctor. Be careful when you take over-the-counter antacid medicines.  Many of these medicines have aspirin in them. Read the label to make sure that you are not taking more than the recommended dose. Too much aspirin can be harmful. Do not smoke or chew tobacco. Smoking can make gastroesophageal reflux disease (GERD) worse. If you need help quitting, talk to your doctor about stop-smoking programs and medicines. These can increase your chances of quitting for good. Avoid foods that make your symptoms worse. These may include chocolate, mint, alcohol, pepper, spicy foods, high-fat foods, or drinks with caffeine in them, such as tea, coffee, jayce, or energy drinks. If your symptoms are worse after you eat a certain food, you may want to stop eating it to see if your symptoms get better. Eat smaller meals, and more often. After eating, wait 2 to 3 hours before you lie down. Raise the head of your bed 6 in. (15 cm) to 8 in. (20 cm) by putting blocks under the frame or a foam wedge under the head of the mattress. Adding extra pillows does not work. Do not wear tight clothing around your midsection. Lose weight if you are overweight or obese. Losing just 5 to 10 pounds can help. Go to regular follow-up testing, even if you don't have symptoms. It helps your doctor watch for signs of more changes that may lead to cancer. When should you call for help? Call 911  anytime you think you may need emergency care. For example, call if:    Food or something sharp is stuck in your esophagus and you can't swallow at all. Call your doctor now or seek immediate medical care if:    You have new or worse belly pain. You are vomiting. Watch closely for changes in your health, and be sure to contact your doctor if:    You have new or worse symptoms of reflux. You have any pain or trouble swallowing. You are losing weight. You do not get better as expected. H. Pylori: About This Test  What is it?   H. pylori tests are used to check for a Helicobacter pylori bacteria infection in the stomach and upper part of the small intestine. H. pylori can cause peptic ulcers. But most people with this type of bacteria in their digestive systems do not get ulcers. Different tests may be used to check for an H. pylori infection. Blood antibody test. This checks to see if your body has made antibodies to fight H. pylori bacteria. Urea breath test. It tests your breath to see if you have H. pylori bacteria in your stomach. Stool antigen test. This test looks for substances in your feces (stool) that trigger the immune system to fight an H. pylori infection. (These substances are called H. pylori antigens.)  Stomach biopsy. A small sample (biopsy) is taken from the lining of your stomach and small intestine. The samples are checked for H. pylori. Why is this test done? H. pylori tests are done to:  Find out if an infection with H. pylori bacteria may be causing an ulcer or irritation of the stomach lining (gastritis). Find out if treatment for the infection worked. How do you prepare for the test?  Blood antibody test  You do not need to do anything before you have this test.  Urea breath test, stool antigen test, or stomach biopsy  Medicines you take may change the results of these tests. Be sure to tell your doctor about all the prescription and over-the-counter medicines you take. Your doctor may advise you to stop taking some of your medicines. Urea breath test or stomach biopsy  You will be asked to not eat or drink anything for a certain amount of time before your breath test or stomach biopsy. Follow your doctor's instructions about how long you need to avoid eating and drinking before the test. If you are going to have a stomach biopsy, your doctor will give you instructions on how to prepare. How is the test done? Blood antibody test  A health professional uses a needle to take a blood sample, usually from the arm.   Urea breath test  A breath sample is collected when you blow into a balloon or blow bubbles into a bottle of liquid. The health professional will:  Collect a sample of your breath before the test starts. Give you a capsule or some water to swallow that contains tagged or radioactive material.  Collect more samples of your breath. The samples will be tested to see if they contain material formed when H. pylori comes into contact with the tagged or radioactive material.  Stool antigen test  For this test, you may be asked to collect the stool sample at home. To collect the sample, you need to:  Pass stool into a dry container. Either solid or liquid stools can be collected. Be careful not to get urine or toilet tissue in with the stool sample. Replace the container cap. Label the container with your name, your doctor's name, and the date the sample was collected. Wash your hands well after you collect the sample. Take the sealed container to your doctor's office or to the lab as soon as you can. Stomach biopsy  A procedure called endoscopy is used to collect samples of tissue from the stomach and the first part of the small intestine. The tissue samples are tested in a lab to see if they contain H. pylori. Follow-up care is a key part of your treatment and safety. Be sure to make and go to all appointments, and call your doctor if you are having problems. It's also a good idea to keep a list of the medicines you take. Ask your doctor when you can expect to have your test results.

## 2023-03-01 NOTE — ANESTHESIA PRE PROCEDURE
Department of Anesthesiology  Preprocedure Note       Name:  Gretta Israel   Age:  47 y.o.  :  1970                                          MRN:  [id number]         Date:  3/1/2023      Surgeon: Rosalia Ramos):  Aldo Mahmood MD    Procedure: Procedure(s):  ESOPHAGOGASTRODUODENOSCOPY    Medications prior to admission:   Prior to Admission medications    Medication Sig Start Date End Date Taking? Authorizing Provider   traZODone (DESYREL) 50 MG tablet TAKE ONE TABLET BY MOUTH ONCE NIGHTLY AS NEEDED FOR SLEEP  Patient not taking: Reported on 2023 6/10/22   Jacque Comment, MD   cyclobenzaprine (FLEXERIL) 10 MG tablet Take 1 tablet by mouth 3 times daily as needed for Muscle spasms 22   Jacque Comment, MD   meloxicam JUAN GIMENEZ [facility]) 15 MG tablet Take 1 tablet by mouth daily as needed for Pain  Patient not taking: Reported on 2023 3/9/22   Jacque Beverly MD   vitamin D (ERGOCALCIFEROL) 1.25 MG (65205 UT) CAPS capsule Take 1 capsule by mouth once a week 3/30/21   Jacque Comment, MD   pantoprazole (PROTONIX) 40 MG tablet 2 times daily  19   Historical Provider, MD       Current medications:    No current facility-administered medications for this encounter. Allergies:     Allergies   Allergen Reactions    Rocephin [Ceftriaxone] Rash       Problem List:    Patient Active Problem List   Diagnosis Code    Sepsis ([facility] [address] (WBC 19.8K, ) due to pyelonephritis A41.9    Hiatal hernia with GERD and esophagitis/Villela's K44.9, K21.00    Recurrent nephrolithiasis N20.0    COVID-19 U07.1       Past Medical History:        Diagnosis Date    Villela's esophagus     Fibromyalgia     Hx of blood clots     has 3 blood clotting factors  liden factor 5, MTHFR, ProteinS    Kidney stone     PONV (postoperative nausea and vomiting)     AND ITCHING       Past Surgical History:        Procedure Laterality Date    ABDOMEN SURGERY      cholecystectomy, 3 c sections, tubaligations, hysterectomy    APPENDECTOMY      BREAST SURGERY Left     benign 3 times- benign cysts, Nov 2016 benign lumpectomy    CHOLECYSTECTOMY      COLONOSCOPY      COLONOSCOPY  08/30/2019    COLONOSCOPY WITH BIOPSY performed by Gina Webber MD at [address]  08/30/2019    COLONOSCOPY POLYPECTOMY HOT BIOPSY performed by Gina Webber MD at [address] N/A 2/5/2021    COLONOSCOPY POLYPECTOMY SNARE/COLD BIOPSY performed by Gina Webber MD at [facility], DIAGNOSTIC     [address] [facility] (CERVIX STATUS UNKNOWN)      OTHER SURGICAL HISTORY  10/03/2014    Push Enteroscopy with small bowel biopsy    TONSILLECTOMY      UPPER GASTROINTESTINAL ENDOSCOPY N/A 08/17/2018    EGD WITH ESOPHAGEAL  BIOPSY FOR COATES'S AND GASTRIC BIOPSY FOR H PYLORI performed by Gina Webber MD [address] N/A 04/16/2019    EGD BIOPSY performed by Gina Webber MD at [facility] ice 05/28/2019    EGD BIOPSY performed by Gina Webber MD at [facility] ice 11/05/2019    EGD BIOPSY performed by Gina Webber MD at [facility] ice N/A 12/13/2019    EGD BIOPSY performed by Gina Webber MD at [facility] ice N/A 2/5/2021    ESOPHAGOGASTRODUODENOSCOPY performed by Gina Webber MD at [facility] History:    Social History     Tobacco Use    Smoking status: Never    Smokeless tobacco: Never   Substance Use Topics    Alcohol use:  No                                Counseling given: Not Answered      Vital Signs (Current):   Vitals:    03/01/23 0858   BP: 127/71   Pulse: 87   Resp: 18   Temp: 97.1 °F (36.2 °C)   TempSrc: Temporal   SpO2: 98%   Weight: 250 lb (113.4 kg)   Height: 5' 5\" (1.651 m)                                              BP Readings from Last 3 Encounters:   03/01/23 127/71   01/04/23 128/72   03/25/22 124/84       NPO Status:                                                                                 BMI:   Wt Readings from Last 3 Encounters:   03/01/23 250 lb (113.4 kg)   01/04/23 255 lb (115.7 kg)   03/25/22 259 lb (117.5 kg)     Body mass index is 41.6 kg/m². CBC:   Lab Results   Component Value Date/Time    WBC 7.7 02/09/2022 11:05 AM    RBC 4.69 02/09/2022 11:05 AM    HGB 13.6 02/09/2022 11:05 AM    HCT 40.8 02/09/2022 11:05 AM    MCV 86.9 02/09/2022 11:05 AM    RDW 14.5 02/09/2022 11:05 AM     02/09/2022 11:05 AM       CMP:   Lab Results   Component Value Date/Time     02/09/2022 11:05 AM    K 4.9 02/09/2022 11:05 AM     02/09/2022 11:05 AM    CO2 25 02/09/2022 11:05 AM    BUN 12 02/09/2022 11:05 AM    CREATININE 0.8 02/09/2022 11:05 AM    GFRAA >60 02/09/2022 11:05 AM    AGRATIO 1.6 03/29/2021 09:37 AM    LABGLOM >60 02/09/2022 11:05 AM    GLUCOSE 96 02/09/2022 11:05 AM    PROT 7.2 02/09/2022 11:05 AM    CALCIUM 9.7 02/09/2022 11:05 AM    BILITOT 0.6 02/09/2022 11:05 AM    ALKPHOS 61 02/09/2022 11:05 AM    AST 51 02/09/2022 11:05 AM    ALT 64 02/09/2022 11:05 AM       POC Tests: No results for input(s): POCGLU, POCNA, POCK, POCCL, POCBUN, POCHEMO, POCHCT in the last 72 hours.     Coags: No results found for: PROTIME, INR, APTT    HCG (If Applicable):   Lab Results   Component Value Date    PREGTESTUR Negative 11/07/2020        ABGs: No results found for: PHART, PO2ART, VSP2XPP, LNY0TCA, BEART, N1UPXQYE     Type & Screen (If Applicable):  No results found for: LABABO, LABRH    Drug/Infectious Status (If Applicable):  No results found for: HIV, HEPCAB    COVID-19 Screening (If Applicable):   Lab Results   Component Value Date/Time    COVID19 Not Detected 02/01/2021 02:16 PM           Anesthesia Evaluation   history of anesthetic complications:   Airway: Mallampati: II  TM distance: >3 FB   Neck ROM: full  Mouth opening: > = 3 FB   Dental:          Pulmonary: Cardiovascular:            Rhythm: regular  Rate: normal                    Neuro/Psych:   (+) neuromuscular disease:,             GI/Hepatic/Renal:   (+) GERD:,           Endo/Other:                     Abdominal:             Vascular: Other Findings:           Anesthesia Plan      MAC     ASA 3       Induction: intravenous. Anesthetic plan and risks discussed with patient. Plan discussed with CRNA.                     Catalino Montgomery MD   3/1/2023

## 2023-03-01 NOTE — PROGRESS NOTES
Ambulatory Surgery/Procedure Discharge Note    Vitals:    03/01/23 1040   BP: 128/79   Pulse: 73   Resp: 20   Temp:    SpO2: 97%       In: 250 [I.V.:250]  Out: -     Restroom use offered before discharge. Yes, pt ambulated to bathroom, assist x1. Pain assessment:  level of pain (1-10, 10 severe)  Pain Level: 0  Pt to Endoscopy recovery post EGD. Upon arrival to unit pt noted to be coughing, head of bed raised and oral suction administered. Pt c/o throat discomfort. This writer offered to order throat lozenge from pharmacy and ice chips, pt declined. Pt denies nausea at this time, pt tolerating PO fluids well. Discharge instructions given to pt's mother and she states understanding of these instructions. Pt and pt's mother state that pt is \"ready to go. \"         Patient discharged to home/self care.  Patient discharged via wheel chair by transporter to waiting family/S.O.       3/1/2023 11:57 AM

## 2023-03-01 NOTE — ANESTHESIA POSTPROCEDURE EVALUATION
Department of Anesthesiology  Postprocedure Note    Patient: Tamie Balderas  MRN: [id number]  YOB: 1970  Date of evaluation: 3/1/2023      Procedure Summary     Date: 03/01/23 Room / Location: [facility] 01 / [facility]    Anesthesia Start: 3495 Anesthesia Stop: 1022    Procedure: EGD BIOPSY Diagnosis:       Villela's esophagus without dysplasia      (Villela's esophagus without dysplasia [K22.70])    Surgeons: Kiera Minor MD Responsible Provider: Kelley Fonseca MD    Anesthesia Type: MAC ASA Status: 3          Anesthesia Type: No value filed.     Salomon Phase I: Salomon Score: 10    Salomon Phase II: Salomon Score: 5      Anesthesia Post Evaluation    Patient location during evaluation: PACU  Level of consciousness: awake  Complications: no  Multimodal analgesia pain management approach

## 2023-03-02 NOTE — OP NOTE
[address]                                OPERATIVE REPORT    PATIENT NAME: Christy Mejía                      :        1970  MED REC NO:   [id number]                          ROOM:  ACCOUNT NO:   [de-identified]                           ADMIT DATE: 2023  PROVIDER:     Gurmeet Fair MD    DATE OF PROCEDURE:  2023    SURGEON:  Gurmeet Fair MD    INDICATION FOR PROCEDURE:  Villela's esophagus. At some point, the  biopsies revealed indefinite for dysplasia. The patient does have  intermittent dyspepsia. DESCRIPTION OF PROCEDURE:  With the patient in the left lateral position  and after IV Diprivan, the Olympus video endoscope was introduced into  the esophagus and advanced toward the gastroesophageal junction where  small hiatus hernia and Villela's esophagus were seen. Several biopsies  were obtained from Villela's. Stomach was carefully inspected. Minor  antral gastritis was seen and biopsies were obtained for Helicobacter  pylori. The duodenum was normal.  No other abnormality. Scope was then  removed without complication. IMPRESSION:  1. Small hiatus hernia. 2.  Villela's esophagus. 3.  Minor antral gastritis. ESTIMATED BLOOD LOSS:  None. Hilda Bonilla MD    D: 2023 10:33:27       T: 2023 15:54:52     [email or web address]/[email or web address]  Job#: [id number]     Doc#: [id number]    CC:   MD Albaro Art MD

## 2023-03-24 ENCOUNTER — ANESTHESIA (OUTPATIENT)
Dept: ENDOSCOPY | Age: 53
End: 2023-03-24
Payer: COMMERCIAL

## 2023-03-24 ENCOUNTER — ANESTHESIA EVENT (OUTPATIENT)
Dept: ENDOSCOPY | Age: 53
End: 2023-03-24
Payer: COMMERCIAL

## 2023-03-24 ENCOUNTER — HOSPITAL ENCOUNTER (OUTPATIENT)
Age: 53
Setting detail: OUTPATIENT SURGERY
Discharge: HOME | End: 2023-03-24
Attending: INTERNAL MEDICINE | Admitting: INTERNAL MEDICINE
Payer: COMMERCIAL

## 2023-03-24 VITALS
HEIGHT: 65 IN | DIASTOLIC BLOOD PRESSURE: 80 MMHG | HEART RATE: 82 BPM | TEMPERATURE: 97.1 F | SYSTOLIC BLOOD PRESSURE: 139 MMHG | BODY MASS INDEX: 41.65 KG/M2 | OXYGEN SATURATION: 96 % | WEIGHT: 250 LBS | RESPIRATION RATE: 16 BRPM

## 2023-03-24 DIAGNOSIS — K22.70 BARRETT'S ESOPHAGUS WITHOUT DYSPLASIA: ICD-10-CM

## 2023-03-24 PROCEDURE — 2580000003 HC RX 258: Performed by: ANESTHESIOLOGY

## 2023-03-24 PROCEDURE — 3609012400 HC EGD TRANSORAL BIOPSY SINGLE/MULTIPLE: Performed by: INTERNAL MEDICINE

## 2023-03-24 PROCEDURE — 6360000002 HC RX W HCPCS: Performed by: NURSE ANESTHETIST, CERTIFIED REGISTERED

## 2023-03-24 PROCEDURE — 7100000010 HC PHASE II RECOVERY - FIRST 15 MIN: Performed by: INTERNAL MEDICINE

## 2023-03-24 PROCEDURE — 6360000002 HC RX W HCPCS: Performed by: ANESTHESIOLOGY

## 2023-03-24 PROCEDURE — 3700000001 HC ADD 15 MINUTES (ANESTHESIA): Performed by: INTERNAL MEDICINE

## 2023-03-24 PROCEDURE — 3609013200 HC EGD W/ ABLATION: Performed by: INTERNAL MEDICINE

## 2023-03-24 PROCEDURE — 3700000000 HC ANESTHESIA ATTENDED CARE: Performed by: INTERNAL MEDICINE

## 2023-03-24 PROCEDURE — 88305 TISSUE EXAM BY PATHOLOGIST: CPT

## 2023-03-24 PROCEDURE — 2500000003 HC RX 250 WO HCPCS: Performed by: NURSE ANESTHETIST, CERTIFIED REGISTERED

## 2023-03-24 PROCEDURE — 7100000011 HC PHASE II RECOVERY - ADDTL 15 MIN: Performed by: INTERNAL MEDICINE

## 2023-03-24 PROCEDURE — C1888 ENDOVAS NON-CARDIAC ABL CATH: HCPCS | Performed by: INTERNAL MEDICINE

## 2023-03-24 PROCEDURE — 2709999900 HC NON-CHARGEABLE SUPPLY: Performed by: INTERNAL MEDICINE

## 2023-03-24 RX ORDER — LIDOCAINE HYDROCHLORIDE 20 MG/ML
INJECTION, SOLUTION INFILTRATION; PERINEURAL PRN
Status: DISCONTINUED | OUTPATIENT
Start: 2023-03-24 | End: 2023-03-24 | Stop reason: SDUPTHER

## 2023-03-24 RX ORDER — PROPOFOL 10 MG/ML
INJECTION, EMULSION INTRAVENOUS CONTINUOUS PRN
Status: DISCONTINUED | OUTPATIENT
Start: 2023-03-24 | End: 2023-03-24 | Stop reason: SDUPTHER

## 2023-03-24 RX ORDER — DIPHENHYDRAMINE HYDROCHLORIDE 50 MG/ML
12.5 INJECTION INTRAMUSCULAR; INTRAVENOUS ONCE
Status: COMPLETED | OUTPATIENT
Start: 2023-03-24 | End: 2023-03-24

## 2023-03-24 RX ORDER — PROPOFOL 10 MG/ML
INJECTION, EMULSION INTRAVENOUS PRN
Status: DISCONTINUED | OUTPATIENT
Start: 2023-03-24 | End: 2023-03-24 | Stop reason: SDUPTHER

## 2023-03-24 RX ORDER — PROMETHAZINE HYDROCHLORIDE 25 MG/ML
12.5 INJECTION, SOLUTION INTRAMUSCULAR; INTRAVENOUS ONCE
Status: COMPLETED | OUTPATIENT
Start: 2023-03-24 | End: 2023-03-24

## 2023-03-24 RX ORDER — SODIUM CHLORIDE, SODIUM LACTATE, POTASSIUM CHLORIDE, CALCIUM CHLORIDE 600; 310; 30; 20 MG/100ML; MG/100ML; MG/100ML; MG/100ML
INJECTION, SOLUTION INTRAVENOUS CONTINUOUS
Status: DISCONTINUED | OUTPATIENT
Start: 2023-03-24 | End: 2023-03-24 | Stop reason: HOSPADM

## 2023-03-24 RX ORDER — DEXAMETHASONE SODIUM PHOSPHATE 4 MG/ML
INJECTION, SOLUTION INTRA-ARTICULAR; INTRALESIONAL; INTRAMUSCULAR; INTRAVENOUS; SOFT TISSUE PRN
Status: DISCONTINUED | OUTPATIENT
Start: 2023-03-24 | End: 2023-03-24 | Stop reason: SDUPTHER

## 2023-03-24 RX ADMIN — PROPOFOL 50 MG: 10 INJECTION, EMULSION INTRAVENOUS at 10:29

## 2023-03-24 RX ADMIN — LIDOCAINE HYDROCHLORIDE 100 MG: 20 INJECTION, SOLUTION INFILTRATION; PERINEURAL at 10:33

## 2023-03-24 RX ADMIN — DIPHENHYDRAMINE HYDROCHLORIDE 12.5 MG: 50 INJECTION, SOLUTION INTRAMUSCULAR; INTRAVENOUS at 09:50

## 2023-03-24 RX ADMIN — DEXAMETHASONE SODIUM PHOSPHATE 4 MG: 4 INJECTION, SOLUTION INTRAMUSCULAR; INTRAVENOUS at 10:33

## 2023-03-24 RX ADMIN — LIDOCAINE HYDROCHLORIDE 100 MG: 20 INJECTION, SOLUTION INFILTRATION; PERINEURAL at 10:24

## 2023-03-24 RX ADMIN — PROPOFOL 50 MG: 10 INJECTION, EMULSION INTRAVENOUS at 10:25

## 2023-03-24 RX ADMIN — PROMETHAZINE HYDROCHLORIDE 12.5 MG: 25 INJECTION INTRAMUSCULAR; INTRAVENOUS at 09:51

## 2023-03-24 RX ADMIN — PROPOFOL 100 MG: 10 INJECTION, EMULSION INTRAVENOUS at 10:24

## 2023-03-24 RX ADMIN — PROPOFOL 150 MCG/KG/MIN: 10 INJECTION, EMULSION INTRAVENOUS at 10:24

## 2023-03-24 RX ADMIN — SODIUM CHLORIDE, POTASSIUM CHLORIDE, SODIUM LACTATE AND CALCIUM CHLORIDE: 600; 310; 30; 20 INJECTION, SOLUTION INTRAVENOUS at 09:44

## 2023-03-24 ASSESSMENT — PAIN - FUNCTIONAL ASSESSMENT
PAIN_FUNCTIONAL_ASSESSMENT: NONE - DENIES PAIN
PAIN_FUNCTIONAL_ASSESSMENT: ACTIVITIES ARE NOT PREVENTED

## 2023-03-24 ASSESSMENT — PAIN DESCRIPTION - FREQUENCY: FREQUENCY: CONTINUOUS

## 2023-03-24 ASSESSMENT — PAIN SCALES - GENERAL: PAINLEVEL_OUTOF10: 7

## 2023-03-24 ASSESSMENT — PAIN DESCRIPTION - DESCRIPTORS: DESCRIPTORS: ACHING

## 2023-03-24 ASSESSMENT — PAIN DESCRIPTION - LOCATION: LOCATION: THROAT;ABDOMEN

## 2023-03-24 NOTE — H&P
History and Physical / Pre-Sedation Assessment    Patient:  Kizzy Soto   :   1970     Intended Procedure:  egd and rfa    HPI: enriquez esophagus    Past Medical History:   has a past medical history of Enriquez's esophagus, Fibromyalgia, Hx of blood clots, Kidney stone, and PONV (postoperative nausea and vomiting). Past Surgical History:   has a past surgical history that includes other surgical history (10/03/2014); Appendectomy; Abdomen surgery; Cholecystectomy; Colonoscopy; Endoscopy, colon, diagnostic; Hysterectomy; Tonsillectomy; Upper gastrointestinal endoscopy (N/A, 2018); Upper gastrointestinal endoscopy (N/A, 2019); Upper gastrointestinal endoscopy (N/A, 2019); Breast surgery (Left); Colonoscopy (2019); Colonoscopy (2019); Upper gastrointestinal endoscopy (N/A, 2019); Upper gastrointestinal endoscopy (N/A, 2019); Upper gastrointestinal endoscopy (N/A, 2021); Colonoscopy (N/A, 2021); and Upper gastrointestinal endoscopy (N/A, 3/1/2023). Medications:  Prior to Admission medications    Medication Sig Start Date End Date Taking? Authorizing Provider   Famotidine-Ca Carb-Mag Hydrox (PEPCID COMPLETE PO) Take by mouth    Historical Provider, MD   cyclobenzaprine (FLEXERIL) 10 MG tablet Take 1 tablet by mouth 3 times daily as needed for Muscle spasms 22   Connie Nixon MD   vitamin D (ERGOCALCIFEROL) 1.25 MG (69851 UT) CAPS capsule Take 1 capsule by mouth once a week 3/30/21   Connie Nixon MD   pantoprazole (PROTONIX) 40 MG tablet 2 times daily  19   Historical Provider, MD       Family History:  family history includes Breast Cancer in an other family member; Bootjack Going in her maternal grandfather; Diabetes type 2  in her maternal grandfather; Heart Disease in her maternal grandmother and paternal grandmother; Hypertension in her maternal grandfather; Kidney stones in her father;  Other in her maternal grandmother; Ovarian Cancer in her paternal grandmother. Social History:   reports that she has never smoked. She has never used smokeless tobacco. She reports that she does not drink alcohol and does not use drugs. Allergies:  Rocephin [ceftriaxone]    ROS:  twelve point system review was unremarkable except for above noted history. Nurses notes reviewed and agreed. Medications reviewed    Physical Exam:  Vital Signs: /78   Pulse 87   Temp 97.1 °F (36.2 °C) (Temporal)   Resp 16   LMP 08/30/2015   SpO2 97%    Skin: normal  HEENT: normal  Neck: supple. No adenopathy. No thyromegaly. No JVD. Pulmonary:Normal  Cardiac:Normal  Abdomen:Normal  MS: normal  Neuro: normal  Ext: no edema. Pulses normal    Pre-Procedure Assessment / Plan:  ASA 2 - Patient with mild systemic disease with no functional limitations  Mallampati Airway Assessment:  Mallampati Class II - (soft palate, fauces & uvula are visible)  Level of Sedation Plan: Moderate sedation  Post Procedure plan: Return to same level of care    I assessed the patient and find that the patient is in satisfactory condition to proceed with the planned procedure and sedation plan. I have explained the risk, benefits, and alternatives to the procedure. The patient understands and agrees to proceed.   Yes    Benoit Spencer MD  10:46 AM 3/24/2023

## 2023-03-24 NOTE — ANESTHESIA PRE PROCEDURE
ABDOMEN SURGERY      cholecystectomy, 3 c sections, tubaligations, hysterectomy    APPENDECTOMY      BREAST SURGERY Left     benign 3 times- benign cysts, Nov 2016 benign lumpectomy    CHOLECYSTECTOMY      COLONOSCOPY      COLONOSCOPY  08/30/2019    COLONOSCOPY WITH BIOPSY performed by Junie Dumas MD at [address]  08/30/2019    COLONOSCOPY POLYPECTOMY HOT BIOPSY performed by Junie Dumas MD at [address] N/A 2/5/2021    COLONOSCOPY POLYPECTOMY SNARE/COLD BIOPSY performed by Junie Dumas MD at [facility]NOSTIC     [address] [facility] ([facility])      OTHER SURGICAL HISTORY  10/03/2014    Push Enteroscopy with small bowel biopsy    TONSILLECTOMY      UPPER GASTROINTESTINAL ENDOSCOPY N/A 08/17/2018    EGD WITH ESOPHAGEAL  BIOPSY FOR COATES'S AND GASTRIC BIOPSY FOR H PYLORI performed by Junie Dumas MD at [address] [place] [address] [address] N/A 04/16/2019    EGD BIOPSY performed by Junie Dumas MD at [address] [place] [address] [facility] N/A 05/28/2019    EGD BIOPSY performed by Junie Dumas MD at [facility] [employer] 11/05/2019    EGD BIOPSY performed by Junie Dumas MD at [facility] 12/13/2019    EGD BIOPSY performed by Junie Dumas MD at [address] [place] [address] [facility] N/A 2/5/2021    ESOPHAGOGASTRODUODENOSCOPY performed by Junie Dumas MD at [address] [place] [address] [address] N/A 3/1/2023    EGD BIOPSY performed by Junie Dumas MD at [address] [facility] History:    Social History     Tobacco Use    Smoking status: Never    Smokeless tobacco: Never   Substance Use Topics    Alcohol use: No                                Counseling given: Not Answered      Vital Signs (Current): There were no vitals filed for this visit.                                            BP Readings

## 2023-03-24 NOTE — OP NOTE
[place] [address]                                OPERATIVE REPORT    PATIENT NAME: Isra Dotson                      :        1970  MED REC NO:   [id number]                          ROOM:  ACCOUNT NO:   [de-identified]                           ADMIT DATE: 2023  PROVIDER:     Sintia Carreno MD    DATE OF PROCEDURE:  2023    SURGEON:  Sintia Carreno MD    INDICATION FOR PROCEDURE:  Radiofrequency ablation of Villela's  esophagus. DESCRIPTION OF PROCEDURE:  With the patient in the left lateral position  and after IV Diprivan, the Olympus video endoscope was introduced into  the esophagus and advanced towards the gastroesophageal junction where  small hiatus hernia and Villela's esophagus was seen. Radiofrequency  ablation was then performed using TTS electrode. Stomach was carefully  inspected. Minor antral gastritis was seen, and biopsies were obtained  for Helicobacter pylori. The duodenum was normal.  Scope was then  removed without complication. IMPRESSION:  1. Small hiatus hernia. 2.  Villela's esophagus. Radiofrequency ablation was performed. 3.  Minimal antral gastritis. ESTIMATED BLOOD LOSS:  None. Michael Mccormick MD    D: 2023 10:52:16       T: 2023 12:18:23     SANTOSH/KAROLINA_YASIR_MATILDE  Job#: [id number]     Doc#: [id number]    CC:   Sintia Carreno MD

## 2023-03-24 NOTE — ANESTHESIA POSTPROCEDURE EVALUATION
Department of Anesthesiology  Postprocedure Note    Patient: Partha Villavicencio  MRN: [id number]  YOB: 1970  Date of evaluation: 3/24/2023      Procedure Summary     Date: 03/24/23 Room / Location: [address] [place] [address]    Anesthesia Start: 1001 Anesthesia Stop: 8611    Procedures:       ESOPHAGOGASTRODUODENOSCOPY  RADIOFREQUENCY ABLATION      EGD BIOPSY Diagnosis:       Villela's esophagus without dysplasia      (Villela's esophagus without dysplasia [K22.70])    Surgeons: Lucia Conn MD Responsible Provider: Saw Lynne MD    Anesthesia Type: MAC ASA Status: 3          Anesthesia Type: No value filed.     Salomon Phase I: Salomon Score: 10    Salomon Phase II: Salomon Score: 10      Anesthesia Post Evaluation    Patient location during evaluation: PACU  Patient participation: complete - patient participated  Level of consciousness: awake  Pain score: 0  Airway patency: patent  Nausea & Vomiting: no nausea  Complications: no  Cardiovascular status: hemodynamically stable  Respiratory status: acceptable  Hydration status: stable

## 2023-03-24 NOTE — PROGRESS NOTES
Ambulatory Surgery/Procedure Discharge Note    Vitals:    03/24/23 1125   BP: 139/80   Pulse: 82   Resp: 16   Temp:    SpO2: 96%       In: 200 [P.O.:200]  Out: -     Restroom use offered before discharge. Yes    Pain assessment:  level of pain (1-10, 10 severe), 7  Pain Level: 7    Pt states readiness to discharge home. No complaints of nausea. Pt tolerating PO. Discharge instructions discussed with patient and patient's mom, Michelle Schreiber. Dr. Lynsey Ellis at bedside in recovery. Patient discharged to home/self care.  Patient discharged via wheel chair by transporter to waiting family/S.O.       3/24/2023 12:01 PM

## 2023-03-24 NOTE — DISCHARGE INSTRUCTIONS
ENDOSCOPY DISCHARGE INSTRUCTIONS:    Call the physician that did your procedure for any questions or concerns:           DR. Adalid Bob:  [phone]               ACTIVITY:    There are potential side effects from the medications used for sedation and anesthesia during your procedure. These include:  Dizziness or light-headedness, confusion or memory loss, delayed reaction times, loss of coordination, nausea and vomiting. Because of your increased risk for injury, we ask that you observe the following precautions: For the next 24 hours,  DO NOT operate an automobile, bicycle, motorcycle, , power tools or large equipment of any kind. Do not drink alcohol, sign any legal documents or make any legal decisions for 24 hours. Do not bend your head over lower than your heart. DO sit on the side of bed/couch awhile before getting up. Plan on bedrest or quiet relaxation today. You may resume normal activities in 24 hours. DIET:    Your first meal today should be light, avoiding spicy and fatty foods. If you tolerate this first meal, then you may advance to your regular diet unless otherwise advised by your physician. NORMAL SYMPTOMS:  -Mild sore throat if youve had an EGD   -Gaseous discomfort if you've had an EGD or Colonoscopy. NOTIFY YOUR PHYSICIAN IF THESE SYMPTOMS OCCUR:  1. Fever (greater than 100)  5. Increased abdominal bloating  2. Severe pain    6. Excessive bleeding  3. Nausea and vomiting  7. Chest pain                                                                    4. Chills    8. Shortness of breath      ADDITIONAL INSTRUCTIONS:    Biopsy results: To be discussed at your follow-up visit. Educational Information:    Follow up: Schedule an appointment with Dr. Jahaira Schuster for two weeks from now if you have not already done so. Please review these discharge instructions this evening or tomorrow for  information you may have forgotten.             We want to thank you for choosing the [facility] as your health care provider. We always strive to provide you with excellent care while you are here. You may receive a survey in the mail regarding your care. We would appreciate you taking a few minutes of your time to complete this survey.  Again, thank you for choosing the [facility]

## 2023-04-24 ENCOUNTER — HOSPITAL ENCOUNTER (OUTPATIENT)
Dept: GENERAL RADIOLOGY | Age: 53
Discharge: HOME | End: 2023-04-24
Payer: COMMERCIAL

## 2023-04-24 ENCOUNTER — OFFICE VISIT (OUTPATIENT)
Dept: INTERNAL MEDICINE CLINIC | Age: 53
End: 2023-04-24
Payer: COMMERCIAL

## 2023-04-24 VITALS
BODY MASS INDEX: 43.15 KG/M2 | HEIGHT: 65 IN | SYSTOLIC BLOOD PRESSURE: 137 MMHG | WEIGHT: 259 LBS | HEART RATE: 79 BPM | DIASTOLIC BLOOD PRESSURE: 66 MMHG

## 2023-04-24 DIAGNOSIS — M79.89 SWELLING OF RIGHT HAND: ICD-10-CM

## 2023-04-24 DIAGNOSIS — M79.89 SWELLING OF RIGHT HAND: Primary | ICD-10-CM

## 2023-04-24 PROCEDURE — 3017F COLORECTAL CA SCREEN DOC REV: CPT | Performed by: HOSPITALIST

## 2023-04-24 PROCEDURE — G8417 CALC BMI ABV UP PARAM F/U: HCPCS | Performed by: HOSPITALIST

## 2023-04-24 PROCEDURE — 73130 X-RAY EXAM OF HAND: CPT

## 2023-04-24 PROCEDURE — 1036F TOBACCO NON-USER: CPT | Performed by: HOSPITALIST

## 2023-04-24 PROCEDURE — G8427 DOCREV CUR MEDS BY ELIG CLIN: HCPCS | Performed by: HOSPITALIST

## 2023-04-24 PROCEDURE — 99213 OFFICE O/P EST LOW 20 MIN: CPT | Performed by: HOSPITALIST

## 2023-04-24 RX ORDER — MELOXICAM 15 MG/1
15 TABLET ORAL DAILY
Qty: 30 TABLET | Refills: 0 | Status: SHIPPED | OUTPATIENT
Start: 2023-04-24

## 2023-04-24 RX ORDER — PREDNISONE 20 MG/1
20 TABLET ORAL DAILY
Qty: 5 TABLET | Refills: 0 | Status: SHIPPED | OUTPATIENT
Start: 2023-04-24 | End: 2023-04-29

## 2023-04-24 SDOH — ECONOMIC STABILITY: HOUSING INSECURITY
IN THE LAST 12 MONTHS, WAS THERE A TIME WHEN YOU DID NOT HAVE A STEADY PLACE TO SLEEP OR SLEPT IN A SHELTER (INCLUDING NOW)?: NO

## 2023-04-24 SDOH — ECONOMIC STABILITY: FOOD INSECURITY: WITHIN THE PAST 12 MONTHS, YOU WORRIED THAT YOUR FOOD WOULD RUN OUT BEFORE YOU GOT MONEY TO BUY MORE.: NEVER TRUE

## 2023-04-24 SDOH — ECONOMIC STABILITY: INCOME INSECURITY: HOW HARD IS IT FOR YOU TO PAY FOR THE VERY BASICS LIKE FOOD, HOUSING, MEDICAL CARE, AND HEATING?: NOT HARD AT ALL

## 2023-04-24 SDOH — ECONOMIC STABILITY: FOOD INSECURITY: WITHIN THE PAST 12 MONTHS, THE FOOD YOU BOUGHT JUST DIDN'T LAST AND YOU DIDN'T HAVE MONEY TO GET MORE.: NEVER TRUE

## 2023-04-24 ASSESSMENT — PATIENT HEALTH QUESTIONNAIRE - PHQ9
SUM OF ALL RESPONSES TO PHQ QUESTIONS 1-9: 0
2. FEELING DOWN, DEPRESSED OR HOPELESS: 0
SUM OF ALL RESPONSES TO PHQ QUESTIONS 1-9: 0
SUM OF ALL RESPONSES TO PHQ9 QUESTIONS 1 & 2: 0
SUM OF ALL RESPONSES TO PHQ QUESTIONS 1-9: 0
SUM OF ALL RESPONSES TO PHQ QUESTIONS 1-9: 0
1. LITTLE INTEREST OR PLEASURE IN DOING THINGS: 0

## 2023-04-24 NOTE — PROGRESS NOTES
atraumatic. Right Ear: Tympanic membrane, ear canal and external ear normal. There is no impacted cerumen. Left Ear: Tympanic membrane, ear canal and external ear normal. There is no impacted cerumen. Nose:      Comments: Nasal mucosa is moderately swollen and mildly inflamed     Mouth/Throat:      Mouth: Mucous membranes are moist.      Pharynx: Oropharynx is clear. No oropharyngeal exudate or posterior oropharyngeal erythema. Eyes:      General: No scleral icterus. Pupils: Pupils are equal, round, and reactive to light. Neck:      Vascular: No carotid bruit or JVD. Cardiovascular:      Rate and Rhythm: Normal rate and regular rhythm. Heart sounds: Normal heart sounds. No murmur heard. No friction rub. No gallop. Pulmonary:      Effort: Pulmonary effort is normal. No respiratory distress. Breath sounds: Normal breath sounds. No wheezing or rales. Abdominal:      General: Bowel sounds are normal. There is no distension. Palpations: Abdomen is soft. Tenderness: There is no abdominal tenderness. There is no right CVA tenderness or left CVA tenderness. Musculoskeletal:         General: Swelling (There is right hand swollen, including her MCPs, PIPs and DIPs. There is  tenderness to palpation of her MCPs) present. No tenderness. Normal range of motion. Cervical back: Normal range of motion and neck supple. Right lower leg: No edema. Left lower leg: No edema. Lymphadenopathy:      Cervical: No cervical adenopathy. Skin:     General: Skin is warm and dry. Findings: No erythema, lesion or rash. Neurological:      Mental Status: She is alert and oriented to person, place, and time. Cranial Nerves: No cranial nerve deficit. Gait: Gait normal.      Deep Tendon Reflexes: Reflexes normal.   Psychiatric:         Mood and Affect: Mood normal.       Assessment/ plan:     Angie was seen today for swelling.     Diagnoses and all orders for this

## 2023-05-23 ENCOUNTER — HOSPITAL ENCOUNTER (OUTPATIENT)
Age: 53
Setting detail: OUTPATIENT SURGERY
Discharge: HOME | End: 2023-05-23
Attending: INTERNAL MEDICINE | Admitting: INTERNAL MEDICINE
Payer: COMMERCIAL

## 2023-05-23 ENCOUNTER — ANESTHESIA EVENT (OUTPATIENT)
Dept: ENDOSCOPY | Age: 53
End: 2023-05-23
Payer: COMMERCIAL

## 2023-05-23 ENCOUNTER — ANESTHESIA (OUTPATIENT)
Dept: ENDOSCOPY | Age: 53
End: 2023-05-23
Payer: COMMERCIAL

## 2023-05-23 VITALS
SYSTOLIC BLOOD PRESSURE: 113 MMHG | DIASTOLIC BLOOD PRESSURE: 66 MMHG | OXYGEN SATURATION: 97 % | BODY MASS INDEX: 41.65 KG/M2 | HEIGHT: 65 IN | RESPIRATION RATE: 16 BRPM | WEIGHT: 250 LBS | HEART RATE: 80 BPM | TEMPERATURE: 97.4 F

## 2023-05-23 DIAGNOSIS — K22.70 BARRETT'S ESOPHAGUS WITHOUT DYSPLASIA: ICD-10-CM

## 2023-05-23 LAB
EKG ATRIAL RATE: 69 BPM
EKG DIAGNOSIS: NORMAL
EKG P AXIS: 29 DEGREES
EKG P-R INTERVAL: 140 MS
EKG Q-T INTERVAL: 408 MS
EKG QRS DURATION: 82 MS
EKG QTC CALCULATION (BAZETT): 437 MS
EKG R AXIS: -36 DEGREES
EKG T AXIS: 42 DEGREES
EKG VENTRICULAR RATE: 69 BPM

## 2023-05-23 PROCEDURE — 88305 TISSUE EXAM BY PATHOLOGIST: CPT

## 2023-05-23 PROCEDURE — 3609017100 HC EGD: Performed by: INTERNAL MEDICINE

## 2023-05-23 PROCEDURE — 93010 ELECTROCARDIOGRAM REPORT: CPT | Performed by: INTERNAL MEDICINE

## 2023-05-23 PROCEDURE — 6360000002 HC RX W HCPCS: Performed by: NURSE ANESTHETIST, CERTIFIED REGISTERED

## 2023-05-23 PROCEDURE — 2709999900 HC NON-CHARGEABLE SUPPLY: Performed by: INTERNAL MEDICINE

## 2023-05-23 PROCEDURE — 7100000011 HC PHASE II RECOVERY - ADDTL 15 MIN: Performed by: INTERNAL MEDICINE

## 2023-05-23 PROCEDURE — 3700000001 HC ADD 15 MINUTES (ANESTHESIA): Performed by: INTERNAL MEDICINE

## 2023-05-23 PROCEDURE — 3609012400 HC EGD TRANSORAL BIOPSY SINGLE/MULTIPLE: Performed by: INTERNAL MEDICINE

## 2023-05-23 PROCEDURE — 6370000000 HC RX 637 (ALT 250 FOR IP): Performed by: INTERNAL MEDICINE

## 2023-05-23 PROCEDURE — 2580000003 HC RX 258: Performed by: FAMILY MEDICINE

## 2023-05-23 PROCEDURE — 6360000002 HC RX W HCPCS: Performed by: ANESTHESIOLOGY

## 2023-05-23 PROCEDURE — 7100000010 HC PHASE II RECOVERY - FIRST 15 MIN: Performed by: INTERNAL MEDICINE

## 2023-05-23 PROCEDURE — 6360000002 HC RX W HCPCS: Performed by: INTERNAL MEDICINE

## 2023-05-23 PROCEDURE — C1888 ENDOVAS NON-CARDIAC ABL CATH: HCPCS | Performed by: INTERNAL MEDICINE

## 2023-05-23 PROCEDURE — 3700000000 HC ANESTHESIA ATTENDED CARE: Performed by: INTERNAL MEDICINE

## 2023-05-23 PROCEDURE — 93005 ELECTROCARDIOGRAM TRACING: CPT | Performed by: INTERNAL MEDICINE

## 2023-05-23 PROCEDURE — C9113 INJ PANTOPRAZOLE SODIUM, VIA: HCPCS | Performed by: INTERNAL MEDICINE

## 2023-05-23 RX ORDER — PROPOFOL 10 MG/ML
INJECTION, EMULSION INTRAVENOUS PRN
Status: DISCONTINUED | OUTPATIENT
Start: 2023-05-23 | End: 2023-05-23 | Stop reason: SDUPTHER

## 2023-05-23 RX ORDER — SUCRALFATE ORAL 1 G/10ML
1 SUSPENSION ORAL
Status: COMPLETED | OUTPATIENT
Start: 2023-05-23 | End: 2023-05-23

## 2023-05-23 RX ORDER — SODIUM CHLORIDE, SODIUM LACTATE, POTASSIUM CHLORIDE, CALCIUM CHLORIDE 600; 310; 30; 20 MG/100ML; MG/100ML; MG/100ML; MG/100ML
INJECTION, SOLUTION INTRAVENOUS CONTINUOUS
Status: DISCONTINUED | OUTPATIENT
Start: 2023-05-23 | End: 2023-05-23 | Stop reason: HOSPADM

## 2023-05-23 RX ORDER — PROPOFOL 10 MG/ML
INJECTION, EMULSION INTRAVENOUS CONTINUOUS PRN
Status: DISCONTINUED | OUTPATIENT
Start: 2023-05-23 | End: 2023-05-23 | Stop reason: SDUPTHER

## 2023-05-23 RX ORDER — PROMETHAZINE HYDROCHLORIDE 25 MG/ML
25 INJECTION, SOLUTION INTRAMUSCULAR; INTRAVENOUS EVERY 6 HOURS PRN
Status: DISCONTINUED | OUTPATIENT
Start: 2023-05-23 | End: 2023-05-23 | Stop reason: HOSPADM

## 2023-05-23 RX ORDER — PANTOPRAZOLE SODIUM 40 MG/10ML
40 INJECTION, POWDER, LYOPHILIZED, FOR SOLUTION INTRAVENOUS DAILY
Status: DISCONTINUED | OUTPATIENT
Start: 2023-05-23 | End: 2023-05-23 | Stop reason: HOSPADM

## 2023-05-23 RX ORDER — LIDOCAINE HYDROCHLORIDE 20 MG/ML
INJECTION, SOLUTION INTRAVENOUS PRN
Status: DISCONTINUED | OUTPATIENT
Start: 2023-05-23 | End: 2023-05-23 | Stop reason: SDUPTHER

## 2023-05-23 RX ADMIN — PROPOFOL 200 MCG/KG/MIN: 10 INJECTION, EMULSION INTRAVENOUS at 10:02

## 2023-05-23 RX ADMIN — PROMETHAZINE HYDROCHLORIDE 25 MG: 25 INJECTION INTRAMUSCULAR; INTRAVENOUS at 09:40

## 2023-05-23 RX ADMIN — SUCRALFATE 1 G: 1 SUSPENSION ORAL at 12:49

## 2023-05-23 RX ADMIN — PANTOPRAZOLE SODIUM 40 MG: 40 INJECTION, POWDER, FOR SOLUTION INTRAVENOUS at 11:47

## 2023-05-23 RX ADMIN — PROPOFOL 150 MG: 10 INJECTION, EMULSION INTRAVENOUS at 10:00

## 2023-05-23 RX ADMIN — SODIUM CHLORIDE, POTASSIUM CHLORIDE, SODIUM LACTATE AND CALCIUM CHLORIDE: 600; 310; 30; 20 INJECTION, SOLUTION INTRAVENOUS at 09:38

## 2023-05-23 RX ADMIN — LIDOCAINE HYDROCHLORIDE 100 MG: 20 INJECTION INTRAVENOUS at 10:00

## 2023-05-23 RX ADMIN — LIDOCAINE HYDROCHLORIDE: 20 SOLUTION ORAL; TOPICAL at 11:09

## 2023-05-23 RX ADMIN — LIDOCAINE HYDROCHLORIDE: 20 SOLUTION ORAL; TOPICAL at 11:53

## 2023-05-23 ASSESSMENT — PAIN SCALES - GENERAL
PAINLEVEL_OUTOF10: 7
PAINLEVEL_OUTOF10: 6
PAINLEVEL_OUTOF10: 7

## 2023-05-23 ASSESSMENT — PAIN DESCRIPTION - PAIN TYPE
TYPE: ACUTE PAIN

## 2023-05-23 ASSESSMENT — PAIN DESCRIPTION - ORIENTATION
ORIENTATION: LEFT;MID
ORIENTATION: INNER;MID
ORIENTATION: INNER;MID

## 2023-05-23 ASSESSMENT — PAIN DESCRIPTION - DESCRIPTORS
DESCRIPTORS: BURNING;PRESSURE;SORE

## 2023-05-23 ASSESSMENT — PAIN SCALES - WONG BAKER
WONGBAKER_NUMERICALRESPONSE: 0

## 2023-05-23 ASSESSMENT — PAIN DESCRIPTION - ONSET
ONSET: ON-GOING

## 2023-05-23 ASSESSMENT — PAIN - FUNCTIONAL ASSESSMENT: PAIN_FUNCTIONAL_ASSESSMENT: 0-10

## 2023-05-23 ASSESSMENT — PAIN DESCRIPTION - LOCATION
LOCATION: CHEST;THROAT
LOCATION: CHEST
LOCATION: CHEST;THROAT
LOCATION: THROAT;CHEST
LOCATION: THROAT;CHEST
LOCATION: CHEST;THROAT

## 2023-05-23 NOTE — ANESTHESIA PRE PROCEDURE
Department of Anesthesiology  Preprocedure Note       Name:  Yessica Olivier   Age:  47 y.o.  :  1970                                          MRN:  [id number]         Date:  2023      Surgeon: Kari Mckinney):  Colin Oliva MD    Procedure: Procedure(s):  ESOPHAGOGASTRODUODENOSCOPY    Medications prior to admission:   Prior to Admission medications    Medication Sig Start Date End Date Taking? Authorizing Provider   meloxicam (MOBIC) 15 MG tablet Take 1 tablet by mouth daily 23   Judge Jaye MD   Famotidine-Ca Carb-Mag Hydrox (PEPCID COMPLETE PO) Take by mouth    Historical Provider, MD   cyclobenzaprine (FLEXERIL) 10 MG tablet Take 1 tablet by mouth 3 times daily as needed for Muscle spasms 22   Judge Jaye MD   vitamin D (ERGOCALCIFEROL) 1.25 MG (95806 UT) CAPS capsule Take 1 capsule by mouth once a week 3/30/21   Judge Jaye MD   pantoprazole (PROTONIX) 40 MG tablet 2 times daily  19   Historical Provider, MD       Current medications:    No current facility-administered medications for this encounter. Allergies:     Allergies   Allergen Reactions    Rocephin [Ceftriaxone] Rash       Problem List:    Patient Active Problem List   Diagnosis Code    Sepsis ([facility] [address] (WBC 19.8K, ) due to pyelonephritis A41.9    Hiatal hernia with GERD and esophagitis/Villela's K44.9, K21.00    Recurrent nephrolithiasis N20.0    COVID-19 U07.1       Past Medical History:        Diagnosis Date    Villela's esophagus     Fibromyalgia     Hx of blood clots     has 3 blood clotting factors  liden factor 5, MTHFR, ProteinS    Kidney stone     PONV (postoperative nausea and vomiting)     AND ITCHING       Past Surgical History:        Procedure Laterality Date    ABDOMEN SURGERY      cholecystectomy, 3 c sections, tubaligations, hysterectomy    APPENDECTOMY      BREAST SURGERY Left     benign 3 times- benign cysts, 2016 benign lumpectomy    CHOLECYSTECTOMY      COLONOSCOPY     

## 2023-05-23 NOTE — DISCHARGE INSTRUCTIONS
ENDOSCOPY DISCHARGE INSTRUCTIONS:    Call the physician that did your procedure for any questions or concerns:           DR. Anaya Middleton:  [phone]               ACTIVITY:    There are potential side effects from the medications used for sedation and anesthesia during your procedure. These include:  Dizziness or light-headedness, confusion or memory loss, delayed reaction times, loss of coordination, nausea and vomiting. Because of your increased risk for injury, we ask that you observe the following precautions: For the next 24 hours,  DO NOT operate an automobile, bicycle, motorcycle, , power tools or large equipment of any kind. Do not drink alcohol, sign any legal documents or make any legal decisions for 24 hours. Do not bend your head over lower than your heart. DO sit on the side of bed/couch awhile before getting up. Plan on bedrest or quiet relaxation today. You may resume normal activities in 24 hours. DIET:    Your first meal today should be light, avoiding spicy and fatty foods. If you tolerate this first meal, then you may advance to your regular diet unless otherwise advised by your physician. NORMAL SYMPTOMS:  -Mild sore throat if youve had an EGD   -Gaseous discomfort if you've had an EGD or Colonoscopy. NOTIFY YOUR PHYSICIAN IF THESE SYMPTOMS OCCUR:  1. Fever (greater than 100)  5. Increased abdominal bloating  2. Severe pain    6. Excessive bleeding  3. Nausea and vomiting  7. Chest pain                                                                    4. Chills    8. Shortness of breath      ADDITIONAL INSTRUCTIONS:    Biopsy results: To be discussed at your follow-up visit. Educational Information:  Stop Meloxicam/Mobic for 7 days, if needed Meloxica/ Mobic can be resumed in 5 days. Take Protonix twice daily.   Full Liquid Diet  Today   Soft Diet for 2 days, then regular diet   Prescription for Carafate     Follow up: Schedule an appointment with Dr. Benny Littlejohn for two

## 2023-05-23 NOTE — H&P
Disease in her maternal grandmother and paternal grandmother; Hypertension in her maternal grandfather; Kidney stones in her father; Other in her maternal grandmother; Ovarian Cancer in her paternal grandmother. Social History:   reports that she has never smoked. She has never used smokeless tobacco. She reports that she does not drink alcohol and does not use drugs. Allergies:  Rocephin [ceftriaxone]    ROS:  twelve point system review was unremarkable except for above noted history. Nurses notes reviewed and agreed. Medications reviewed    Physical Exam:  Vital Signs: LMP 08/30/2015    Skin: normal  HEENT: normal  Neck: supple. No adenopathy. No thyromegaly. No JVD. Pulmonary:Normal  Cardiac:Normal  Abdomen:Normal  MS: normal  Neuro: normal  Ext: no edema. Pulses normal    Pre-Procedure Assessment / Plan:  ASA 2 - Patient with mild systemic disease with no functional limitations  Mallampati Airway Assessment:  Mallampati Class II - (soft palate, fauces & uvula are visible)  Level of Sedation Plan: Moderate sedation  Post Procedure plan: Return to same level of care    I assessed the patient and find that the patient is in satisfactory condition to proceed with the planned procedure and sedation plan. I have explained the risk, benefits, and alternatives to the procedure. The patient understands and agrees to proceed.   Yes    Dragan Najera MD  9:04 AM 5/23/2023

## 2023-05-23 NOTE — PROGRESS NOTES
Ambulatory Surgery/Procedure Discharge Note    Vitals:    05/23/23 1250   BP: 113/66   Pulse: 80   Resp: 16   Temp:    SpO2: 97%       In: 150 [I.V.:150]  Out: -     Restroom use offered before discharge. Yes, pt ambulated to bathroom, assist x1. Pain assessment:  level of pain (1-10, 10 severe)  Pain Level: 6, pt states pain tolerable for discharge   Pt to Endoscopy recovery post EGD with RFA. Upon waking pt c/o throat and chest pain 7/10, pt given ice chips and PO fluids. Pt states the PO fluids \"burn\" going down. Dr. Jorja Spatz notified with new orders, administer GI cocktail. Post GI cocktail administration pt states pain is \" not better\". Dr. Jorja Spatz notified with new orders, administer Protonix 40 mg IVP, and and additional GI cocktail and Carafate 10 mg. Medications given as ordered, pt states that pain is \" a little better\", pt able to rest between medication administrations. Pt c/o chest pressure, Dr. Jorja Spatz notified with new orders, get STAT EKG. EKG obtained and Dr. Jorja Spatz reviewed EKG, per Dr. Jorja Spatz ok to discharge pt. Pt states that after Carafate administration, chest pressure is \"better. \"   Discharge instructions and written prescription given to pt's mother and she states understanding of these instructions. Pt and pt's mother state that pt is \"ready to go. \"       Patient discharged to home/self care.  Patient discharged via wheel chair by transporter to waiting family/S.O.       5/23/2023 1:24 PM

## 2023-05-24 NOTE — OP NOTE
[place] [address]                                OPERATIVE REPORT    PATIENT NAME: Yong Romano                      :        1970  MED REC NO:   [id number]                          ROOM:  ACCOUNT NO:   [de-identified]                           ADMIT DATE: 2023  PROVIDER:     Chris Sandy MD    DATE OF PROCEDURE:  2023    SURGEON:  Chris Sandy MD    INDICATION FOR PROCEDURE:  Villela's esophagus, followup radiofrequency  ablation. DESCRIPTION OF PROCEDURE:  With the patient in the left lateral  position, and after IV Diprivan, the Olympus video endoscope was  introduced into the esophagus and advanced towards the GE junction with  hiatus hernia and residual Villela's esophagus was seen. Radiofrequency  ablation was performed using a TTS electrode. Stomach showed minor  antral gastritis and biopsies were obtained for Helicobacter pylori. The duodenum was normal.  No other abnormality. Scope was then removed  without complication. IMPRESSION:  1. Hiatus hernia. 2.  Villela's esophagus. Radiofrequency ablation was performed. 3.  Minor antral gastritis. ESTIMATED BLOOD LOSS:  None. Marietta Denver, MD    D: 2023 10:28:37       T: 2023 11:45:36     SANTOSH/KAROLINA_KEVON_MATILDE  Job#: [id number]     Doc#: [id number]    CC:   Chris Sandy MD

## 2023-08-12 ENCOUNTER — APPOINTMENT (OUTPATIENT)
Dept: CT IMAGING | Age: 53
End: 2023-08-12
Payer: COMMERCIAL

## 2023-08-12 ENCOUNTER — HOSPITAL ENCOUNTER (EMERGENCY)
Age: 53
Discharge: HOME | End: 2023-08-12
Attending: STUDENT IN AN ORGANIZED HEALTH CARE EDUCATION/TRAINING PROGRAM
Payer: COMMERCIAL

## 2023-08-12 VITALS
OXYGEN SATURATION: 96 % | DIASTOLIC BLOOD PRESSURE: 72 MMHG | BODY MASS INDEX: 43.4 KG/M2 | SYSTOLIC BLOOD PRESSURE: 154 MMHG | HEART RATE: 75 BPM | TEMPERATURE: 98 F | WEIGHT: 260.8 LBS | RESPIRATION RATE: 16 BRPM

## 2023-08-12 DIAGNOSIS — R10.9 FLANK PAIN: Primary | ICD-10-CM

## 2023-08-12 LAB
ANION GAP SERPL CALCULATED.3IONS-SCNC: 12 MMOL/L (ref 3–16)
BACTERIA URNS QL MICRO: ABNORMAL /HPF
BASOPHILS # BLD: 0.1 K/UL (ref 0–0.2)
BASOPHILS NFR BLD: 0.8 %
BILIRUB UR QL STRIP.AUTO: NEGATIVE
BUN SERPL-MCNC: 13 MG/DL (ref 7–20)
CALCIUM SERPL-MCNC: 9.1 MG/DL (ref 8.3–10.6)
CHLORIDE SERPL-SCNC: 106 MMOL/L (ref 99–110)
CLARITY UR: CLEAR
CO2 SERPL-SCNC: 23 MMOL/L (ref 21–32)
COLOR UR: YELLOW
CREAT SERPL-MCNC: 0.9 MG/DL (ref 0.6–1.1)
DEPRECATED RDW RBC AUTO: 14.8 % (ref 12.4–15.4)
EOSINOPHIL # BLD: 0.3 K/UL (ref 0–0.6)
EOSINOPHIL NFR BLD: 3 %
EPI CELLS #/AREA URNS HPF: ABNORMAL /HPF (ref 0–5)
GFR SERPLBLD CREATININE-BSD FMLA CKD-EPI: >60 ML/MIN/{1.73_M2}
GLUCOSE SERPL-MCNC: 100 MG/DL (ref 70–99)
GLUCOSE UR STRIP.AUTO-MCNC: NEGATIVE MG/DL
HCG SERPL QL: NEGATIVE
HCT VFR BLD AUTO: 40.5 % (ref 36–48)
HGB BLD-MCNC: 13.8 G/DL (ref 12–16)
HGB UR QL STRIP.AUTO: NEGATIVE
KETONES UR STRIP.AUTO-MCNC: NEGATIVE MG/DL
LEUKOCYTE ESTERASE UR QL STRIP.AUTO: NEGATIVE
LYMPHOCYTES # BLD: 2.7 K/UL (ref 1–5.1)
LYMPHOCYTES NFR BLD: 28 %
MCH RBC QN AUTO: 29.2 PG (ref 26–34)
MCHC RBC AUTO-ENTMCNC: 34.1 G/DL (ref 31–36)
MCV RBC AUTO: 85.5 FL (ref 80–100)
MONOCYTES # BLD: 0.7 K/UL (ref 0–1.3)
MONOCYTES NFR BLD: 7.5 %
NEUTROPHILS # BLD: 5.9 K/UL (ref 1.7–7.7)
NEUTROPHILS NFR BLD: 60.7 %
NITRITE UR QL STRIP.AUTO: NEGATIVE
PH UR STRIP.AUTO: 6 [PH] (ref 5–8)
PLATELET # BLD AUTO: 306 K/UL (ref 135–450)
PMV BLD AUTO: 7.6 FL (ref 5–10.5)
POTASSIUM SERPL-SCNC: 4 MMOL/L (ref 3.5–5.1)
PROT UR STRIP.AUTO-MCNC: NEGATIVE MG/DL
RBC # BLD AUTO: 4.73 M/UL (ref 4–5.2)
RBC #/AREA URNS HPF: ABNORMAL /HPF (ref 0–4)
SODIUM SERPL-SCNC: 141 MMOL/L (ref 136–145)
SP GR UR STRIP.AUTO: 1.02 (ref 1–1.03)
UA DIPSTICK W REFLEX MICRO PNL UR: ABNORMAL
URN SPEC COLLECT METH UR: ABNORMAL
UROBILINOGEN UR STRIP-ACNC: 0.2 E.U./DL
WBC # BLD AUTO: 9.8 K/UL (ref 4–11)
WBC #/AREA URNS HPF: ABNORMAL /HPF (ref 0–5)

## 2023-08-12 PROCEDURE — 85025 COMPLETE CBC W/AUTO DIFF WBC: CPT

## 2023-08-12 PROCEDURE — 6360000004 HC RX CONTRAST MEDICATION: Performed by: STUDENT IN AN ORGANIZED HEALTH CARE EDUCATION/TRAINING PROGRAM

## 2023-08-12 PROCEDURE — 81001 URINALYSIS AUTO W/SCOPE: CPT

## 2023-08-12 PROCEDURE — 6360000002 HC RX W HCPCS

## 2023-08-12 PROCEDURE — 96361 HYDRATE IV INFUSION ADD-ON: CPT

## 2023-08-12 PROCEDURE — 74177 CT ABD & PELVIS W/CONTRAST: CPT

## 2023-08-12 PROCEDURE — 99285 EMERGENCY DEPT VISIT HI MDM: CPT

## 2023-08-12 PROCEDURE — 96374 THER/PROPH/DIAG INJ IV PUSH: CPT

## 2023-08-12 PROCEDURE — 84703 CHORIONIC GONADOTROPIN ASSAY: CPT

## 2023-08-12 PROCEDURE — 96375 TX/PRO/DX INJ NEW DRUG ADDON: CPT

## 2023-08-12 PROCEDURE — 2580000003 HC RX 258

## 2023-08-12 PROCEDURE — 96372 THER/PROPH/DIAG INJ SC/IM: CPT

## 2023-08-12 PROCEDURE — 80048 BASIC METABOLIC PNL TOTAL CA: CPT

## 2023-08-12 RX ORDER — DROPERIDOL 2.5 MG/ML
1.25 INJECTION, SOLUTION INTRAMUSCULAR; INTRAVENOUS ONCE
Status: COMPLETED | OUTPATIENT
Start: 2023-08-12 | End: 2023-08-12

## 2023-08-12 RX ORDER — PROMETHAZINE HYDROCHLORIDE 25 MG/ML
12.5 INJECTION, SOLUTION INTRAMUSCULAR; INTRAVENOUS ONCE
Status: COMPLETED | OUTPATIENT
Start: 2023-08-12 | End: 2023-08-12

## 2023-08-12 RX ORDER — KETOROLAC TROMETHAMINE 30 MG/ML
15 INJECTION, SOLUTION INTRAMUSCULAR; INTRAVENOUS ONCE
Status: COMPLETED | OUTPATIENT
Start: 2023-08-12 | End: 2023-08-12

## 2023-08-12 RX ORDER — SODIUM CHLORIDE, SODIUM LACTATE, POTASSIUM CHLORIDE, AND CALCIUM CHLORIDE .6; .31; .03; .02 G/100ML; G/100ML; G/100ML; G/100ML
1000 INJECTION, SOLUTION INTRAVENOUS ONCE
Status: COMPLETED | OUTPATIENT
Start: 2023-08-12 | End: 2023-08-12

## 2023-08-12 RX ADMIN — IOPAMIDOL 75 ML: 755 INJECTION, SOLUTION INTRAVENOUS at 20:23

## 2023-08-12 RX ADMIN — HYDROMORPHONE HYDROCHLORIDE 1 MG: 1 INJECTION, SOLUTION INTRAMUSCULAR; INTRAVENOUS; SUBCUTANEOUS at 20:38

## 2023-08-12 RX ADMIN — DROPERIDOL 1.25 MG: 2.5 INJECTION, SOLUTION INTRAMUSCULAR; INTRAVENOUS at 21:08

## 2023-08-12 RX ADMIN — PROMETHAZINE HYDROCHLORIDE 12.5 MG: 25 INJECTION INTRAMUSCULAR; INTRAVENOUS at 19:42

## 2023-08-12 RX ADMIN — SODIUM CHLORIDE, POTASSIUM CHLORIDE, SODIUM LACTATE AND CALCIUM CHLORIDE 1000 ML: 600; 310; 30; 20 INJECTION, SOLUTION INTRAVENOUS at 19:33

## 2023-08-12 RX ADMIN — KETOROLAC TROMETHAMINE 15 MG: 30 INJECTION, SOLUTION INTRAMUSCULAR at 19:40

## 2023-08-12 ASSESSMENT — PAIN SCALES - GENERAL
PAINLEVEL_OUTOF10: 9
PAINLEVEL_OUTOF10: 10

## 2023-08-12 ASSESSMENT — PAIN DESCRIPTION - LOCATION
LOCATION: FLANK
LOCATION: ABDOMEN;FLANK

## 2023-08-12 ASSESSMENT — PAIN DESCRIPTION - DESCRIPTORS
DESCRIPTORS: SHARP;SHOOTING
DESCRIPTORS: SHOOTING;SHARP
DESCRIPTORS: SHOOTING;SHARP

## 2023-08-12 ASSESSMENT — PAIN DESCRIPTION - ORIENTATION
ORIENTATION: RIGHT

## 2023-08-12 ASSESSMENT — PAIN - FUNCTIONAL ASSESSMENT
PAIN_FUNCTIONAL_ASSESSMENT: 0-10
PAIN_FUNCTIONAL_ASSESSMENT: 0-10

## 2023-08-12 NOTE — ED PROVIDER NOTES
ED Attending Attestation Note     Date of evaluation: 8/12/2023    I have discussed the case with the resident. I have personally performed a history, physical exam, and my own medical decision making. I have reviewed the note and agree with the findings and plan. My assessment reveals a 81-year-old female with history of kidney stones and Ivllela's esophagus sitting up comfortably in bed. Her work-up was unremarkable with reassuring lab work and a negative CT scan. She received multiple doses of pain medication including Toradol, Phenergan, Dilaudid, and droperidol as well as fluid resuscitation. No clear acute pathology in the abdomen to explain symptoms. Patient will have symptoms controlled and discharged home with close PCP follow-up.        Johnathon Gray MD  08/12/23 7860

## 2023-08-13 NOTE — ED NOTES
Pt educated on discharge instructions and given discharge papers. Pt verbalized understanding discharge with no questions nor concerns. Pt ambulated to exit with stable gait.      Aaron Adan RN  08/12/23 7870

## 2023-08-13 NOTE — DISCHARGE INSTRUCTIONS
You were seen in the Emergency Department for your flank pain. We are glad that you are starting to feel better. You should return to the emergency department if your symptoms worsen or do not resolve. In addition, return if:  - You have a fever (greater than 101 degrees)  - You have chest pain, shortness of breath, abdominal pain, or uncontrollable vomiting  - You are unable to eat or drink  - You pass out  - You have difficulty moving your arms or legs   - You have difficulty speaking or slurred speech  - Or you have any concern that you feel needs acute physician evaluation.     Thank you for letting us taking part in your care and we wish you all the best!

## 2023-08-14 ENCOUNTER — TELEPHONE (OUTPATIENT)
Dept: INTERNAL MEDICINE CLINIC | Age: 53
End: 2023-08-14

## 2023-08-14 NOTE — TELEPHONE ENCOUNTER
Urine results from ER   (Newest Message First)  Emeterio Evans [facility] [address] [address] (supporting Ángel Tomas MD) 5 hours ago (8:50 AM)     TW  Hello  I went to ER Saturday for passing kidney stone , the ct scan did not see any stone passing. However I am still hurting and feeling blah, so yesterday morning I checked my test results and showed I have bacteria ua rare in my test.  Does this mean I have an infection? If so could I have medicine for the infection ?   Thanks   [employer]

## 2023-08-17 ENCOUNTER — TELEPHONE (OUTPATIENT)
Dept: INTERNAL MEDICINE CLINIC | Age: 53
End: 2023-08-17

## 2023-08-17 NOTE — TELEPHONE ENCOUNTER
----- Message from [facility]. Bridget Sam sent at 8/16/2023 11:55 AM EDT -----  Regarding: Urine results from ER   Contact: [phone]  Was wondering if Dr Jake Kruse could explain to me what bacteria ua rare in my test results mean? Does is mean I have an infection?   Thanks   [employer]

## 2023-08-18 ENCOUNTER — ANESTHESIA (OUTPATIENT)
Dept: ENDOSCOPY | Age: 53
End: 2023-08-18
Payer: COMMERCIAL

## 2023-08-18 ENCOUNTER — ANESTHESIA EVENT (OUTPATIENT)
Dept: ENDOSCOPY | Age: 53
End: 2023-08-18
Payer: COMMERCIAL

## 2023-08-18 ENCOUNTER — HOSPITAL ENCOUNTER (OUTPATIENT)
Age: 53
Setting detail: OUTPATIENT SURGERY
Discharge: HOME | End: 2023-08-18
Attending: INTERNAL MEDICINE | Admitting: INTERNAL MEDICINE
Payer: COMMERCIAL

## 2023-08-18 VITALS
HEIGHT: 65 IN | BODY MASS INDEX: 42.49 KG/M2 | HEART RATE: 69 BPM | DIASTOLIC BLOOD PRESSURE: 70 MMHG | TEMPERATURE: 97.3 F | OXYGEN SATURATION: 96 % | RESPIRATION RATE: 18 BRPM | WEIGHT: 255 LBS | SYSTOLIC BLOOD PRESSURE: 139 MMHG

## 2023-08-18 DIAGNOSIS — K22.70 BARRETT'S ESOPHAGUS WITHOUT DYSPLASIA: ICD-10-CM

## 2023-08-18 PROCEDURE — 6370000000 HC RX 637 (ALT 250 FOR IP): Performed by: INTERNAL MEDICINE

## 2023-08-18 PROCEDURE — 88305 TISSUE EXAM BY PATHOLOGIST: CPT

## 2023-08-18 PROCEDURE — 3700000001 HC ADD 15 MINUTES (ANESTHESIA): Performed by: INTERNAL MEDICINE

## 2023-08-18 PROCEDURE — C1888 ENDOVAS NON-CARDIAC ABL CATH: HCPCS | Performed by: INTERNAL MEDICINE

## 2023-08-18 PROCEDURE — 2500000003 HC RX 250 WO HCPCS: Performed by: NURSE ANESTHETIST, CERTIFIED REGISTERED

## 2023-08-18 PROCEDURE — 2580000003 HC RX 258: Performed by: FAMILY MEDICINE

## 2023-08-18 PROCEDURE — 3700000000 HC ANESTHESIA ATTENDED CARE: Performed by: INTERNAL MEDICINE

## 2023-08-18 PROCEDURE — 2709999900 HC NON-CHARGEABLE SUPPLY: Performed by: INTERNAL MEDICINE

## 2023-08-18 PROCEDURE — 6360000002 HC RX W HCPCS: Performed by: INTERNAL MEDICINE

## 2023-08-18 PROCEDURE — 6360000002 HC RX W HCPCS: Performed by: NURSE ANESTHETIST, CERTIFIED REGISTERED

## 2023-08-18 PROCEDURE — 3609013200 HC EGD W/ ABLATION: Performed by: INTERNAL MEDICINE

## 2023-08-18 PROCEDURE — 6360000002 HC RX W HCPCS: Performed by: ANESTHESIOLOGY

## 2023-08-18 PROCEDURE — 7100000011 HC PHASE II RECOVERY - ADDTL 15 MIN: Performed by: INTERNAL MEDICINE

## 2023-08-18 PROCEDURE — 7100000010 HC PHASE II RECOVERY - FIRST 15 MIN: Performed by: INTERNAL MEDICINE

## 2023-08-18 RX ORDER — LIDOCAINE HYDROCHLORIDE 20 MG/ML
INJECTION, SOLUTION EPIDURAL; INFILTRATION; INTRACAUDAL; PERINEURAL PRN
Status: DISCONTINUED | OUTPATIENT
Start: 2023-08-18 | End: 2023-08-18 | Stop reason: SDUPTHER

## 2023-08-18 RX ORDER — PROMETHAZINE HYDROCHLORIDE 25 MG/ML
6.25 INJECTION, SOLUTION INTRAMUSCULAR; INTRAVENOUS ONCE
Status: DISCONTINUED | OUTPATIENT
Start: 2023-08-18 | End: 2023-08-18

## 2023-08-18 RX ORDER — PROPOFOL 10 MG/ML
INJECTION, EMULSION INTRAVENOUS PRN
Status: DISCONTINUED | OUTPATIENT
Start: 2023-08-18 | End: 2023-08-18 | Stop reason: SDUPTHER

## 2023-08-18 RX ORDER — ACETYLCYSTEINE 200 MG/ML
SOLUTION ORAL; RESPIRATORY (INHALATION) PRN
Status: DISCONTINUED | OUTPATIENT
Start: 2023-08-18 | End: 2023-08-18 | Stop reason: ALTCHOICE

## 2023-08-18 RX ORDER — PROMETHAZINE HYDROCHLORIDE 25 MG/ML
25 INJECTION, SOLUTION INTRAMUSCULAR; INTRAVENOUS ONCE
Status: COMPLETED | OUTPATIENT
Start: 2023-08-18 | End: 2023-08-18

## 2023-08-18 RX ORDER — SODIUM CHLORIDE, SODIUM LACTATE, POTASSIUM CHLORIDE, CALCIUM CHLORIDE 600; 310; 30; 20 MG/100ML; MG/100ML; MG/100ML; MG/100ML
INJECTION, SOLUTION INTRAVENOUS CONTINUOUS
Status: DISCONTINUED | OUTPATIENT
Start: 2023-08-18 | End: 2023-08-18 | Stop reason: HOSPADM

## 2023-08-18 RX ADMIN — LIDOCAINE HYDROCHLORIDE 80 MG: 20 INJECTION, SOLUTION EPIDURAL; INFILTRATION; INTRACAUDAL; PERINEURAL at 10:59

## 2023-08-18 RX ADMIN — SODIUM CHLORIDE, POTASSIUM CHLORIDE, SODIUM LACTATE AND CALCIUM CHLORIDE: 600; 310; 30; 20 INJECTION, SOLUTION INTRAVENOUS at 09:58

## 2023-08-18 RX ADMIN — PROPOFOL 150 MG: 10 INJECTION, EMULSION INTRAVENOUS at 11:10

## 2023-08-18 RX ADMIN — PROMETHAZINE HYDROCHLORIDE 25 MG: 25 INJECTION INTRAMUSCULAR; INTRAVENOUS at 10:12

## 2023-08-18 RX ADMIN — PROPOFOL 50 MG: 10 INJECTION, EMULSION INTRAVENOUS at 11:15

## 2023-08-18 RX ADMIN — LIDOCAINE HYDROCHLORIDE: 20 SOLUTION ORAL; TOPICAL at 12:40

## 2023-08-18 RX ADMIN — PROPOFOL 80 MG: 10 INJECTION, EMULSION INTRAVENOUS at 11:13

## 2023-08-18 ASSESSMENT — PAIN DESCRIPTION - ORIENTATION: ORIENTATION: RIGHT;MID

## 2023-08-18 ASSESSMENT — PAIN DESCRIPTION - FREQUENCY: FREQUENCY: CONTINUOUS

## 2023-08-18 ASSESSMENT — PAIN DESCRIPTION - PAIN TYPE: TYPE: ACUTE PAIN

## 2023-08-18 ASSESSMENT — PAIN SCALES - GENERAL
PAINLEVEL_OUTOF10: 6
PAINLEVEL_OUTOF10: 0
PAINLEVEL_OUTOF10: 0

## 2023-08-18 ASSESSMENT — PAIN DESCRIPTION - LOCATION: LOCATION: BACK;ABDOMEN

## 2023-08-18 ASSESSMENT — PAIN DESCRIPTION - ONSET: ONSET: ON-GOING

## 2023-08-18 ASSESSMENT — PAIN SCALES - WONG BAKER: WONGBAKER_NUMERICALRESPONSE: 0

## 2023-08-18 ASSESSMENT — PAIN DESCRIPTION - DESCRIPTORS: DESCRIPTORS: ACHING;SHARP;STABBING

## 2023-08-18 NOTE — H&P
History and Physical / Pre-Sedation Assessment    Patient:  Al Mendez   :   1970     Intended Procedure:  egd      HPI: enriquez esophagus. Fu rfa      Past Medical History:   has a past medical history of Enriquez's esophagus, Fibromyalgia, Hx of blood clots, Kidney stone, and PONV (postoperative nausea and vomiting). Past Surgical History:   has a past surgical history that includes other surgical history (10/03/2014); Appendectomy; Abdomen surgery; Cholecystectomy; Colonoscopy; Endoscopy, colon, diagnostic; Hysterectomy; Tonsillectomy; Upper gastrointestinal endoscopy (N/A, 2018); Upper gastrointestinal endoscopy (N/A, 2019); Upper gastrointestinal endoscopy (N/A, 2019); Breast surgery (Left); Colonoscopy (2019); Colonoscopy (2019); Upper gastrointestinal endoscopy (N/A, 2019); Upper gastrointestinal endoscopy (N/A, 2019); Upper gastrointestinal endoscopy (N/A, 2021); Colonoscopy (N/A, 2021); Upper gastrointestinal endoscopy (N/A, 2023); Upper gastrointestinal endoscopy (N/A, 2023); Upper gastrointestinal endoscopy (N/A, 2023);  section; Upper gastrointestinal endoscopy (N/A, 2023); and Upper gastrointestinal endoscopy (N/A, 2023). Medications:  Prior to Admission medications    Medication Sig Start Date End Date Taking?  Authorizing Provider   Famotidine-Ca Carb-Mag Hydrox (PEPCID COMPLETE PO) Take by mouth    Historical Provider, MD   cyclobenzaprine (FLEXERIL) 10 MG tablet Take 1 tablet by mouth 3 times daily as needed for Muscle spasms 22   Monae Frost MD   vitamin D (ERGOCALCIFEROL) 1.25 MG (42680 UT) CAPS capsule Take 1 capsule by mouth once a week 3/30/21   Monae Frost MD   pantoprazole (PROTONIX) 40 MG tablet 2 times daily  19   Historical Provider, MD       Family History:  family history includes Breast Cancer in an other family member; Jose Skillern in her maternal grandfather; Diabetes type 2  in her maternal grandfather; Heart Disease in her maternal grandmother and paternal grandmother; Hypertension in her maternal grandfather; Kidney stones in her father; Other in her maternal grandmother; Ovarian Cancer in her paternal grandmother. Social History:   reports that she has never smoked. She has never used smokeless tobacco. She reports that she does not drink alcohol and does not use drugs. Allergies:  Rocephin [ceftriaxone]    ROS:  twelve point system review was unremarkable except for above noted history. Nurses notes reviewed and agreed. Medications reviewed    Physical Exam:  Vital Signs: LMP 08/30/2015    Skin: normal  HEENT: normal  Neck: supple. No adenopathy. No thyromegaly. No JVD. Pulmonary:Normal  Cardiac:Normal  Abdomen:Normal  MS: normal  Neuro: normal  Ext: no edema. Pulses normal    Pre-Procedure Assessment / Plan:  ASA 2 - Patient with mild systemic disease with no functional limitations  Mallampati Airway Assessment:  Mallampati Class II - (soft palate, fauces & uvula are visible)  Level of Sedation Plan: Moderate sedation  Post Procedure plan: Return to same level of care    I assessed the patient and find that the patient is in satisfactory condition to proceed with the planned procedure and sedation plan. I have explained the risk, benefits, and alternatives to the procedure. The patient understands and agrees to proceed.   Yes    Consuelo Ford MD  9:23 AM 8/18/2023

## 2023-08-18 NOTE — PROGRESS NOTES
Ambulatory Surgery/Procedure Discharge Note    Vitals:    08/18/23 1150   BP: 139/70   Pulse: 69   Resp: 18   Temp:    SpO2: 96%       No intake/output data recorded. Restroom use offered before discharge. Yes    Pain assessment:  none  Pain Level: 0    Pt a&o x4. VSS. Pt denies pain and nausea. GI cocktail given. Reviewed d/c instructions and removed IV. Patient discharged to home/self care.  Patient discharged via wheel chair by transporter to waiting family/S.O.       8/18/2023 12:29 PM

## 2023-08-18 NOTE — ANESTHESIA POSTPROCEDURE EVALUATION
Department of Anesthesiology  Postprocedure Note    Patient: Adam Lemus  MRN: [id number]  YOB: 1970  Date of evaluation: 8/18/2023      Procedure Summary     Date: 08/18/23 Room / Location: [place] [address] / [facility] [address] [facility]    Anesthesia Start: 2726 Anesthesia Stop: 9696    Procedure: ESOPHAGOGASTRODUODENOSCOPY AND RADIOFREQUENCY ABLATION/gastric bx Diagnosis:       Villela's esophagus without dysplasia      (Villela's esophagus without dysplasia [K22.70])    Surgeons: Ирина Abraham MD Responsible Provider: Levon Iglesias MD    Anesthesia Type: MAC ASA Status: 3          Anesthesia Type: No value filed.     Salomon Phase I: Salomon Score: 10    Salomon Phase II: Salomon Score: 9      Anesthesia Post Evaluation    Patient location during evaluation: PACU  Patient participation: complete - patient participated  Level of consciousness: awake  Pain score: 0  Airway patency: patent  Nausea & Vomiting: no nausea  Complications: no  Cardiovascular status: hemodynamically stable  Respiratory status: acceptable  Hydration status: stable  Pain management: satisfactory to patient

## 2023-08-18 NOTE — PROGRESS NOTES
Ambulatory Surgery/Procedure Discharge Note    Vitals:    08/18/23 1130   BP: 131/74   Pulse: 67   Resp: 20   Temp:    SpO2: 93%       No intake/output data recorded. Pain assessment:  Relaxed facial expressing      Lying on left side , non reactive to name till 1132. Soft obese abdomen.      Report to [facility] at [phone]    8/18/2023 11:34 AM

## 2023-08-19 NOTE — OP NOTE
[facility], [address] [place] [address]                                OPERATIVE REPORT    PATIENT NAME: Dasia Dill                      :        1970  MED REC NO:   [id number]                          ROOM:  ACCOUNT NO:   [de-identified]                           ADMIT DATE: 2023  PROVIDER:     Manohar Gr MD    DATE OF PROCEDURE:  2023    INDICATION FOR PROCEDURE:  Villela's esophagus - followup RFA. SURGEON:  Manohar Gr MD    DESCRIPTION OF THE PROCEDURE:  With the patient in the left lateral  position and after IV Diprivan, the Olympus video endoscope was  introduced into the esophagus and advanced towards the gastroesophageal  junction. Most of the Villela's esophagus was totally ablated from  prior ablation. Residual was seen and today was radiofrequency ablation  was performed using TTS electrode. A total of 18 applications were  performed. Hiatus hernia was identified. Stomach was carefully  inspected and it was normal.  Biopsies were obtained for Helicobacter  pylori. The duodenum was normal.  The scope was then removed without  complication. IMPRESSION:  1. Hiatus hernia. 2.  Residual Villela's esophagus. Radiofrequency ablation was  performed. ESTIMATED BLOOD LOSS:  None. Manohar Gr MD    D: 2023 11:37:47       T: 2023 12:37:53     SANTOSH/KAROLINA_ANDREW_MATILDE  Job#: [id number]     Doc#: [id number]    CC:   Manohar Gr MD

## 2023-11-01 ENCOUNTER — ANESTHESIA EVENT (OUTPATIENT)
Dept: ENDOSCOPY | Age: 53
End: 2023-11-01
Payer: COMMERCIAL

## 2023-11-01 ENCOUNTER — HOSPITAL ENCOUNTER (OUTPATIENT)
Age: 53
Setting detail: OUTPATIENT SURGERY
Discharge: HOME | End: 2023-11-01
Attending: INTERNAL MEDICINE | Admitting: INTERNAL MEDICINE
Payer: COMMERCIAL

## 2023-11-01 ENCOUNTER — ANESTHESIA (OUTPATIENT)
Dept: ENDOSCOPY | Age: 53
End: 2023-11-01
Payer: COMMERCIAL

## 2023-11-01 VITALS
DIASTOLIC BLOOD PRESSURE: 69 MMHG | HEART RATE: 69 BPM | RESPIRATION RATE: 16 BRPM | WEIGHT: 255 LBS | SYSTOLIC BLOOD PRESSURE: 132 MMHG | BODY MASS INDEX: 42.49 KG/M2 | OXYGEN SATURATION: 100 % | HEIGHT: 65 IN | TEMPERATURE: 97.5 F

## 2023-11-01 DIAGNOSIS — K22.70 BARRETT'S ESOPHAGUS WITHOUT DYSPLASIA: ICD-10-CM

## 2023-11-01 PROCEDURE — 2500000003 HC RX 250 WO HCPCS: Performed by: NURSE ANESTHETIST, CERTIFIED REGISTERED

## 2023-11-01 PROCEDURE — 7100000011 HC PHASE II RECOVERY - ADDTL 15 MIN: Performed by: INTERNAL MEDICINE

## 2023-11-01 PROCEDURE — C1888 ENDOVAS NON-CARDIAC ABL CATH: HCPCS | Performed by: INTERNAL MEDICINE

## 2023-11-01 PROCEDURE — 2709999900 HC NON-CHARGEABLE SUPPLY: Performed by: INTERNAL MEDICINE

## 2023-11-01 PROCEDURE — 3609013200 HC EGD W/ ABLATION: Performed by: INTERNAL MEDICINE

## 2023-11-01 PROCEDURE — 7100000010 HC PHASE II RECOVERY - FIRST 15 MIN: Performed by: INTERNAL MEDICINE

## 2023-11-01 PROCEDURE — 6370000000 HC RX 637 (ALT 250 FOR IP): Performed by: INTERNAL MEDICINE

## 2023-11-01 PROCEDURE — 6360000002 HC RX W HCPCS: Performed by: INTERNAL MEDICINE

## 2023-11-01 PROCEDURE — 3700000001 HC ADD 15 MINUTES (ANESTHESIA): Performed by: INTERNAL MEDICINE

## 2023-11-01 PROCEDURE — 6360000002 HC RX W HCPCS: Performed by: ANESTHESIOLOGY

## 2023-11-01 PROCEDURE — 2580000003 HC RX 258: Performed by: ANESTHESIOLOGY

## 2023-11-01 PROCEDURE — 6360000002 HC RX W HCPCS: Performed by: NURSE ANESTHETIST, CERTIFIED REGISTERED

## 2023-11-01 PROCEDURE — 88305 TISSUE EXAM BY PATHOLOGIST: CPT

## 2023-11-01 PROCEDURE — 3700000000 HC ANESTHESIA ATTENDED CARE: Performed by: INTERNAL MEDICINE

## 2023-11-01 RX ORDER — ACETYLCYSTEINE 200 MG/ML
SOLUTION ORAL; RESPIRATORY (INHALATION) PRN
Status: DISCONTINUED | OUTPATIENT
Start: 2023-11-01 | End: 2023-11-01 | Stop reason: ALTCHOICE

## 2023-11-01 RX ORDER — PROPOFOL 10 MG/ML
INJECTION, EMULSION INTRAVENOUS PRN
Status: DISCONTINUED | OUTPATIENT
Start: 2023-11-01 | End: 2023-11-01 | Stop reason: SDUPTHER

## 2023-11-01 RX ORDER — SODIUM CHLORIDE, SODIUM LACTATE, POTASSIUM CHLORIDE, CALCIUM CHLORIDE 600; 310; 30; 20 MG/100ML; MG/100ML; MG/100ML; MG/100ML
INJECTION, SOLUTION INTRAVENOUS CONTINUOUS
Status: DISCONTINUED | OUTPATIENT
Start: 2023-11-01 | End: 2023-11-01 | Stop reason: HOSPADM

## 2023-11-01 RX ORDER — LIDOCAINE HYDROCHLORIDE 20 MG/ML
INJECTION, SOLUTION EPIDURAL; INFILTRATION; INTRACAUDAL; PERINEURAL PRN
Status: DISCONTINUED | OUTPATIENT
Start: 2023-11-01 | End: 2023-11-01 | Stop reason: SDUPTHER

## 2023-11-01 RX ORDER — PROPOFOL 10 MG/ML
INJECTION, EMULSION INTRAVENOUS CONTINUOUS PRN
Status: DISCONTINUED | OUTPATIENT
Start: 2023-11-01 | End: 2023-11-01 | Stop reason: SDUPTHER

## 2023-11-01 RX ORDER — PROMETHAZINE HYDROCHLORIDE 25 MG/ML
12.5 INJECTION, SOLUTION INTRAMUSCULAR; INTRAVENOUS ONCE
Status: COMPLETED | OUTPATIENT
Start: 2023-11-01 | End: 2023-11-01

## 2023-11-01 RX ADMIN — PROMETHAZINE HYDROCHLORIDE 12.5 MG: 25 INJECTION INTRAMUSCULAR; INTRAVENOUS at 09:05

## 2023-11-01 RX ADMIN — PROPOFOL 200 MCG/KG/MIN: 10 INJECTION, EMULSION INTRAVENOUS at 09:46

## 2023-11-01 RX ADMIN — SODIUM CHLORIDE, POTASSIUM CHLORIDE, SODIUM LACTATE AND CALCIUM CHLORIDE: 600; 310; 30; 20 INJECTION, SOLUTION INTRAVENOUS at 09:02

## 2023-11-01 RX ADMIN — PROPOFOL 100 MG: 10 INJECTION, EMULSION INTRAVENOUS at 09:45

## 2023-11-01 RX ADMIN — PROPOFOL 50 MG: 10 INJECTION, EMULSION INTRAVENOUS at 09:46

## 2023-11-01 RX ADMIN — ALUMINUM HYDROXIDE, MAGNESIUM HYDROXIDE, AND SIMETHICONE: 200; 200; 20 SUSPENSION ORAL at 10:37

## 2023-11-01 RX ADMIN — LIDOCAINE HYDROCHLORIDE 100 MG: 20 INJECTION, SOLUTION EPIDURAL; INFILTRATION; INTRACAUDAL; PERINEURAL at 09:45

## 2023-11-01 ASSESSMENT — PAIN DESCRIPTION - DIRECTION
RADIATING_TOWARDS: CHEST
RADIATING_TOWARDS: CHEST

## 2023-11-01 ASSESSMENT — PAIN DESCRIPTION - ORIENTATION
ORIENTATION: INNER
ORIENTATION: INNER

## 2023-11-01 ASSESSMENT — PAIN DESCRIPTION - ONSET
ONSET: ON-GOING
ONSET: ON-GOING

## 2023-11-01 ASSESSMENT — PAIN SCALES - WONG BAKER
WONGBAKER_NUMERICALRESPONSE: 0
WONGBAKER_NUMERICALRESPONSE: 0

## 2023-11-01 ASSESSMENT — PAIN DESCRIPTION - FREQUENCY
FREQUENCY: CONTINUOUS
FREQUENCY: CONTINUOUS

## 2023-11-01 ASSESSMENT — PAIN SCALES - GENERAL
PAINLEVEL_OUTOF10: 6
PAINLEVEL_OUTOF10: 6

## 2023-11-01 ASSESSMENT — PAIN - FUNCTIONAL ASSESSMENT
PAIN_FUNCTIONAL_ASSESSMENT: 0-10
PAIN_FUNCTIONAL_ASSESSMENT: ACTIVITIES ARE NOT PREVENTED
PAIN_FUNCTIONAL_ASSESSMENT: ACTIVITIES ARE NOT PREVENTED

## 2023-11-01 ASSESSMENT — PAIN DESCRIPTION - PAIN TYPE: TYPE: SURGICAL PAIN

## 2023-11-01 ASSESSMENT — PAIN DESCRIPTION - DESCRIPTORS
DESCRIPTORS: SORE;TIGHTNESS
DESCRIPTORS: SORE;TIGHTNESS

## 2023-11-01 ASSESSMENT — PAIN DESCRIPTION - LOCATION
LOCATION: THROAT
LOCATION: THROAT

## 2023-11-01 NOTE — PROGRESS NOTES
Ambulatory Surgery/Procedure Discharge Note    Vitals:    11/01/23 1030   BP: 132/69   Pulse: 69   Resp: 16   Temp:    SpO2: 100%       No intake/output data recorded. Restroom use offered before discharge. Yes    Pain assessment:  level of pain (1-10, 10 severe)  Pain Level: 6, pt medicated per MAR  Pt to Endoscopy recovery post EGD with RFA. Pt c/o pain 6/10 at this time, pt medicated per [facility], pt states pain tolerable for discharge. Pt denies nausea at this time, pt tolerating PO fluids well. Discharge instructions given to pt's mother and she states understanding of these instructions. Pt and pt's mother state that pt is \"ready to go. \"         Patient discharged to home/self care.  Patient discharged via wheel chair by transporter to waiting family/S.O.       11/1/2023 11:32 AM

## 2023-11-01 NOTE — ANESTHESIA POSTPROCEDURE EVALUATION
Department of Anesthesiology  Postprocedure Note    Patient: Domingo Jensen  MRN: [id number]  YOB: 1970  Date of evaluation: 11/1/2023      Procedure Summary     Date: 11/01/23 Room / Location: [facility] / [facility] [address] [facility]    Anesthesia Start: 7033 Anesthesia Stop: 1000    Procedure: ESOPHAGOGASTRODUODENOSCOPY WITH RADIOFREQUENCY  ABLATION / BIOPSY Diagnosis:       Villela's esophagus without dysplasia      (Villela's esophagus without dysplasia [K22.70])    Surgeons: Todd Mendoza MD Responsible Provider: Johnathon Duke MD    Anesthesia Type: MAC ASA Status: 3          Anesthesia Type: No value filed.     Salomon Phase I: Salomon Score: 10    Salomon Phase II: Salomon Score: 10      Anesthesia Post Evaluation    Patient location during evaluation: PACU  Patient participation: complete - patient participated  Level of consciousness: awake  Airway patency: patent  Nausea & Vomiting: no nausea  Complications: no  Cardiovascular status: hemodynamically stable  Respiratory status: acceptable  Hydration status: stable  Pain management: satisfactory to patient

## 2023-11-01 NOTE — H&P
History and Physical / Pre-Sedation Assessment    Patient:  Sisi Penn   :   1970     Intended Procedure:  egd and rfa    HPI: enriquez esophagus    Past Medical History:   has a past medical history of Enriquez's esophagus, Fibromyalgia, Hx of blood clots, Kidney stone, and PONV (postoperative nausea and vomiting). Past Surgical History:   has a past surgical history that includes other surgical history (10/03/2014); Appendectomy; Abdomen surgery; Cholecystectomy; Colonoscopy; Endoscopy, colon, diagnostic; Hysterectomy; Tonsillectomy; Upper gastrointestinal endoscopy (N/A, 2018); Upper gastrointestinal endoscopy (N/A, 2019); Upper gastrointestinal endoscopy (N/A, 2019); Breast surgery (Left); Colonoscopy (2019); Colonoscopy (2019); Upper gastrointestinal endoscopy (N/A, 2019); Upper gastrointestinal endoscopy (N/A, 2019); Upper gastrointestinal endoscopy (N/A, 2021); Colonoscopy (N/A, 2021); Upper gastrointestinal endoscopy (N/A, 2023); Upper gastrointestinal endoscopy (N/A, 2023); Upper gastrointestinal endoscopy (N/A, 2023);  section; Upper gastrointestinal endoscopy (N/A, 2023); Upper gastrointestinal endoscopy (N/A, 2023); and Upper gastrointestinal endoscopy (N/A, 2023). Medications:  Prior to Admission medications    Medication Sig Start Date End Date Taking?  Authorizing Provider   Famotidine-Ca Carb-Mag Hydrox (PEPCID COMPLETE PO) Take 1 tablet by mouth at bedtime    ProviderSimba MD   cyclobenzaprine (FLEXERIL) 10 MG tablet Take 1 tablet by mouth 3 times daily as needed for Muscle spasms 22   Gem Ramirez MD   pantoprazole (PROTONIX) 40 MG tablet 1 tablet daily 19   ProviderSimba MD       Family History:  family history includes Breast Cancer in an other family member; Aaron Laity in her maternal grandfather; Diabetes type 2  in her maternal grandfather; Heart Disease in

## 2023-11-01 NOTE — OP NOTE
[facility], [address] [place] [address]                                OPERATIVE REPORT    PATIENT NAME: Miles Miller                      :        1970  MED REC NO:   [id number]                          ROOM:  ACCOUNT NO:   [de-identified]                           ADMIT DATE: 2023  PROVIDER:     Derik Delvalle MD    DATE OF PROCEDURE:  2023    SURGEON:  Derik Delvalle MD    INDICATION FOR PROCEDURE:  Villela's esophagus - followup radiofrequency  ablation. PROCEDURE:  With the patient in the left lateral position and after IV  Diprivan, the Olympus video endoscope was introduced into esophagus and  advanced towards the stomach. Excellent ablation noted from prior  sessions. Minor residual Villela's was seen and radiofrequency ablation  was performed using a TTS electrode. Hiatus hernia was identified. The  stomach was entered. It was normal.  Biopsy was obtained for  Helicobacter pylori screening. The duodenum was normal.  Scope was then  removed without complication. IMPRESSION:  1. Hiatus hernia. 2.  Residual Villela's esophagus. Radiofrequency ablation was  performed. ESTIMATED BLOOD LOSS:  None.         Derik Delvalle MD    D: 2023 10:14:57       T: 2023 11:27:13     SANTOSH/KAROLINA_RONY_MATILDE  Job#: [id number]     Doc#: [id number]    CC:

## 2024-08-16 ENCOUNTER — ANESTHESIA EVENT (OUTPATIENT)
Dept: ENDOSCOPY | Age: 54
End: 2024-08-16
Payer: COMMERCIAL

## 2024-08-16 ENCOUNTER — HOSPITAL ENCOUNTER (OUTPATIENT)
Age: 54
Setting detail: OUTPATIENT SURGERY
Discharge: HOME | End: 2024-08-16
Attending: INTERNAL MEDICINE | Admitting: INTERNAL MEDICINE
Payer: COMMERCIAL

## 2024-08-16 ENCOUNTER — ANESTHESIA (OUTPATIENT)
Dept: ENDOSCOPY | Age: 54
End: 2024-08-16
Payer: COMMERCIAL

## 2024-08-16 VITALS
WEIGHT: 260 LBS | SYSTOLIC BLOOD PRESSURE: 148 MMHG | BODY MASS INDEX: 43.32 KG/M2 | DIASTOLIC BLOOD PRESSURE: 82 MMHG | RESPIRATION RATE: 16 BRPM | HEIGHT: 65 IN | TEMPERATURE: 98 F | OXYGEN SATURATION: 94 % | HEART RATE: 65 BPM

## 2024-08-16 DIAGNOSIS — K22.70 BARRETT'S ESOPHAGUS WITHOUT DYSPLASIA: ICD-10-CM

## 2024-08-16 PROCEDURE — 7100000010 HC PHASE II RECOVERY - FIRST 15 MIN: Performed by: INTERNAL MEDICINE

## 2024-08-16 PROCEDURE — 3609017100 HC EGD: Performed by: INTERNAL MEDICINE

## 2024-08-16 PROCEDURE — 6360000002 HC RX W HCPCS: Performed by: ANESTHESIOLOGY

## 2024-08-16 PROCEDURE — 3700000000 HC ANESTHESIA ATTENDED CARE: Performed by: INTERNAL MEDICINE

## 2024-08-16 PROCEDURE — 2709999900 HC NON-CHARGEABLE SUPPLY: Performed by: INTERNAL MEDICINE

## 2024-08-16 PROCEDURE — 7100000011 HC PHASE II RECOVERY - ADDTL 15 MIN: Performed by: INTERNAL MEDICINE

## 2024-08-16 PROCEDURE — 2580000003 HC RX 258: Performed by: ANESTHESIOLOGY

## 2024-08-16 PROCEDURE — 2500000003 HC RX 250 WO HCPCS: Performed by: NURSE ANESTHETIST, CERTIFIED REGISTERED

## 2024-08-16 PROCEDURE — 88305 TISSUE EXAM BY PATHOLOGIST: CPT

## 2024-08-16 PROCEDURE — 3700000001 HC ADD 15 MINUTES (ANESTHESIA): Performed by: INTERNAL MEDICINE

## 2024-08-16 PROCEDURE — 6360000002 HC RX W HCPCS: Performed by: NURSE ANESTHETIST, CERTIFIED REGISTERED

## 2024-08-16 RX ORDER — PROMETHAZINE HYDROCHLORIDE 25 MG/ML
25 INJECTION, SOLUTION INTRAMUSCULAR; INTRAVENOUS EVERY 6 HOURS PRN
Status: DISCONTINUED | OUTPATIENT
Start: 2024-08-16 | End: 2024-08-16 | Stop reason: HOSPADM

## 2024-08-16 RX ORDER — PROPOFOL 10 MG/ML
INJECTION, EMULSION INTRAVENOUS CONTINUOUS PRN
Status: DISCONTINUED | OUTPATIENT
Start: 2024-08-16 | End: 2024-08-16 | Stop reason: SDUPTHER

## 2024-08-16 RX ORDER — LIDOCAINE HYDROCHLORIDE 20 MG/ML
INJECTION, SOLUTION EPIDURAL; INFILTRATION; INTRACAUDAL; PERINEURAL PRN
Status: DISCONTINUED | OUTPATIENT
Start: 2024-08-16 | End: 2024-08-16 | Stop reason: SDUPTHER

## 2024-08-16 RX ORDER — SODIUM CHLORIDE, SODIUM LACTATE, POTASSIUM CHLORIDE, CALCIUM CHLORIDE 600; 310; 30; 20 MG/100ML; MG/100ML; MG/100ML; MG/100ML
INJECTION, SOLUTION INTRAVENOUS CONTINUOUS
Status: DISCONTINUED | OUTPATIENT
Start: 2024-08-16 | End: 2024-08-16 | Stop reason: HOSPADM

## 2024-08-16 RX ORDER — DEXAMETHASONE 6 MG/1
6 TABLET ORAL
Status: ON HOLD | COMMUNITY
Start: 2024-02-04 | End: 2024-08-16 | Stop reason: HOSPADM

## 2024-08-16 RX ORDER — COLCHICINE 0.6 MG/1
0.6 CAPSULE ORAL DAILY
COMMUNITY
Start: 2024-08-01 | End: 2024-08-30

## 2024-08-16 RX ORDER — ALLOPURINOL 100 MG/1
1 TABLET ORAL DAILY
COMMUNITY
Start: 2024-08-01 | End: 2024-10-29

## 2024-08-16 RX ORDER — PROPOFOL 10 MG/ML
INJECTION, EMULSION INTRAVENOUS PRN
Status: DISCONTINUED | OUTPATIENT
Start: 2024-08-16 | End: 2024-08-16 | Stop reason: SDUPTHER

## 2024-08-16 RX ADMIN — PROPOFOL 150 MCG/KG/MIN: 10 INJECTION, EMULSION INTRAVENOUS at 12:19

## 2024-08-16 RX ADMIN — PROMETHAZINE HYDROCHLORIDE 25 MG: 25 INJECTION INTRAMUSCULAR; INTRAVENOUS at 10:53

## 2024-08-16 RX ADMIN — PROPOFOL 150 MG: 10 INJECTION, EMULSION INTRAVENOUS at 12:19

## 2024-08-16 RX ADMIN — SODIUM CHLORIDE, POTASSIUM CHLORIDE, SODIUM LACTATE AND CALCIUM CHLORIDE: 600; 310; 30; 20 INJECTION, SOLUTION INTRAVENOUS at 10:01

## 2024-08-16 RX ADMIN — LIDOCAINE HYDROCHLORIDE 50 MG: 20 INJECTION, SOLUTION EPIDURAL; INFILTRATION; INTRACAUDAL; PERINEURAL at 12:19

## 2024-08-16 ASSESSMENT — PAIN DESCRIPTION - DESCRIPTORS: DESCRIPTORS: PRESSURE

## 2024-08-16 ASSESSMENT — PAIN SCALES - GENERAL
PAINLEVEL_OUTOF10: 0
PAINLEVEL_OUTOF10: 0

## 2024-08-16 ASSESSMENT — PAIN - FUNCTIONAL ASSESSMENT
PAIN_FUNCTIONAL_ASSESSMENT: 0-10
PAIN_FUNCTIONAL_ASSESSMENT: ACTIVITIES ARE NOT PREVENTED

## 2024-08-16 NOTE — ANESTHESIA POSTPROCEDURE EVALUATION
Department of Anesthesiology  Postprocedure Note    Patient: Angie Henriquez  MRN: [id number]  YOB: 1970  Date of evaluation: 8/16/2024    Procedure Summary       Date: 08/16/24 Room / Location: [place] [address] / [facility]    Anesthesia Start: 1200 Anesthesia Stop: 1227    Procedure: ESOPHAGOGASTRODUODENOSCOPY BIOPSY Diagnosis:       Villela's esophagus without dysplasia      (Villela's esophagus without dysplasia [K22.70])    Surgeons: Liz Schultz MD Responsible Provider: Van White MD    Anesthesia Type: general ASA Status: 3            Anesthesia Type: No value filed.    Salomon Phase I: Salomon Score: 10    Salomon Phase II: Salomon Score: 10    Anesthesia Post Evaluation    Patient location during evaluation: PACU  Patient participation: complete - patient participated  Level of consciousness: awake and alert  Pain score: 0  Airway patency: patent  Nausea & Vomiting: no nausea and no vomiting  Cardiovascular status: hemodynamically stable  Respiratory status: acceptable  Hydration status: euvolemic  Pain management: adequate    No notable events documented.

## 2024-08-16 NOTE — ANESTHESIA PRE PROCEDURE
Department of Anesthesiology  Preprocedure Note       Name:  Angie Henriquez   Age:  54 y.o.  :  1970                                          MRN:  [id number]         Date:  2024      Surgeon: Surgeon(s):  Liz Schultz MD    Procedure: Procedure(s):  ESOPHAGOGASTRODUODENOSCOPY    Medications prior to admission:   Prior to Admission medications    Medication Sig Start Date End Date Taking? Authorizing Provider   allopurinol (ZYLOPRIM) 100 MG tablet Take 1 tablet by mouth daily 8/1/24 10/29/24 Yes ProviderSimba MD   colchicine (MITIGARE) 0.6 MG capsule Take 1 capsule by mouth daily 24 Yes ProviderSimba MD   dexAMETHasone (DECADRON) 6 MG tablet Take 1 tablet by mouth daily (with breakfast) 24  Yes ProviderSimba MD   pantoprazole (PROTONIX) 40 MG tablet 1 tablet daily 19  Yes ProviderSimba MD   Famotidine-Ca Carb-Mag Hydrox (PEPCID COMPLETE PO) Take 1 tablet by mouth at bedtime    ProviderSimba MD   cyclobenzaprine (FLEXERIL) 10 MG tablet Take 1 tablet by mouth 3 times daily as needed for Muscle spasms 22   Ryan Hamilton MD       Current medications:    Current Facility-Administered Medications   Medication Dose Route Frequency Provider Last Rate Last Admin   • lactated ringers IV soln infusion   IntraVENous Continuous Brad Dorado MD       • promethazine (PHENERGAN) injection 25 mg  25 mg IntraMUSCular Q6H PRN Van White MD           Allergies:    Allergies   Allergen Reactions   • Rocephin [Ceftriaxone] Itching and Rash       Problem List:    Patient Active Problem List   Diagnosis Code   • Sepsis (HCC) (WBC 19.8K, ) due to pyelonephritis A41.9   • Hiatal hernia with GERD and esophagitis/Villela's K44.9, K21.00   • Recurrent nephrolithiasis N20.0   • COVID-19 U07.1       Past Medical History:        Diagnosis Date   • Villela's esophagus    • Fibromyalgia    • Hx of blood clots     has 3 blood clotting factors  liden factor

## 2024-08-16 NOTE — DISCHARGE INSTRUCTIONS
ENDOSCOPY DISCHARGE INSTRUCTIONS:    Call the physician that did your procedure for any questions or concerns:            ATTAR:  [phone]               ACTIVITY:    There are potential side effects from the medications used for sedation and anesthesia during your procedure.  These include:  Dizziness or light-headedness, confusion or memory loss, delayed reaction times, loss of coordination, nausea and vomiting.  Because of your increased risk for injury, we ask that you observe the following precautions:  For the next 24 hours,  DO NOT operate an automobile, bicycle, motorcycle, , power tools or large equipment of any kind.  Do not drink alcohol, sign any legal documents or make any legal decisions for 24 hours.  Do not bend your head over lower than your heart.  DO sit on the side of bed/couch awhile before getting up.  Plan on bedrest or quiet relaxation today.  You may resume normal activities in 24 hours.    DIET:    Your first meal today should be light, avoiding spicy and fatty foods.  If you tolerate this first meal, then you may advance to your regular diet unless otherwise advised by your physician.    NORMAL SYMPTOMS:  -Mild sore throat if you’ve had an EGD   -Gaseous discomfort if you've had an EGD or Colonoscopy.     NOTIFY YOUR PHYSICIAN IF THESE SYMPTOMS OCCUR:  1. Fever (greater than 100)  5. Increased abdominal bloating  2. Severe pain    6. Excessive bleeding  3. Nausea and vomiting  7. Chest pain                                                                    4. Chills    8. Shortness of breath      ADDITIONAL INSTRUCTIONS:    Biopsy results: To be discussed at your follow-up visit.         Upper GI Endoscopy: What to Expect at Home  Your Recovery  You had an upper GI endoscopy. Your doctor used a thin, lighted tube that bends to look at the inside of your esophagus, your stomach, and the first part of the small intestine, called the duodenum.  After you have an endoscopy, you

## 2024-08-16 NOTE — PROGRESS NOTES
Ambulatory Surgery/Procedure Discharge Note    Vitals:    08/16/24 1300   BP: (!) 148/82   Pulse: 65   Resp:    Temp:    SpO2:        Pt ready for discharge per Salomon score.    No intake/output data recorded.    Restroom use offered before discharge.  Yes    Pain assessment:  level of pain (1-10, 10 severe),   Pain Level: 0    Pt and S.O./family states \"ready to go home\". Pt alert and oriented x4. IV removed. Denies N/V or pain.  Voided prior to discharge. Pt tolerating po intake. Discharge instructions given to pt and wife with pt permission. Pt and wife verbalized understanding of all instructions. Left with all belongings,  and discharge instructions.     Patient discharged to home/self care. Patient discharged via wheel chair by transporter to waiting family/S.O.       8/16/2024 1:04 PM

## 2024-08-16 NOTE — H&P
History and Physical / Pre-Sedation Assessment    Patient:  Angie Henriquez   :   1970     Intended Procedure:  egd    HPI: fu enriquez    Past Medical History:   has a past medical history of Enriquez's esophagus, Fibromyalgia, Hx of blood clots, Kidney stone, and PONV (postoperative nausea and vomiting).     Past Surgical History:   has a past surgical history that includes other surgical history (10/03/2014); Appendectomy; Abdomen surgery; Cholecystectomy; Colonoscopy; Endoscopy, colon, diagnostic; Hysterectomy; Tonsillectomy; Upper gastrointestinal endoscopy (N/A, 2018); Upper gastrointestinal endoscopy (N/A, 2019); Upper gastrointestinal endoscopy (N/A, 2019); Breast surgery (Left); Colonoscopy (2019); Colonoscopy (2019); Upper gastrointestinal endoscopy (N/A, 2019); Upper gastrointestinal endoscopy (N/A, 2019); Upper gastrointestinal endoscopy (N/A, 2021); Colonoscopy (N/A, 2021); Upper gastrointestinal endoscopy (N/A, 2023); Upper gastrointestinal endoscopy (N/A, 2023); Upper gastrointestinal endoscopy (N/A, 2023);  section; Upper gastrointestinal endoscopy (N/A, 2023); Upper gastrointestinal endoscopy (N/A, 2023); Upper gastrointestinal endoscopy (N/A, 2023); and Upper gastrointestinal endoscopy (N/A, 2023).     Medications:  Prior to Admission medications    Medication Sig Start Date End Date Taking? Authorizing Provider   allopurinol (ZYLOPRIM) 100 MG tablet Take 1 tablet by mouth daily 8/1/24 10/29/24 Yes Provider, MD Simba   colchicine (MITIGARE) 0.6 MG capsule Take 1 capsule by mouth daily 24 Yes Provider, MD Simba   Famotidine-Ca Carb-Mag Hydrox (PEPCID COMPLETE PO) Take 1 tablet by mouth at bedtime   Yes Provider, MD Simba   pantoprazole (PROTONIX) 40 MG tablet 1 tablet daily 19  Yes Provider, MD Simba   dexAMETHasone (DECADRON) 6 MG tablet Take 1 tablet by  mouth daily (with breakfast)  Patient not taking: Reported on 8/16/2024 2/4/24   Provider, MD Simba   cyclobenzaprine (FLEXERIL) 10 MG tablet Take 1 tablet by mouth 3 times daily as needed for Muscle spasms 4/4/22   Ryan Hamilton MD       Family History:  family history includes Breast Cancer in an other family member; Colon Cancer in her maternal grandfather; Diabetes type 2  in her maternal grandfather; Heart Disease in her maternal grandmother and paternal grandmother; Hypertension in her maternal grandfather; Kidney stones in her father; Other in her maternal grandmother; Ovarian Cancer in her paternal grandmother.    Social History:   reports that she has never smoked. She has never used smokeless tobacco. She reports that she does not drink alcohol and does not use drugs.    Allergies:  Rocephin [ceftriaxone]    ROS:  twelve point system review was unremarkable except for above noted history.    Nurses notes reviewed and agreed.  Medications reviewed    Physical Exam:  Vital Signs: BP (!) 153/87   Pulse 66   Temp 98.4 °F (36.9 °C) (Temporal)   Resp 18   Ht 1.651 m (5' 5\")   Wt 117.9 kg (260 lb)   LMP 08/30/2015   SpO2 100%   BMI 43.27 kg/m²    Skin: normal  HEENT: normal  Neck: supple. No adenopathy. No thyromegaly. No JVD.   Pulmonary:Normal  Cardiac:Normal  Abdomen:Normal  MS: normal  Neuro: normal  Ext: no edema. Pulses normal    Pre-Procedure Assessment / Plan:  ASA 2 - Patient with mild systemic disease with no functional limitations  Mallampati Airway Assessment:  Mallampati Class II - (soft palate, fauces & uvula are visible)  Level of Sedation Plan:Moderate sedation  Post Procedure plan: Return to same level of care    I assessed the patient and find that the patient is in satisfactory condition to proceed with the planned procedure and sedation plan.    I have explained the risk, benefits, and alternatives to the procedure. The patient understands and agrees to proceed.  Yes    Liz

## 2024-08-17 NOTE — PROCEDURES
[address] [address]                             PROCEDURE NOTE      PATIENT NAME: PAULA SPENCER                : 1970  MED REC NO: [id number]                      ROOM: Endo Pool  ACCOUNT NO: [id number]                       ADMIT DATE: 2024  PROVIDER: Liz Kingston MD      DATE OF PROCEDURE:  2024    SURGEON:  Liz Kingston MD    INDICATION FOR PROCEDURE:  Follow up Villela esophagus.  Status post RFA, to ensure complete ablation.    DESCRIPTION OF PROCEDURE:  With the patient in the left lateral position and after IV Diprivan, the Olympus video endoscope was introduced into the esophagus and advanced towards stomach.  Satisfactory ablation of Villela's was noted.  Small hiatus hernia was identified.  Stomach revealed mild gastritis and biopsies were obtained for Helicobacter pylori.  The duodenum was normal.  Scope was then removed without complication.    IMPRESSION:    1. Satisfactory ablation of Villela's.  Small hiatus hernia.  2. Mild antral gastritis.    ESTIMATED BLOOD LOSS:  None.          LIZ KINGSTON MD      D:  2024 12:37:47     T:  2024 20:07:42     SANTOSH/RAHUL  Job #:  [id number]     Doc#:  [id number]    CC:   Liz Kingston MD

## (undated) DEVICE — FORCEPS BX L240CM DIA2.4MM L NDL RAD JAW 4 133340

## (undated) DEVICE — TRAP SPEC RETRV CLR PLAS POLYP IN LN SUCT QUIK CTCH

## (undated) DEVICE — SINGLE-USE POLYPECTOMY SNARE: Brand: CAPTIVATOR

## (undated) DEVICE — FORCEPS BX L240CM WRK CHN 2.8MM STD CAP W/ NDL MIC MESH

## (undated) DEVICE — CATHETER ENDOSCP L135CM W7.5XL15.7MM DIA2.8MM FLX RFA

## (undated) DEVICE — MASK O2 ADULT POM PROCEDURAL OXYGEN MASK 7FT O2 TUBING 10FT

## (undated) DEVICE — DEVICE HEMOSTATIC L2300MM DIA2.8MM WIDER CLP OPN ROT FIX B QUICKCLIP2

## (undated) DEVICE — FORCEPS BX L240CM JAW DIA2.8MM L CAP W/ NDL MIC MESH TOOTH

## (undated) DEVICE — FORCEPS BX L240CM JAW DIA2.4MM ORNG L CAP W/ NDL DISP RAD